# Patient Record
Sex: FEMALE | Race: BLACK OR AFRICAN AMERICAN | NOT HISPANIC OR LATINO | Employment: UNEMPLOYED | URBAN - METROPOLITAN AREA
[De-identification: names, ages, dates, MRNs, and addresses within clinical notes are randomized per-mention and may not be internally consistent; named-entity substitution may affect disease eponyms.]

---

## 2022-06-25 ENCOUNTER — HOSPITAL ENCOUNTER (EMERGENCY)
Facility: HOSPITAL | Age: 32
Discharge: HOME | End: 2022-06-25
Attending: EMERGENCY MEDICINE

## 2022-06-25 VITALS
TEMPERATURE: 98.3 F | OXYGEN SATURATION: 100 % | BODY MASS INDEX: 21.34 KG/M2 | HEART RATE: 83 BPM | RESPIRATION RATE: 18 BRPM | DIASTOLIC BLOOD PRESSURE: 82 MMHG | WEIGHT: 125 LBS | HEIGHT: 64 IN | SYSTOLIC BLOOD PRESSURE: 118 MMHG

## 2022-06-25 DIAGNOSIS — Z65.8 DOMESTIC PROBLEMS: Primary | ICD-10-CM

## 2022-06-25 PROCEDURE — 99282 EMERGENCY DEPT VISIT SF MDM: CPT | Performed by: EMERGENCY MEDICINE

## 2022-06-25 PROCEDURE — 99284 EMERGENCY DEPT VISIT MOD MDM: CPT

## 2022-06-25 NOTE — ED PROVIDER NOTES
History  Chief Complaint   Patient presents with    Depression     Patient arrives via EMS with reports of increased depression trigger being not getting along with parents  Patient denies SI/HI     Patient is a 49-year-old female seen in the emergency department with concern for domestic problems over the past several days  Patient states that she has been getting into arguments with her parents at home  Patient notes some feeling of depression due to these arguments  Patient denies homicidal and suicidal ideation  Patient declines to speak with crisis team, and request to be discharged home  Patient has no other acute medical complaints in the emergency department  None       History reviewed  No pertinent past medical history  History reviewed  No pertinent surgical history  History reviewed  No pertinent family history  I have reviewed and agree with the history as documented  E-Cigarette/Vaping    E-Cigarette Use Never User      E-Cigarette/Vaping Substances     Social History     Tobacco Use    Smoking status: Never Smoker    Smokeless tobacco: Never Used   Vaping Use    Vaping Use: Never used   Substance Use Topics    Alcohol use: Yes     Comment: occassionally    Drug use: Never       Review of Systems   Constitutional: Negative for chills and fever  HENT: Negative for ear pain and sore throat  Eyes: Negative for pain and visual disturbance  Respiratory: Negative for cough and shortness of breath  Cardiovascular: Negative for chest pain and palpitations  Gastrointestinal: Negative for abdominal pain and vomiting  Genitourinary: Negative for dysuria and hematuria  Musculoskeletal: Negative for arthralgias and back pain  Skin: Negative for color change and rash  Neurological: Negative for seizures and syncope  Psychiatric/Behavioral: Negative for suicidal ideas          Domestic problems, feelings of depression   All other systems reviewed and are negative  Physical Exam  Physical Exam  Vitals and nursing note reviewed  Constitutional:       General: She is not in acute distress  Appearance: She is well-developed  HENT:      Head: Normocephalic and atraumatic  Right Ear: External ear normal       Left Ear: External ear normal       Nose: Nose normal       Mouth/Throat:      Pharynx: Oropharynx is clear  Eyes:      General: No scleral icterus  Conjunctiva/sclera: Conjunctivae normal    Cardiovascular:      Rate and Rhythm: Normal rate and regular rhythm  Heart sounds: No murmur heard  Pulmonary:      Effort: Pulmonary effort is normal  No respiratory distress  Breath sounds: Normal breath sounds  Abdominal:      General: There is no distension  Palpations: Abdomen is soft  Tenderness: There is no abdominal tenderness  Musculoskeletal:         General: No deformity or signs of injury  Cervical back: Normal range of motion and neck supple  Skin:     General: Skin is warm and dry  Neurological:      General: No focal deficit present  Mental Status: She is alert  Cranial Nerves: No cranial nerve deficit  Sensory: No sensory deficit  Psychiatric:         Mood and Affect: Mood normal          Thought Content:  Thought content normal       Comments: no homicidal or suicidal ideation         Vital Signs  ED Triage Vitals [06/25/22 0132]   Temperature Pulse Respirations Blood Pressure SpO2   98 3 °F (36 8 °C) 83 18 118/82 100 %      Temp Source Heart Rate Source Patient Position - Orthostatic VS BP Location FiO2 (%)   Oral Monitor Sitting Left arm --      Pain Score       --           Vitals:    06/25/22 0132   BP: 118/82   Pulse: 83   Patient Position - Orthostatic VS: Sitting         Visual Acuity      ED Medications  Medications - No data to display    Diagnostic Studies  Results Reviewed     Procedure Component Value Units Date/Time    POCT pregnancy, urine [[id number]]     Lab Status: No result     Rapid drug screen, urine [[id number]]     Lab Status: No result Specimen: Urine                  No orders to display              Procedures  Procedures         ED Course                                             MDM  Number of Diagnoses or Management Options  Domestic problems  Diagnosis management comments: Patient is a 57-year-old female seen in the emergency department with concern for domestic problems  Patient denies homicidal and suicidal ideation, and appears to be at no imminent risk to herself or others  Patient declined to wait to speak with crisis team in the emergency department  Patient requested to be discharged home  Plan to have patient follow up with PCP/outpatient providers  Patient stable for discharge home  Discharge instructions were reviewed with patient  Amount and/or Complexity of Data Reviewed  Tests in the medicine section of CPT®: ordered        Disposition  Final diagnoses:   Domestic problems     Time reflects when diagnosis was documented in both MDM as applicable and the Disposition within this note     Time User Action Codes Description Comment    6/25/2022  1:40 AM Idalia Lozano Add [Z65 8] Domestic problems       ED Disposition     ED Disposition   Discharge    Condition   Stable    Date/Time   Sat Jun 25, 2022  1:51 AM    Comment   Dominique Dire discharge to home/self care  Follow-up Information     Follow up With Specialties Details Why Contact Info Additional Information    Your primary doctor  Call in 1 day       [place] Call  As needed [address] [id number]  [id number]  , [place], [place], [address]          Patient's Medications    No medications on file       No discharge procedures on file      PDMP Review     None          ED Provider  Electronically Signed by           Mehul White MD  06/25/22 [address] [place] [address]

## 2022-06-25 NOTE — ED NOTES
D/c instructions reviewed, pt verbalized understanding and has no further questions at this time  Pt ambulatory off unit with steady gait       Stephen Jose RN  06/25/22 6308

## 2022-07-25 ENCOUNTER — HOSPITAL ENCOUNTER (EMERGENCY)
Facility: HOSPITAL | Age: 32
End: 2022-07-28
Attending: EMERGENCY MEDICINE

## 2022-07-25 DIAGNOSIS — D21.9 FIBROIDS: ICD-10-CM

## 2022-07-25 DIAGNOSIS — D50.0 IRON DEFICIENCY ANEMIA DUE TO CHRONIC BLOOD LOSS: ICD-10-CM

## 2022-07-25 DIAGNOSIS — F29 PSYCHOSIS (HCC): Primary | ICD-10-CM

## 2022-07-25 DIAGNOSIS — Z00.8 MEDICAL CLEARANCE FOR PSYCHIATRIC ADMISSION: ICD-10-CM

## 2022-07-25 PROBLEM — D64.9 ANEMIA: Status: ACTIVE | Noted: 2022-07-25

## 2022-07-25 PROBLEM — R45.851 SUICIDAL IDEATIONS: Status: ACTIVE | Noted: 2022-07-25

## 2022-07-25 LAB
ABO GROUP BLD: NORMAL
ABO GROUP BLD: NORMAL
AMPHETAMINES SERPL QL SCN: NEGATIVE
BARBITURATES UR QL: NEGATIVE
BASOPHILS # BLD AUTO: 0.06 THOUSANDS/ΜL (ref 0–0.1)
BASOPHILS NFR BLD AUTO: 1 % (ref 0–1)
BENZODIAZ UR QL: NEGATIVE
BLD GP AB SCN SERPL QL: NEGATIVE
COCAINE UR QL: NEGATIVE
EOSINOPHIL # BLD AUTO: 0 THOUSAND/ΜL (ref 0–0.61)
EOSINOPHIL NFR BLD AUTO: 0 % (ref 0–6)
ERYTHROCYTE [DISTWIDTH] IN BLOOD BY AUTOMATED COUNT: 23.6 % (ref 11.6–15.1)
ETHANOL SERPL-MCNC: <10 MG/DL
EXT PREG TEST URINE: NEGATIVE
EXT. CONTROL ED NAV: NORMAL
FLUAV RNA RESP QL NAA+PROBE: NEGATIVE
FLUBV RNA RESP QL NAA+PROBE: NEGATIVE
HCT VFR BLD AUTO: 26 % (ref 34.8–46.1)
HGB BLD-MCNC: 6.8 G/DL (ref 11.5–15.4)
IMM GRANULOCYTES # BLD AUTO: 0.02 THOUSAND/UL (ref 0–0.2)
IMM GRANULOCYTES NFR BLD AUTO: 0 % (ref 0–2)
LYMPHOCYTES # BLD AUTO: 1.06 THOUSANDS/ΜL (ref 0.6–4.47)
LYMPHOCYTES NFR BLD AUTO: 19 % (ref 14–44)
MCH RBC QN AUTO: 16 PG (ref 26.8–34.3)
MCHC RBC AUTO-ENTMCNC: 26.2 G/DL (ref 31.4–37.4)
MCV RBC AUTO: 61 FL (ref 82–98)
METHADONE UR QL: NEGATIVE
MONOCYTES # BLD AUTO: 0.57 THOUSAND/ΜL (ref 0.17–1.22)
MONOCYTES NFR BLD AUTO: 10 % (ref 4–12)
NEUTROPHILS # BLD AUTO: 3.88 THOUSANDS/ΜL (ref 1.85–7.62)
NEUTS SEG NFR BLD AUTO: 70 % (ref 43–75)
NRBC BLD AUTO-RTO: 0 /100 WBCS
OPIATES UR QL SCN: NEGATIVE
OXYCODONE+OXYMORPHONE UR QL SCN: NEGATIVE
PCP UR QL: NEGATIVE
PLATELET # BLD AUTO: 524 THOUSANDS/UL (ref 149–390)
PMV BLD AUTO: 10.2 FL (ref 8.9–12.7)
RBC # BLD AUTO: 4.26 MILLION/UL (ref 3.81–5.12)
RH BLD: POSITIVE
RH BLD: POSITIVE
RSV RNA RESP QL NAA+PROBE: NEGATIVE
SARS-COV-2 RNA RESP QL NAA+PROBE: NEGATIVE
SPECIMEN EXPIRATION DATE: NORMAL
THC UR QL: NEGATIVE
WBC # BLD AUTO: 5.59 THOUSAND/UL (ref 4.31–10.16)

## 2022-07-25 PROCEDURE — 82077 ASSAY SPEC XCP UR&BREATH IA: CPT | Performed by: EMERGENCY MEDICINE

## 2022-07-25 PROCEDURE — 36430 TRANSFUSION BLD/BLD COMPNT: CPT

## 2022-07-25 PROCEDURE — 86901 BLOOD TYPING SEROLOGIC RH(D): CPT | Performed by: EMERGENCY MEDICINE

## 2022-07-25 PROCEDURE — 83540 ASSAY OF IRON: CPT

## 2022-07-25 PROCEDURE — 86850 RBC ANTIBODY SCREEN: CPT | Performed by: EMERGENCY MEDICINE

## 2022-07-25 PROCEDURE — 36415 COLL VENOUS BLD VENIPUNCTURE: CPT | Performed by: EMERGENCY MEDICINE

## 2022-07-25 PROCEDURE — 0241U HB NFCT DS VIR RESP RNA 4 TRGT: CPT | Performed by: EMERGENCY MEDICINE

## 2022-07-25 PROCEDURE — 99242 OFF/OP CONSLTJ NEW/EST SF 20: CPT

## 2022-07-25 PROCEDURE — 83550 IRON BINDING TEST: CPT

## 2022-07-25 PROCEDURE — 99285 EMERGENCY DEPT VISIT HI MDM: CPT

## 2022-07-25 PROCEDURE — 82728 ASSAY OF FERRITIN: CPT

## 2022-07-25 PROCEDURE — 99285 EMERGENCY DEPT VISIT HI MDM: CPT | Performed by: EMERGENCY MEDICINE

## 2022-07-25 PROCEDURE — 86920 COMPATIBILITY TEST SPIN: CPT

## 2022-07-25 PROCEDURE — 80307 DRUG TEST PRSMV CHEM ANLYZR: CPT | Performed by: EMERGENCY MEDICINE

## 2022-07-25 PROCEDURE — 85025 COMPLETE CBC W/AUTO DIFF WBC: CPT | Performed by: EMERGENCY MEDICINE

## 2022-07-25 PROCEDURE — 86900 BLOOD TYPING SEROLOGIC ABO: CPT | Performed by: EMERGENCY MEDICINE

## 2022-07-25 PROCEDURE — 81025 URINE PREGNANCY TEST: CPT | Performed by: EMERGENCY MEDICINE

## 2022-07-25 RX ORDER — LORAZEPAM 1 MG/1
1 TABLET ORAL ONCE
Status: COMPLETED | OUTPATIENT
Start: 2022-07-25 | End: 2022-07-25

## 2022-07-25 RX ORDER — ACETAMINOPHEN 325 MG/1
650 TABLET ORAL EVERY 6 HOURS PRN
Status: DISCONTINUED | OUTPATIENT
Start: 2022-07-25 | End: 2022-07-28 | Stop reason: HOSPADM

## 2022-07-25 RX ORDER — POLYETHYLENE GLYCOL 3350 17 G/17G
17 POWDER, FOR SOLUTION ORAL DAILY PRN
Status: DISCONTINUED | OUTPATIENT
Start: 2022-07-25 | End: 2022-07-28 | Stop reason: HOSPADM

## 2022-07-25 RX ORDER — DOCUSATE SODIUM 100 MG/1
100 CAPSULE, LIQUID FILLED ORAL 2 TIMES DAILY
Status: DISCONTINUED | OUTPATIENT
Start: 2022-07-25 | End: 2022-07-28 | Stop reason: HOSPADM

## 2022-07-25 RX ORDER — FERROUS SULFATE 325(65) MG
325 TABLET ORAL
Status: DISCONTINUED | OUTPATIENT
Start: 2022-07-25 | End: 2022-07-28 | Stop reason: HOSPADM

## 2022-07-25 RX ORDER — SENNOSIDES 8.6 MG
2 TABLET ORAL
Status: DISCONTINUED | OUTPATIENT
Start: 2022-07-25 | End: 2022-07-28 | Stop reason: HOSPADM

## 2022-07-25 RX ORDER — LANOLIN ALCOHOL/MO/W.PET/CERES
3 CREAM (GRAM) TOPICAL
Status: DISCONTINUED | OUTPATIENT
Start: 2022-07-25 | End: 2022-07-28 | Stop reason: HOSPADM

## 2022-07-25 RX ADMIN — FERROUS SULFATE TAB 325 MG (65 MG ELEMENTAL FE) 325 MG: 325 (65 FE) TAB at 15:04

## 2022-07-25 RX ADMIN — Medication 3 MG: at 23:15

## 2022-07-25 RX ADMIN — SENNOSIDES 17.2 MG: 8.6 TABLET, FILM COATED ORAL at 23:15

## 2022-07-25 RX ADMIN — DOCUSATE SODIUM 100 MG: 100 CAPSULE, LIQUID FILLED ORAL at 23:15

## 2022-07-25 RX ADMIN — LORAZEPAM 1 MG: 1 TABLET ORAL at 13:24

## 2022-07-25 NOTE — ED NOTES
Pt came with only a cell phone, wearing a dress with no undergarments and flip flops        Verba Pace  07/25/22 2951

## 2022-07-25 NOTE — ED NOTES
MA Lib[facility] Bed Search    [facility]: Per Sheeba, at capacity for MA libby beds  [facility]:  No beds per Kathya Ly:  No beds per Ed  [facility]:  Per Marichuy Cruz, they don't take MA apps  [facility]:  No answer  [facility]:  No out of county [employer]:  Fax for review for [employer] bed per Leland Yuen

## 2022-07-25 NOTE — ED NOTES
Met with patient and completed the crisis assessments after consult was ordered  Patient is a 27year-old female brought in by EMS due to psychotic break and suicidal ideations  Upon review of records there was no documented mental health history other than an ED visit on 6/25/22 for a episode of depression and domestic issues which patient declined any treatment and was discharged  Upon assessment patient was groomed, alert, oriented to person, place but not month or year  Patient spoke clearly but not logically  Her eye contact and concentration was poor  Her affect was constricted and patient intermittently grimaced and stared straight ahead through out the assessment  Patient was paranoid, fearful, and delusional  Patient stated she called 911 and is seeking help  She believes her parents are evil and trying to murder her  She is unsure why  Patient stated she hears evil demons (non command) and sees evil things such as a "busted pimple"  She reported feeling hopeless due to fear of being murdered and unable to secure help  Patient endorsed episodic suicidal ideations over the past 1 month  She denied any plan though stated that she wished the PO Ativan given to her in the ED would have poisoned her  Patient reported she attempted to overdose on over the counter sleeping pills "1 or 2 times " about 1 month ago  Patient stated she did not seek any treatment  Patient denied any: prior mental health history; HI; drug or ETOH use; access to firearms; legal issues  Patient stated that she lives with her parents, but does not feel safe with them  She denied any history of abuse  Patient stated she came back from [place] [address] a couple of months ago but was unsure why she went  Patient told [facility] that she was hospitalized there for uterine fibroids  Patient denied any current somatic complaints  Patient's thinking was disordered and not reality based   Patient was intermittently uncooperative with staff when trying obtain necessary blood draw for medical clearance  Patient insight and judgement are impaired  She does not appear to be able to care for herself due to her psychosis  Lab results are pending  There is criteria for 302  Patient signed a 201 after being explained voluntary/201 vs 302 and her rights with each commitment  Patient voiced "yes" that she understood her voluntary rights  EMIL Irizarry signed [address]  Note: Two doctor [id number] will be completed if patient rescinds 201 due endorsing suicide and current psychosis affecting her ability to care for self

## 2022-07-25 NOTE — ED NOTES
Per [facility], patient is not on file  Patient does not appear to have any insurance  201 sent to Intake, though no beds are available at present  Patient will need MA jayant bed search

## 2022-07-25 NOTE — ED PROVIDER NOTES
History  Chief Complaint   Patient presents with    Hallucinations     Arrived via [facility] with reports of suicidal ideations  pt admits to feeling depressed and suicidal  Denies current plan for suicide, but has daily thoughts of suicide over the last month  Admits to prior Suicide attempt approx 1 month ago when she took a "handful" of sleeping pills  States she did not seek help at that time  No mental health history  admits to hearing voices states "they just talk" pt denies that voices tell her to harm herself or others     The patient tells me that she feels depressed for at least 1 month  She also tells me that she is having unusual thoughts, for instance she believes that her parents are out to get her and her usual   She is hearing voices that she presumed with the voices of demons  She tells me is result of this she no longer wants to live anymore  She did have a suicide attempt 1 month ago where she tried overdose on sleeping pills  She tells me she does not have any suicide plan right now but continues to feel like she does not want to live and would rather be dead  She reports hospitalization a few months ago when she was an ingrown for uterine fibroids at which time she required a blood transfusion  She tells me she is not currently on her period and does not feel any signs of anemia currently including shortness of breath, fatigue  No prior episodes of hallucination  She denies taking any psychiatric medications  Symptoms are severe and constant  None       Past Medical History:   Diagnosis Date    Fibroids 7/25/2022    Iron deficiency anemia due to chronic blood loss 7/25/2022       History reviewed  No pertinent surgical history  History reviewed  No pertinent family history  I have reviewed and agree with the history as documented      E-Cigarette/Vaping    E-Cigarette Use Never User      E-Cigarette/Vaping Substances     Social History     Tobacco Use    Smoking status: Never Smoker    Smokeless tobacco: Never Used   Vaping Use    Vaping Use: Never used   Substance Use Topics    Alcohol use: Yes     Comment: occassionally    Drug use: Never       Review of Systems   All other systems reviewed and are negative  Physical Exam  Physical Exam  Vitals and nursing note reviewed  Constitutional:       General: She is not in acute distress  Appearance: She is well-developed  HENT:      Head: Normocephalic and atraumatic  Eyes:      Comments: Pale conjunctiva   Cardiovascular:      Rate and Rhythm: Normal rate and regular rhythm  Heart sounds: No murmur heard  Pulmonary:      Effort: Pulmonary effort is normal  No respiratory distress  Breath sounds: Normal breath sounds  Abdominal:      Palpations: Abdomen is soft  Tenderness: There is no abdominal tenderness  Genitourinary:     General: Normal vulva  Musculoskeletal:      Cervical back: Neck supple  Skin:     General: Skin is warm and dry  Neurological:      General: No focal deficit present  Mental Status: She is alert and oriented to person, place, and time  Psychiatric:      Comments: Patient is appears to be responding to internal stimuli when I enter room  Poor eye contact  Poor judgment  Poor insight           Vital Signs  ED Triage Vitals   Temperature Pulse Respirations Blood Pressure SpO2   07/25/22 1258 07/25/22 1258 07/25/22 1258 07/25/22 1258 07/25/22 1258   98 4 °F (36 9 °C) 72 16 106/74 100 %      Temp Source Heart Rate Source Patient Position - Orthostatic VS BP Location FiO2 (%)   07/25/22 1258 07/25/22 1258 07/25/22 1258 07/25/22 1258 --   Oral Monitor Lying Left arm       Pain Score       07/25/22 1228       No Pain           Vitals:    07/25/22 1258   BP: 106/74   Pulse: 72   Patient Position - Orthostatic VS: Lying         Visual Acuity      ED Medications  Medications   ferrous sulfate tablet 325 mg (325 mg Oral Given 7/25/22 1504)   LORazepam (ATIVAN) tablet 1 mg (1 mg Oral Given 7/25/22 1324)       Diagnostic Studies  Results Reviewed     Procedure Component Value Units Date/Time    Rapid drug screen, urine [[id number]] Collected: 07/25/22 2044    Lab Status: In process Specimen: Urine, Clean Catch Updated: 07/25/22 2049    CBC and differential [[id number]]  (Abnormal) Collected: 07/25/22 1422    Lab Status: Final result Specimen: Blood from Arm, Right Updated: 07/25/22 1450     WBC 5 59 Thousand/uL      RBC 4 26 Million/uL      Hemoglobin 6 8 g/dL      Hematocrit 26 0 %      MCV 61 fL      MCH 16 0 pg      MCHC 26 2 g/dL      RDW 23 6 %      MPV 10 2 fL      Platelets 460 Thousands/uL      nRBC 0 /100 WBCs      Neutrophils Relative 70 %      Immat GRANS % 0 %      Lymphocytes Relative 19 %      Monocytes Relative 10 %      Eosinophils Relative 0 %      Basophils Relative 1 %      Neutrophils Absolute 3 88 Thousands/µL      Immature Grans Absolute 0 02 Thousand/uL      Lymphocytes Absolute 1 06 Thousands/µL      Monocytes Absolute 0 57 Thousand/µL      Eosinophils Absolute 0 00 Thousand/µL      Basophils Absolute 0 06 Thousands/µL     Ethanol [[id number]]  (Normal) Collected: 07/25/22 1422    Lab Status: Final result Specimen: Blood from Arm, Right Updated: 07/25/22 1443     Ethanol Lvl <10 mg/dL     FLU/RSV/COVID - if FLU/RSV clinically relevant [[id number]]  (Normal) Collected: 07/25/22 1309    Lab Status: Final result Specimen: Nares from Nose Updated: 07/25/22 1412     SARS-CoV-2 Negative     INFLUENZA A PCR Negative     INFLUENZA B PCR Negative     RSV PCR Negative    Narrative:      FOR PEDIATRIC PATIENTS - copy/paste COVID Guidelines URL to browser: [email or web address]    SARS-CoV-2 assay is a Nucleic Acid Amplification assay intended for the  qualitative detection of nucleic acid from SARS-CoV-2 in nasopharyngeal  swabs  Results are for the presumptive identification of SARS-CoV-2 RNA      Positive results are indicative of infection with SARS-CoV-2, the virus  causing COVID-19, but do not rule out bacterial infection or co-infection  with other viruses  Laboratories within the [place] and its  territories are required to report all positive results to the appropriate  public health authorities  Negative results do not preclude SARS-CoV-2  infection and should not be used as the sole basis for treatment or other  patient management decisions  Negative results must be combined with  clinical observations, patient history, and epidemiological information  This test has not been FDA cleared or approved  This test has been authorized by FDA under an Emergency Use Authorization  (EUA)  This test is only authorized for the duration of time the  declaration that circumstances exist justifying the authorization of the  emergency use of an in vitro diagnostic tests for detection of SARS-CoV-2  virus and/or diagnosis of COVID-19 infection under section 564(b)(1) of  the Act, 21 U  S C  806DLU-9(U)(0), unless the authorization is terminated  or revoked sooner  The test has been validated but independent review by FDA  and CLIA is pending  Test performed using [employer] GeneXpert: This RT-PCR assay targets N2,  a region unique to SARS-CoV-2  A conserved region in the E-gene was chosen  for pan-Sarbecovirus detection which includes SARS-CoV-2  POCT pregnancy, urine [[id number]]     Lab Status: No result Specimen: Urine                  No orders to display              Procedures  Procedures         ED Course  ED Course as of 07/25/22 2057 Mon Jul 25, 2022   1454 Anemia noted but is chronic and asymptomatic  She is not even tachycardic  Will treat with oral iron  Patient was diagnosed with uterine fibroids as the cause of her anemia  She is currently not menstruating  1455 Hemoglobin(!!): 6 8   1455 Patient is medically cleared for psychiatric evaluation and/or admission     2057 Case signed out to Dr Nitin Alberto pending psychiatric placement and urine results  MDM  Number of Diagnoses or Management Options  Diagnosis management comments: Evaluate patient  She has no symptomatic anemia due to recent hospitalization will order CBC in addition to routine behavioral health testing  Amount and/or Complexity of Data Reviewed  Clinical lab tests: ordered and reviewed        Disposition  Final diagnoses:   Psychosis ([address] )     Time reflects when diagnosis was documented in both MDM as applicable and the Disposition within this note     Time User Action Codes Description Comment    7/25/2022  2:56 PM Adrian Clark Add [F29] Psychosis [facility])       ED Disposition     ED Disposition   Transfer to Behavioral Health    [facility]   --    Date/Time   Mon Jul 25, 2022  2:58 PM    Comment   Manuela Olivier should be transferred out to [facility] facility and has been medically cleared  MD Documentation    [address] Most Recent Value   Patient Condition The patient has been stabilized such that within reasonable medical probability, no material deterioration of the patient condition or the condition of the unborn child(winston) is likely to result from the transfer   Reason for Transfer Level of Care needed not available at this facility  [inpatient psyc]   Risks of Transfer Potential for delay in receiving treatment, Potential deterioration of medical condition   Sending MD Dr [facility]   Provider Certification Consent was not obtained as patient is committed to psychiatric facility and transfer is mandated      Follow-up Information    None         Patient's Medications    No medications on file       No discharge procedures on file      PDMP Review     None          ED Provider  Electronically Signed by           Stephenie Ames MD  07/25/22 4185

## 2022-07-25 NOTE — LETTER
[facility] EMERGENCY DEPARTMENT  [address] [facility] [address]  Dept: [phone]      LJVZBB TRANSFER CONSENT    NAME Anali Pratt                                         1990                              MRN [id number]    I have been informed of my rights regarding examination, treatment, and transfer   by Dr Julien Steve MD    Benefits: Specialized equipment and/or services available at the receiving facility (Include comment)________________________ [address])    Risks: Potential for delay in receiving treatment      Transfer Request   I acknowledge that my medical condition has been evaluated and explained to me by the emergency department physician or other qualified medical person and/or my attending physician who has recommended and offered to me further medical examination and treatment  I understand the Hospital's obligation with respect to the treatment and stabilization of my emergency medical condition  I nevertheless request to be transferred  I release the Hospital, the doctor, and any other persons caring for me from all responsibility or liability for any injury or ill effects that may result from my transfer and agree to accept all responsibility for the consequences of my choice to transfer, rather than receive stabilizing treatment at the Hospital  I understand that because the transfer is my request, my insurance may not provide reimbursement for the services  The Hospital will assist and direct me and my family in how to make arrangements for transfer, but the hospital is not liable for any fees charged by the transport service  In spite of this understanding, I refuse to consent to further medical examination and treatment which has been offered to me, and request transfer to  [address] Name, [facility] : Boris RAMEY   I authorize the performance of emergency medical procedures and treatments upon me in both transit and upon arrival at the receiving facility  Additionally, I authorize the release of any and all medical records to the receiving facility and request they be transported with me, if possible  I authorize the performance of emergency medical procedures and treatments upon me in both transit and upon arrival at the receiving facility  Additionally, I authorize the release of any and all medical records to the receiving facility and request they be transported with me, if possible  I understand that the safest mode of transportation during a medical emergency is an ambulance and that the Hospital advocates the use of this mode of transport  Risks of traveling to the receiving facility by car, including absence of medical control, life sustaining equipment, such as oxygen, and medical personnel has been explained to me and I fully understand them  (SALVADOR CORRECT BOX BELOW)  [  ]  I consent to the stated transfer and to be transported by ambulance/helicopter  [  ]  I consent to the stated transfer, but refuse transportation by ambulance and accept full responsibility for my transportation by car  I understand the risks of non-ambulance transfers and I exonerate the Hospital and its staff from any deterioration in my condition that results from this refusal     X___________________________________________    DATE  22  TIME________  Signature of patient or legally responsible individual signing on patient behalf           RELATIONSHIP TO PATIENT_________________________          Provider Certification    NAME Analisa Thompson                                        [facility] 1990                              MRN [id number]    A medical screening exam was performed on the above named patient  Based on the examination:    Condition Necessitating Transfer The primary encounter diagnosis was Psychosis ([facility] [address] )   Diagnoses of Iron deficiency anemia due to chronic blood loss, Medical clearance for psychiatric admission, and Fibroids were also pertinent to this visit  Patient Condition: The patient has been stabilized such that within reasonable medical probability, no material deterioration of the patient condition or the condition of the unborn child(winston) is likely to result from the transfer    Reason for Transfer: Level of Care needed not available at this facility (Inpatient psychiatric)    Transfer Requirements: [facility]   · Space available and qualified personnel available for treatment as acknowledged by Cosmo Khoury [phone]  · Agreed to accept transfer and to provide appropriate medical treatment as acknowledged by       Dr Alfreda Severance  · Appropriate medical records of the examination and treatment of the patient are provided at the time of transfer   [address] [facility], [address] _______  · Transfer will be performed by qualified personnel from [address] [place] [address]  and appropriate transfer equipment as required, including the use of necessary and appropriate life support measures  Provider Certification: I have examined the patient and explained the following risks and benefits of being transferred/refusing transfer to the patient/family:  General risk, such as traffic hazards, adverse weather conditions, rough terrain or turbulence, possible failure of equipment (including vehicle or aircraft), or consequences of actions of persons outside the control of the transport personnel      Based on these reasonable risks and benefits to the patient and/or the unborn child(winston), and based upon the information available at the time of the patients examination, I certify that the medical benefits reasonably to be expected from the provision of appropriate medical treatments at another medical facility outweigh the increasing risks, if any, to the individuals medical condition, and in the case of labor to the unborn child, from effecting the transfer      X____________________________________________ DATE 07/27/22 TIME_______      ORIGINAL - SEND TO MEDICAL RECORDS   COPY - SEND WITH PATIENT DURING TRANSFER

## 2022-07-25 NOTE — ED NOTES
Mother and farther here to see pt at this time pt asked if she wanted to see them and she said no they did drop off something's for her at this time (food and wipes)      Jazmin Crawley RN  07/25/22 0976

## 2022-07-25 NOTE — EMTALA/ACUTE CARE TRANSFER
[facility] EMERGENCY DEPARTMENT  [address] [facility] [address]  Dept: [phone]      VZTTOO TRANSFER CONSENT    NAME Robb LOU 1990                              MRN [id number]    I have been informed of my rights regarding examination, treatment, and transfer   by Dr Merly Coronel MD    Benefits:      Risks:        Consent for Transfer:  I acknowledge that my medical condition has been evaluated and explained to me by the emergency department physician or other qualified medical person and/or my attending physician, who has recommended that I be transferred to the service of    at    The above potential benefits of such transfer, the potential risks associated with such transfer, and the probable risks of not being transferred have been explained to me, and I fully understand them  The doctor has explained that, in my case, the benefits of transfer outweigh the risks  I agree to be transferred  I authorize the performance of emergency medical procedures and treatments upon me in both transit and upon arrival at the receiving facility  Additionally, I authorize the release of any and all medical records to the receiving facility and request they be transported with me, if possible  I understand that the safest mode of transportation during a medical emergency is an ambulance and that the Hospital advocates the use of this mode of transport  Risks of traveling to the receiving facility by car, including absence of medical control, life sustaining equipment, such as oxygen, and medical personnel has been explained to me and I fully understand them  (SALVADOR CORRECT BOX BELOW)  [  ]  I consent to the stated transfer and to be transported by ambulance/helicopter  [  ]  I consent to the stated transfer, but refuse transportation by ambulance and accept full responsibility for my transportation by car    I understand the risks of non-ambulance transfers and I exonerate the Hospital and its staff from any deterioration in my condition that results from this refusal     X___________________________________________    DATE  22  TIME________  Signature of patient or legally responsible individual signing on patient behalf           RELATIONSHIP TO PATIENT_________________________          Provider Certification    NAME Ricky Navarro                                         1990                              MRN [id number]    A medical screening exam was performed on the above named patient  Based on the examination:    Condition Necessitating Transfer The encounter diagnosis was Psychosis ([facility] [address] )  Patient Condition:      Reason for Transfer:      Transfer Requirements: Facility     · Space available and qualified personnel available for treatment as acknowledged by    · Agreed to accept transfer and to provide appropriate medical treatment as acknowledged by          · Appropriate medical records of the examination and treatment of the patient are provided at the time of transfer   [address] [facility], [address] _______  · Transfer will be performed by qualified personnel from    and appropriate transfer equipment as required, including the use of necessary and appropriate life support measures      Provider Certification: I have examined the patient and explained the following risks and benefits of being transferred/refusing transfer to the patient/family:         Based on these reasonable risks and benefits to the patient and/or the unborn child(winston), and based upon the information available at the time of the patients examination, I certify that the medical benefits reasonably to be expected from the provision of appropriate medical treatments at another medical facility outweigh the increasing risks, if any, to the individuals medical condition, and in the case of labor to the unborn child, from effecting the transfer      X____________________________________________ DATE 07/25/22        TIME_______      ORIGINAL - SEND TO MEDICAL RECORDS   COPY - SEND WITH PATIENT DURING TRANSFER

## 2022-07-25 NOTE — ED NOTES
Pt is refusing to allow blood draw  States that she does not want blood drawn to prove that she is not a drunk  MD notified and in to speak with pt        Jose Eduardo Min  07/25/22 9024

## 2022-07-25 NOTE — EMTALA/ACUTE CARE TRANSFER
[facility] EMERGENCY DEPARTMENT  [address] [facility] [address]  Dept: [phone]      EMTALA TRANSFER CONSENT    NAME Jake Jhaveri                                         1990                              MRN [id number]    I have been informed of my rights regarding examination, treatment, and transfer   by Dr Eduardo Whitney MD    Benefits:      Risks: Potential for delay in receiving treatment, Potential deterioration of medical condition      Consent for Transfer:  I acknowledge that my medical condition has been evaluated and explained to me by the emergency department physician or other qualified medical person and/or my attending physician, who has recommended that I be transferred to the service of    at    The above potential benefits of such transfer, the potential risks associated with such transfer, and the probable risks of not being transferred have been explained to me, and I fully understand them  The doctor has explained that, in my case, the benefits of transfer outweigh the risks  I agree to be transferred  I authorize the performance of emergency medical procedures and treatments upon me in both transit and upon arrival at the receiving facility  Additionally, I authorize the release of any and all medical records to the receiving facility and request they be transported with me, if possible  I understand that the safest mode of transportation during a medical emergency is an ambulance and that the Hospital advocates the use of this mode of transport  Risks of traveling to the receiving facility by car, including absence of medical control, life sustaining equipment, such as oxygen, and medical personnel has been explained to me and I fully understand them  (SALVADOR CORRECT BOX BELOW)  [  ]  I consent to the stated transfer and to be transported by ambulance/helicopter    [  ]  I consent to the stated transfer, but refuse transportation by ambulance and accept full responsibility for my transportation by car  I understand the risks of non-ambulance transfers and I exonerate the Hospital and its staff from any deterioration in my condition that results from this refusal     X___________________________________________    DATE  22  TIME________  Signature of patient or legally responsible individual signing on patient behalf           RELATIONSHIP TO PATIENT_________________________          Provider Certification    NAME Clementina Schlatter                                         1990                              MRN [id number]    A medical screening exam was performed on the above named patient  Based on the examination:    Condition Necessitating Transfer The encounter diagnosis was Psychosis ([address] )  Patient Condition: The patient has been stabilized such that within reasonable medical probability, no material deterioration of the patient condition or the condition of the unborn child(winston) is likely to result from the transfer    Reason for Transfer: Level of Care needed not available at this facility (inpatient psyc)    Transfer Requirements: Facility     · Space available and qualified personnel available for treatment as acknowledged by    · Agreed to accept transfer and to provide appropriate medical treatment as acknowledged by          · Appropriate medical records of the examination and treatment of the patient are provided at the time of transfer   [address], [address] _______  · Transfer will be performed by qualified personnel from    and appropriate transfer equipment as required, including the use of necessary and appropriate life support measures      Provider Certification: I have examined the patient and explained the following risks and benefits of being transferred/refusing transfer to the patient/family:  Consent was not obtained as patient is committed to psychiatric facility and transfer is mandated      Based on these reasonable risks and benefits to the patient and/or the unborn child(winston), and based upon the information available at the time of the patients examination, I certify that the medical benefits reasonably to be expected from the provision of appropriate medical treatments at another medical facility outweigh the increasing risks, if any, to the individuals medical condition, and in the case of labor to the unborn child, from effecting the transfer      X____________________________________________ DATE 07/25/22        TIME_______      ORIGINAL - SEND TO MEDICAL RECORDS   COPY - SEND WITH PATIENT DURING TRANSFER

## 2022-07-25 NOTE — Clinical Note
Irene Brown should be transferred out to [facility] ps facility and has been medically cleared

## 2022-07-26 LAB
ABO GROUP BLD BPU: NORMAL
BPU ID: NORMAL
CROSSMATCH: NORMAL
FERRITIN SERPL-MCNC: 3 NG/ML (ref 8–388)
HCT VFR BLD AUTO: 26 % (ref 34.8–46.1)
HCT VFR BLD AUTO: 26.5 % (ref 34.8–46.1)
HGB BLD-MCNC: 7.2 G/DL (ref 11.5–15.4)
HGB BLD-MCNC: 7.3 G/DL (ref 11.5–15.4)
IRON SATN MFR SERPL: 3 % (ref 15–50)
IRON SERPL-MCNC: 16 UG/DL (ref 50–170)
TIBC SERPL-MCNC: 467 UG/DL (ref 250–450)
UNIT DISPENSE STATUS: NORMAL
UNIT PRODUCT CODE: NORMAL
UNIT PRODUCT VOLUME: 350 ML
UNIT RH: NORMAL

## 2022-07-26 PROCEDURE — P9016 RBC LEUKOCYTES REDUCED: HCPCS

## 2022-07-26 PROCEDURE — 99242 OFF/OP CONSLTJ NEW/EST SF 20: CPT | Performed by: PSYCHIATRY & NEUROLOGY

## 2022-07-26 PROCEDURE — 99213 OFFICE O/P EST LOW 20 MIN: CPT | Performed by: STUDENT IN AN ORGANIZED HEALTH CARE EDUCATION/TRAINING PROGRAM

## 2022-07-26 PROCEDURE — 85014 HEMATOCRIT: CPT | Performed by: EMERGENCY MEDICINE

## 2022-07-26 PROCEDURE — 85018 HEMOGLOBIN: CPT | Performed by: EMERGENCY MEDICINE

## 2022-07-26 PROCEDURE — 96375 TX/PRO/DX INJ NEW DRUG ADDON: CPT

## 2022-07-26 PROCEDURE — 36415 COLL VENOUS BLD VENIPUNCTURE: CPT

## 2022-07-26 PROCEDURE — 85018 HEMOGLOBIN: CPT

## 2022-07-26 PROCEDURE — 85014 HEMATOCRIT: CPT

## 2022-07-26 RX ORDER — ONDANSETRON 2 MG/ML
4 INJECTION INTRAMUSCULAR; INTRAVENOUS ONCE
Status: COMPLETED | OUTPATIENT
Start: 2022-07-26 | End: 2022-07-26

## 2022-07-26 RX ADMIN — DOCUSATE SODIUM 100 MG: 100 CAPSULE, LIQUID FILLED ORAL at 08:06

## 2022-07-26 RX ADMIN — FERROUS SULFATE TAB 325 MG (65 MG ELEMENTAL FE) 325 MG: 325 (65 FE) TAB at 08:06

## 2022-07-26 RX ADMIN — Medication 3 MG: at 21:50

## 2022-07-26 RX ADMIN — ONDANSETRON 4 MG: 2 INJECTION INTRAMUSCULAR; INTRAVENOUS at 21:47

## 2022-07-26 RX ADMIN — SENNOSIDES 17.2 MG: 8.6 TABLET, FILM COATED ORAL at 21:50

## 2022-07-26 RX ADMIN — DOCUSATE SODIUM 100 MG: 100 CAPSULE, LIQUID FILLED ORAL at 21:50

## 2022-07-26 NOTE — ASSESSMENT & PLAN NOTE
· Patient reports being diagnosed approximately 1 year ago via MRI in [place]  · Required blood transfusion during hospitalization at that time  · Has not followed up with diagnosis  · Will need to see Gyn outpatient

## 2022-07-26 NOTE — ED NOTES
Pt eating breakfast at this time; 1:1 sitter continues; pt with no further needs; will continue to monitor     Leanna Huggins RN  07/26/22 5254

## 2022-07-26 NOTE — ED NOTES
sourav called and aware of [facility] referral   Teams set up  on ipad ed Brian Samaniego 35, RN  07/26/22 3292

## 2022-07-26 NOTE — ASSESSMENT & PLAN NOTE
· Found to have hgb 6 8, microcytic   · Patient denies recent/active bleed  · Reports taking iron supplementation as a child but denies any knowledge of anemia diagnosis  · Has known fibroids  · Last menstrual period was "normal" approximately 2 weeks ago  · 1 u PRBCs ordered and given in ED  · Hemoglobin improved to 7 2, repeat hemoglobin 7 3  · Iron panel significant for iron deficiency anemia with iron level as low as 16  · Continue with p o   Ferrous sulfate  · Bowel regimen to avoid constipation  · Offered IV Venofer as this would most likely increase her iron stores quicker but patient refused

## 2022-07-26 NOTE — ASSESSMENT & PLAN NOTE
· Presented to ED with SI, hallucinations, depression  · 201 signed in ED  · Pending inpatient psych bed

## 2022-07-26 NOTE — ED NOTES
Intake verified having the 201 but no network beds currently  Medicaid application bed search efforts:     [place]: per Katherine Urbano, no beds today  [place]:  per Marygrace Boast, no beds available  [place]:  per Angella Mancia, no beds  [facility]:  per Lm Tran, no beds  [place]: per Davon Austin, no beds  [facility]: per Ana Santizo, no beds  [place]: per Yojana Martini, no beds    Does not accept Medicaid applications:  [facility]    Psychiatry consult ordered  Alerted nursing of the same; she will contact [address] [place] [address], when able

## 2022-07-26 NOTE — PROGRESS NOTES
Narcisa U  64   Progress Note - Leopoldoannamarie Lucio 1990, 27 y o  female MRN: [id number]  Unit/Bed#: [facility] 02 Encounter: [id number]  Primary Care Provider: No primary care provider on file  Date and time admitted to hospital: 7/25/2022 12:22 PM    Anemia  Assessment & Plan  · Found to have hgb 6 8, microcytic   · Patient denies recent/active bleed  · Reports taking iron supplementation as a child but denies any knowledge of anemia diagnosis  · Has known fibroids  · Last menstrual period was "normal" approximately 2 weeks ago  · 1 u PRBCs ordered and given in ED  · Hemoglobin improved to 7 2, repeat hemoglobin 7 3  · Iron panel significant for iron deficiency anemia with iron level as low as 16  · Continue with p o  Ferrous sulfate  · Bowel regimen to avoid constipation  · Offered IV Venofer as this would most likely increase her iron stores quicker but patient refused    Fibroids  Assessment & Plan  · Patient reports being diagnosed approximately 1 year ago via MRI in [place]  · Required blood transfusion during hospitalization at that time  · Has not followed up with diagnosis  · Will need to see Gyn outpatient    * Suicidal ideations  Assessment & Plan  · Presented to ED with SI, hallucinations, depression  · 201 signed in ED  · Pending inpatient psych bed          VTE Pharmacologic Prophylaxis:   Low Risk (Score 0-2) - Encourage Ambulation  Patient Centered Rounds: I performed bedside rounds with nursing staff today  Discussions with Specialists or Other Care Team Provider: n/a    Education and Discussions with Family / Patient: Patient declined call to   Time Spent for Care: 30 minutes  More than 50% of total time spent on counseling and coordination of care as described above      Current Length of Stay: 0 day(s)  Current Patient Status: Emergency   Certification Statement: The patient will continue to require additional inpatient hospital stay due to pending transfer to psych facility  Discharge Plan: Once bed is available    Code Status: No Order    Subjective:   Patient seen examined at bedside  Currently denies having any acute complaints  Denies any chest pain shortness of breath palpitations abdominal pain nausea vomiting diarrhea  She does admit to having heavy periods since the pandemic started which could be contributing to her anemia  Objective:     Vitals:   Temp (24hrs), Av 8 °F (37 1 °C), Min:98 6 °F (37 °C), Max:98 9 °F (37 2 °C)    Temp:  [98 6 °F (37 °C)-98 9 °F (37 2 °C)] 98 8 °F (37 1 °C)  HR:  [70-83] 83  Resp:  [16-17] 17  BP: ()/(62-84) 116/84  SpO2:  [98 %-100 %] 98 %  Body mass index is 21 46 kg/m²  Input and Output Summary (last 24 hours): Intake/Output Summary (Last 24 hours) at 2022 1808  Last data filed at 2022 0340  Gross per 24 hour   Intake 350 ml   Output --   Net 350 ml       Physical Exam:   Physical Exam  Vitals reviewed  HENT:      Head: Normocephalic and atraumatic  Right Ear: External ear normal       Left Ear: External ear normal       Nose: Nose normal       Mouth/Throat:      Mouth: Mucous membranes are moist       Pharynx: Oropharynx is clear  Eyes:      Extraocular Movements: Extraocular movements intact  Cardiovascular:      Rate and Rhythm: Normal rate and regular rhythm  Heart sounds: Murmur heard  Pulmonary:      Effort: Pulmonary effort is normal       Breath sounds: Normal breath sounds  Abdominal:      General: Abdomen is flat  Palpations: Abdomen is soft  Tenderness: There is no abdominal tenderness  Musculoskeletal:      Right lower leg: No edema  Left lower leg: No edema  Skin:     General: Skin is warm and dry  Neurological:      General: No focal deficit present  Mental Status: She is alert  Mental status is at baseline     Psychiatric:         Mood and Affect: Mood normal          Behavior: Behavior normal           Additional Data: Labs:  Results from last 7 days   Lab Units 07/26/22  1457 07/26/22  0641 07/25/22  1422   WBC Thousand/uL  --   --  5 59   HEMOGLOBIN g/dL 7 3*   < > 6 8*   HEMATOCRIT % 26 5*   < > 26 0*   PLATELETS Thousands/uL  --   --  524*   NEUTROS PCT %  --   --  70   LYMPHS PCT %  --   --  19   MONOS PCT %  --   --  10   EOS PCT %  --   --  0    < > = values in this interval not displayed  Lines/Drains:  Invasive Devices  Report    Peripheral Intravenous Line  Duration           Peripheral IV 07/25/22 Right Antecubital <1 day                      Imaging: No pertinent imaging reviewed  Recent Cultures (last 7 days):         Last 24 Hours Medication List:   Current Facility-Administered Medications   Medication Dose Route Frequency Provider Last Rate    acetaminophen  650 mg Oral Q6H PRN MELANY Zambrano      docusate sodium  100 mg Oral BID MELANY Zambrano      ferrous sulfate  325 mg Oral Daily With Breakfast Anders Velásquez MD      melatonin  3 mg Oral HS Shari Zimmer [place]      polyethylene glycol  17 g Oral Daily PRN MELANY Lal      senna  2 tablet Oral HS MELANY Lal          Today, Patient Was Seen By: Dionne Campoverde DO    **Please Note: This note may have been constructed using a voice recognition system  **

## 2022-07-26 NOTE — ED NOTES
Pt resting comfortably; 1:1 sitter continues     Erwin Resendez RN  07/26/22 Chris 30, RN  07/26/22 4009

## 2022-07-26 NOTE — ED CARE HANDOFF
Emergency Department Sign Out Note        Sign out and transfer of care from Dr Jearldine Oppenheim  See Separate Emergency Department note  The patient, Rachael Hunt, was evaluated by the previous provider for hallucinations  Subjective:    Has hx of anemia secondary to fibroids  Transfused 3u in the past back in [facility]  2 wks since last vaginal bleeding  Per patient her doctors had recommended removal of fibroids which patient decliend  Workup Completed:  Bloodwork  Cleared medically for psych hosptilization    ED Course / Workup Pending (followup): Objective:    Physical Exam  Constitutional:       General: She is not in acute distress  Appearance: Normal appearance  She is not ill-appearing  HENT:      Head: Normocephalic and atraumatic  Right Ear: External ear normal       Left Ear: External ear normal       Nose: Nose normal       Mouth/Throat:      Mouth: Mucous membranes are moist    Eyes:      General:         Right eye: No discharge  Left eye: No discharge  Conjunctiva/sclera: Conjunctivae normal    Cardiovascular:      Rate and Rhythm: Normal rate and regular rhythm  Pulses: Normal pulses  Heart sounds: No murmur heard  Pulmonary:      Effort: Pulmonary effort is normal       Breath sounds: Normal breath sounds  Abdominal:      General: Abdomen is flat  There is no distension  Tenderness: There is no abdominal tenderness  Musculoskeletal:         General: Normal range of motion  Cervical back: Normal range of motion  Skin:     General: Skin is warm  Capillary Refill: Capillary refill takes less than 2 seconds  Findings: No rash  Neurological:      General: No focal deficit present  Mental Status: She is alert  Mental status is at baseline  Psychiatric:         Mood and Affect: Mood normal          Behavior: Behavior normal          AP  Will consult Medicine for microcytic anemia  Will transfuse 1 unit hemoglobin    Patient remains medically cleared for psychiatric hospitalization  ED Course as of 07/25/22 2221 Mon Jul 25, 2022 2052 Here's voices  Believes they are demons  In the [facility] had fibroids  Asked to get surgery but declined  Not being transfused  Asymptomatic    2206 Spoke with medicine  Not a candidate for admission  Recommended transfusing 1 unit  They will consult  Remains medically cleared for psychiatric admission  Spoke with patient regarding risks and benefits of blood transfusion  Patient consents to transfusion  Procedures  MDM  Number of Diagnoses or Management Options          Disposition  Final diagnoses:   Psychosis ([address] )     Time reflects when diagnosis was documented in both MDM as applicable and the Disposition within this note     Time User Action Codes Description Comment    7/25/2022  2:56 PM Chance Musty Add [F29] Psychosis [facility])       ED Disposition     ED Disposition   Transfer to Behavioral Health    [facility]   --    Date/Time   Mon Jul 25, 2022  2:58 PM    Comment   Mandy Jones should be transferred out to [facility] ps facility and has been medically cleared  MD Documentation    [address] Most Recent Value   Patient Condition The patient has been stabilized such that within reasonable medical probability, no material deterioration of the patient condition or the condition of the unborn child(winston) is likely to result from the transfer   Reason for Transfer Level of Care needed not available at this facility  [inpatient psyc]   Risks of Transfer Potential for delay in receiving treatment, Potential deterioration of medical condition   Sending MD Dr Edgar Grant   Provider Certification Consent was not obtained as patient is committed to psychiatric facility and transfer is mandated      Follow-up Information    None       Patient's Medications    No medications on file     No discharge procedures on file         ED Provider  Electronically Signed by     Trace Richardson,   07/26/22 0010

## 2022-07-26 NOTE — ED NOTES
Patient standing in doorway at this time, states she does not want to sit on stretcher, questioning why she cannot leave and why she cannot use her cell phone  Crisis worker will be in to speak with patient when available       Chichi Murrell RN  07/25/22 5875

## 2022-07-26 NOTE — ED NOTES
Pt's father called requesting information; per conversation with pt, she does not authorize any information to be released to either her mother or father; informed father of same and he verbalized understanding      Leanna Huggins RN  07/26/22 8574

## 2022-07-26 NOTE — ASSESSMENT & PLAN NOTE
· Found to have hgb 6 8, microcytic   · Patient denies recent/active bleed  · Reports taking iron supplementation as a child but denies any knowledge of anemia diagnosis  · Has known fibroids  · Last menstrual period was "normal" approximately 2 weeks ago  · 1 u PRBCs ordered and given in ED  · Check iron panel  · Did received 325 mg PO iron earlier today  · Consider full anemia panel if iron panel is negative  · Will continue daily PO iron supplement until panel results  · Start bowel regimen

## 2022-07-26 NOTE — ED NOTES
Spoke with pharmacist at [facility], medication not stocked in this hospital, will need to be transferred from [facility]        Luis Felipe Soriano RN  07/25/22 2114
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
## 2022-07-26 NOTE — CONSULTS
[address] [facility] 1990, 27 y o  female MRN: [id number]  Unit/Bed#: [facility] Encounter: [id number]  Primary Care Provider: No primary care provider on file  Date and time admitted to hospital: 7/25/2022 12:22 PM    Consult to internal medicine  Consult performed by: MELANY Jacobsen  Consult ordered by: Yazmin Goldsmith DO          Anemia  Assessment & Plan  · Found to have hgb 6 8, microcytic   · Patient denies recent/active bleed  · Reports taking iron supplementation as a child but denies any knowledge of anemia diagnosis  · Has known fibroids  · Last menstrual period was "normal" approximately 2 weeks ago  · 1 u PRBCs ordered and given in ED  · Check iron panel  · Did received 325 mg PO iron earlier today  · Consider full anemia panel if iron panel is negative  · Will continue daily PO iron supplement until panel results  · Start bowel regimen    Fibroids  Assessment & Plan  · Patient reports being diagnosed approximately 1 year ago via MRI in [place]  · Required blood transfusion during hospitalization at that time  · Has not followed up with diagnosis  · Will need to see Gyn outpatient    * Suicidal ideations  Assessment & Plan  · Presented to ED with SI, hallucinations, depression  · 201 signed in ED  · Pending inpatient psych bed      VTE Prophylaxis:   Low Risk (Score 0-2) - Encourage Ambulation  Recommendations for Discharge:  · Pending psych recommendations    Counseling / Coordination of Care Time: 30 minutes Greater than 50% of total time spent on patient counseling and coordination of care  Collaboration of Care: Were Recommendations Directly Discussed with Primary Treatment Team? No    History of Present Illness:  Trish Rodriguez is a 27 y o  female who is originally admitted to the psychiatric service due to [place] [address]  We are consulted for anemia that was noted during work-up in the ED    Patient presented to the ED with suicidal ideations and hallucinations  A 201 was signed and she is currently waiting for an inpatient psychiatric bed  During initial work-up in the ED, she was noted to have microcytic anemia  She was started on PO iron and ordered a unit of blood  [facility] was consulted for anemia work-up and treatment  Review of Systems:  Review of Systems   Constitutional: Negative for chills, diaphoresis, fatigue and fever  HENT: Negative for congestion, postnasal drip, sinus pain, sore throat and trouble swallowing  Eyes: Negative for pain, itching and visual disturbance  Respiratory: Negative for cough, chest tightness, shortness of breath and wheezing  Cardiovascular: Negative for chest pain, palpitations and leg swelling  Gastrointestinal: Negative for abdominal pain, diarrhea, nausea and vomiting  Genitourinary: Negative for dysuria, flank pain, hematuria, menstrual problem, urgency and vaginal bleeding  Musculoskeletal: Negative for arthralgias, gait problem and myalgias  Skin: Negative for rash and wound  Neurological: Negative for dizziness, syncope, weakness, light-headedness, numbness and headaches  Hematological: Does not bruise/bleed easily  Past Medical and Surgical History:   Past Medical History:   Diagnosis Date    Fibroids 7/25/2022    Iron deficiency anemia due to chronic blood loss 7/25/2022       History reviewed  No pertinent surgical history  Meds/Allergies:  all medications and allergies reviewed    Allergies: Allergies   Allergen Reactions    Penicillins Other (See Comments)     Reaction Date: 21Jul2008;    Reaction Date: 21Jul2008;       Augmentin [Amoxicillin-Pot Clavulanate] Rash       Social History:  Marital Status: Single  Substance Use History:   Social History     Substance and Sexual Activity   Alcohol Use Yes    Comment: occassionally     Social History     Tobacco Use   Smoking Status Never Smoker   Smokeless Tobacco Never Used     Social History     Substance and Sexual Activity   Drug Use Never       Family History:  History reviewed  No pertinent family history  Physical Exam:   Vitals:   Blood Pressure: 102/62 (07/25/22 2148)  Pulse: 70 (07/25/22 2148)  Temperature: 98 4 °F (36 9 °C) (07/25/22 1258)  Temp Source: Oral (07/25/22 1258)  Respirations: 16 (07/25/22 2148)  Height: 5' 4" (162 6 cm) (07/25/22 1228)  Weight - Scale: 56 7 kg (125 lb) (07/25/22 1228)  SpO2: 100 % (07/25/22 2148)    Physical Exam  Vitals reviewed  Constitutional:       General: She is not in acute distress  Appearance: She is not ill-appearing or diaphoretic  HENT:      Head: Normocephalic and atraumatic  Mouth/Throat:      Mouth: Mucous membranes are moist       Pharynx: Oropharynx is clear  Eyes:      Extraocular Movements: Extraocular movements intact  Conjunctiva/sclera: Conjunctivae normal       Pupils: Pupils are equal, round, and reactive to light  Cardiovascular:      Rate and Rhythm: Normal rate and regular rhythm  Pulses: Normal pulses  Heart sounds: Normal heart sounds  No murmur heard  Pulmonary:      Effort: Pulmonary effort is normal  No respiratory distress  Breath sounds: Normal breath sounds  No wheezing, rhonchi or rales  Abdominal:      General: Bowel sounds are normal  There is no distension  Palpations: Abdomen is soft  Tenderness: There is no abdominal tenderness  There is no guarding or rebound  Musculoskeletal:         General: No tenderness  Normal range of motion  Cervical back: Normal range of motion and neck supple  No tenderness  Right lower leg: No edema  Left lower leg: No edema  Skin:     General: Skin is warm and dry  Capillary Refill: Capillary refill takes less than 2 seconds  Coloration: Skin is not pale  Neurological:      General: No focal deficit present  Mental Status: She is alert and oriented to person, place, and time  Motor: No weakness     Psychiatric:         Attention and Perception: Attention normal          Mood and Affect: Mood normal          Speech: Speech normal          Behavior: Behavior normal  Behavior is cooperative  Additional Data:   Lab Results:    Results from last 7 days   Lab Units 07/25/22  1422   WBC [place]/uL 5 59   HEMOGLOBIN g/dL 6 8*   HEMATOCRIT % 26 0*   PLATELETS [place]/uL 524*   NEUTROS PCT % 70   LYMPHS PCT % 19   MONOS PCT % 10   EOS PCT % 0                 No results found for: HGBA1C            Imaging: No pertinent imaging reviewed  No orders to display       EKG, Pathology, and Other Studies Reviewed on Admission:   · EKG: No EKG obtained  ** Please Note: This note may have been constructed using a voice recognition system   **

## 2022-07-26 NOTE — ASSESSMENT & PLAN NOTE
· Patient reports being diagnosed approximately 1 year ago via MRI in [place]  · Required blood transfusion during hospitalization at that time  · Has not followed up with diagnosis  · Will need to see Gyn outpatient

## 2022-07-27 PROBLEM — F29 PSYCHOSIS (HCC): Status: ACTIVE | Noted: 2022-07-27

## 2022-07-27 LAB
ERYTHROCYTE [DISTWIDTH] IN BLOOD BY AUTOMATED COUNT: 26.7 % (ref 11.6–15.1)
FLUAV RNA RESP QL NAA+PROBE: NEGATIVE
FLUBV RNA RESP QL NAA+PROBE: NEGATIVE
HCT VFR BLD AUTO: 28.8 % (ref 34.8–46.1)
HGB BLD-MCNC: 7.9 G/DL (ref 11.5–15.4)
MCH RBC QN AUTO: 18 PG (ref 26.8–34.3)
MCHC RBC AUTO-ENTMCNC: 27.4 G/DL (ref 31.4–37.4)
MCV RBC AUTO: 66 FL (ref 82–98)
PLATELET # BLD AUTO: 367 THOUSANDS/UL (ref 149–390)
PMV BLD AUTO: 10.1 FL (ref 8.9–12.7)
RBC # BLD AUTO: 4.39 MILLION/UL (ref 3.81–5.12)
RSV RNA RESP QL NAA+PROBE: NEGATIVE
SARS-COV-2 RNA RESP QL NAA+PROBE: NEGATIVE
WBC # BLD AUTO: 4.38 THOUSAND/UL (ref 4.31–10.16)

## 2022-07-27 PROCEDURE — 36415 COLL VENOUS BLD VENIPUNCTURE: CPT | Performed by: STUDENT IN AN ORGANIZED HEALTH CARE EDUCATION/TRAINING PROGRAM

## 2022-07-27 PROCEDURE — 96366 THER/PROPH/DIAG IV INF ADDON: CPT

## 2022-07-27 PROCEDURE — 0241U HB NFCT DS VIR RESP RNA 4 TRGT: CPT | Performed by: EMERGENCY MEDICINE

## 2022-07-27 PROCEDURE — 96365 THER/PROPH/DIAG IV INF INIT: CPT

## 2022-07-27 PROCEDURE — 85027 COMPLETE CBC AUTOMATED: CPT | Performed by: STUDENT IN AN ORGANIZED HEALTH CARE EDUCATION/TRAINING PROGRAM

## 2022-07-27 PROCEDURE — 99225 PR SBSQ OBSERVATION CARE/DAY 25 MINUTES: CPT | Performed by: STUDENT IN AN ORGANIZED HEALTH CARE EDUCATION/TRAINING PROGRAM

## 2022-07-27 RX ORDER — HALOPERIDOL 5 MG/1
2.5 TABLET ORAL
Status: CANCELLED | OUTPATIENT
Start: 2022-07-27

## 2022-07-27 RX ORDER — BISACODYL 10 MG
10 SUPPOSITORY, RECTAL RECTAL DAILY PRN
Status: CANCELLED | OUTPATIENT
Start: 2022-07-27

## 2022-07-27 RX ORDER — HYDROXYZINE HYDROCHLORIDE 25 MG/1
50 TABLET, FILM COATED ORAL
Status: CANCELLED | OUTPATIENT
Start: 2022-07-27

## 2022-07-27 RX ORDER — ACETAMINOPHEN 325 MG/1
650 TABLET ORAL EVERY 4 HOURS PRN
Status: CANCELLED | OUTPATIENT
Start: 2022-07-27

## 2022-07-27 RX ORDER — LORAZEPAM 2 MG/ML
1 INJECTION INTRAMUSCULAR
Status: CANCELLED | OUTPATIENT
Start: 2022-07-27

## 2022-07-27 RX ORDER — AMOXICILLIN 250 MG
1 CAPSULE ORAL DAILY PRN
Status: CANCELLED | OUTPATIENT
Start: 2022-07-27

## 2022-07-27 RX ORDER — DOCUSATE SODIUM 100 MG/1
100 CAPSULE, LIQUID FILLED ORAL 2 TIMES DAILY
Status: CANCELLED | OUTPATIENT
Start: 2022-07-27

## 2022-07-27 RX ORDER — MINERAL OIL AND PETROLATUM 150; 830 MG/G; MG/G
1 OINTMENT OPHTHALMIC
Status: CANCELLED | OUTPATIENT
Start: 2022-07-27

## 2022-07-27 RX ORDER — LORAZEPAM 2 MG/ML
2 INJECTION INTRAMUSCULAR
Status: CANCELLED | OUTPATIENT
Start: 2022-07-27

## 2022-07-27 RX ORDER — HALOPERIDOL 5 MG/ML
2.5 INJECTION INTRAMUSCULAR
Status: CANCELLED | OUTPATIENT
Start: 2022-07-27

## 2022-07-27 RX ORDER — HALOPERIDOL 5 MG/1
5 TABLET ORAL
Status: CANCELLED | OUTPATIENT
Start: 2022-07-27

## 2022-07-27 RX ORDER — ACETAMINOPHEN 325 MG/1
650 TABLET ORAL EVERY 6 HOURS PRN
Status: CANCELLED | OUTPATIENT
Start: 2022-07-27

## 2022-07-27 RX ORDER — HALOPERIDOL 1 MG/1
1 TABLET ORAL EVERY 6 HOURS PRN
Status: CANCELLED | OUTPATIENT
Start: 2022-07-27

## 2022-07-27 RX ORDER — OLANZAPINE 5 MG/1
2.5 TABLET, ORALLY DISINTEGRATING ORAL
Status: CANCELLED | OUTPATIENT
Start: 2022-07-27

## 2022-07-27 RX ORDER — HALOPERIDOL 5 MG/ML
5 INJECTION INTRAMUSCULAR
Status: CANCELLED | OUTPATIENT
Start: 2022-07-27

## 2022-07-27 RX ORDER — LORAZEPAM 1 MG/1
1 TABLET ORAL
Status: CANCELLED | OUTPATIENT
Start: 2022-07-27

## 2022-07-27 RX ORDER — BENZTROPINE MESYLATE 1 MG/ML
1 INJECTION INTRAMUSCULAR; INTRAVENOUS
Status: CANCELLED | OUTPATIENT
Start: 2022-07-27

## 2022-07-27 RX ORDER — BENZTROPINE MESYLATE 1 MG/1
1 TABLET ORAL
Status: CANCELLED | OUTPATIENT
Start: 2022-07-27

## 2022-07-27 RX ORDER — FERROUS SULFATE 325(65) MG
325 TABLET ORAL
Status: CANCELLED | OUTPATIENT
Start: 2022-07-28

## 2022-07-27 RX ORDER — HYDROXYZINE HYDROCHLORIDE 25 MG/1
25 TABLET, FILM COATED ORAL
Status: CANCELLED | OUTPATIENT
Start: 2022-07-27

## 2022-07-27 RX ORDER — ACETAMINOPHEN 325 MG/1
975 TABLET ORAL EVERY 6 HOURS PRN
Status: CANCELLED | OUTPATIENT
Start: 2022-07-27

## 2022-07-27 RX ORDER — POLYETHYLENE GLYCOL 3350 17 G/17G
17 POWDER, FOR SOLUTION ORAL DAILY PRN
Status: CANCELLED | OUTPATIENT
Start: 2022-07-27

## 2022-07-27 RX ORDER — TRAZODONE HYDROCHLORIDE 50 MG/1
50 TABLET ORAL
Status: CANCELLED | OUTPATIENT
Start: 2022-07-27

## 2022-07-27 RX ORDER — SENNOSIDES 8.6 MG
2 TABLET ORAL
Status: CANCELLED | OUTPATIENT
Start: 2022-07-27

## 2022-07-27 RX ORDER — MAGNESIUM HYDROXIDE/ALUMINUM HYDROXICE/SIMETHICONE 120; 1200; 1200 MG/30ML; MG/30ML; MG/30ML
30 SUSPENSION ORAL EVERY 4 HOURS PRN
Status: CANCELLED | OUTPATIENT
Start: 2022-07-27

## 2022-07-27 RX ORDER — LORAZEPAM 2 MG/ML
1 INJECTION INTRAMUSCULAR EVERY 4 HOURS PRN
Status: CANCELLED | OUTPATIENT
Start: 2022-07-27

## 2022-07-27 RX ORDER — LANOLIN ALCOHOL/MO/W.PET/CERES
3 CREAM (GRAM) TOPICAL
Status: CANCELLED | OUTPATIENT
Start: 2022-07-27

## 2022-07-27 RX ORDER — BENZTROPINE MESYLATE 1 MG/ML
0.5 INJECTION INTRAMUSCULAR; INTRAVENOUS
Status: CANCELLED | OUTPATIENT
Start: 2022-07-27

## 2022-07-27 RX ADMIN — DOCUSATE SODIUM 100 MG: 100 CAPSULE, LIQUID FILLED ORAL at 10:55

## 2022-07-27 RX ADMIN — SENNOSIDES 17.2 MG: 8.6 TABLET, FILM COATED ORAL at 22:36

## 2022-07-27 RX ADMIN — POLYETHYLENE GLYCOL 3350 17 G: 17 POWDER, FOR SOLUTION ORAL at 10:55

## 2022-07-27 RX ADMIN — FERROUS SULFATE TAB 325 MG (65 MG ELEMENTAL FE) 325 MG: 325 (65 FE) TAB at 10:55

## 2022-07-27 RX ADMIN — DOCUSATE SODIUM 100 MG: 100 CAPSULE, LIQUID FILLED ORAL at 20:40

## 2022-07-27 RX ADMIN — IRON SUCROSE 300 MG: 20 INJECTION, SOLUTION INTRAVENOUS at 12:16

## 2022-07-27 RX ADMIN — Medication 3 MG: at 22:36

## 2022-07-27 NOTE — ASSESSMENT & PLAN NOTE
· Patient reports being diagnosed approximately 1 year ago via MRI in [place]  · Required blood transfusion during hospitalization at that time  · Has not followed up with diagnosis  · Will need to see Gyn outpatient

## 2022-07-27 NOTE — ED NOTES
[facility] contacted to set up transport after midnight  Tamia Garcia stated will probably be in the morning due to not having trucks overnight

## 2022-07-27 NOTE — ED CARE HANDOFF
Emergency Department Sign Out Note        See Separate Emergency Department note  The patient, Nik Greene, was evaluated for hallucinations  Labs Reviewed   CBC AND DIFFERENTIAL - Abnormal       Result Value Ref Range Status    WBC 5 59  4 31 - 10 16 Thousand/uL Final    RBC 4 26  3 81 - 5 12 Million/uL Final    Hemoglobin 6 8 (*) 11 5 - 15 4 g/dL Final    Comment: This result has been called to Sam Renae RN by Jennifer Garcia on 07/25/2022 14:50:09, and has been read back  Hematocrit 26 0 (*) 34 8 - 46 1 % Final    MCV 61 (*) 82 - 98 fL Final    MCH 16 0 (*) 26 8 - 34 3 pg Final    MCHC 26 2 (*) 31 4 - 37 4 g/dL Final    RDW 23 6 (*) 11 6 - 15 1 % Final    MPV 10 2  8 9 - 12 7 fL Final    Platelets 778 (*) 330 - 390 Thousands/uL Final    nRBC 0  /100 WBCs Final    Neutrophils Relative 70  43 - 75 % Final    Immat GRANS % 0  0 - 2 % Final    Lymphocytes Relative 19  14 - 44 % Final    Monocytes Relative 10  4 - 12 % Final    Eosinophils Relative 0  0 - 6 % Final    Basophils Relative 1  0 - 1 % Final    Neutrophils Absolute 3 88  1 85 - 7 62 Thousands/µL Final    Immature Grans Absolute 0 02  0 00 - 0 20 Thousand/uL Final    Lymphocytes Absolute 1 06  0 60 - 4 47 Thousands/µL Final    Monocytes Absolute 0 57  0 17 - 1 22 Thousand/µL Final    Eosinophils Absolute 0 00  0 00 - 0 61 Thousand/µL Final    Basophils Absolute 0 06  0 00 - 0 10 Thousands/µL Final   IRON SATURATION - Abnormal    Iron Saturation 3 (*) 15 - 50 % Final    TIBC 467 (*) 250 - 450 ug/dL Final    Iron 16 (*) 50 - 170 ug/dL Final    Comment: Patients treated with metal-binding drugs (ie  Deferoxamine) may have depressed iron values     FERRITIN - Abnormal    Ferritin 3 (*) 8 - 388 ng/mL Final   HEMOGLOBIN AND HEMATOCRIT, BLOOD - Abnormal    Hemoglobin 7 2 (*) 11 5 - 15 4 g/dL Final    Hematocrit 26 0 (*) 34 8 - 46 1 % Final   HEMOGLOBIN AND HEMATOCRIT, BLOOD - Abnormal    Hemoglobin 7 3 (*) 11 5 - 15 4 g/dL Final    Hematocrit 26 5 (*) 34 8 - 46 1 % Final   COVID19, INFLUENZA A/B, RSV PCR, [facility] - Normal    SARS-CoV-2 Negative  Negative Final    INFLUENZA A PCR Negative  Negative Final    INFLUENZA B PCR Negative  Negative Final    RSV PCR Negative  Negative Final    Narrative:     FOR PEDIATRIC PATIENTS - copy/paste COVID Guidelines URL to browser: [email or web address]    SARS-CoV-2 assay is a Nucleic Acid Amplification assay intended for the  qualitative detection of nucleic acid from SARS-CoV-2 in nasopharyngeal  swabs  Results are for the presumptive identification of SARS-CoV-2 RNA  Positive results are indicative of infection with SARS-CoV-2, the virus  causing COVID-19, but do not rule out bacterial infection or co-infection  with other viruses  Laboratories within the [place] and its  territories are required to report all positive results to the appropriate  public health authorities  Negative results do not preclude SARS-CoV-2  infection and should not be used as the sole basis for treatment or other  patient management decisions  Negative results must be combined with  clinical observations, patient history, and epidemiological information  This test has not been FDA cleared or approved  This test has been authorized by FDA under an Emergency Use Authorization  (EUA)  This test is only authorized for the duration of time the  declaration that circumstances exist justifying the authorization of the  emergency use of an in vitro diagnostic tests for detection of SARS-CoV-2  virus and/or diagnosis of COVID-19 infection under section 564(b)(1) of  the Act, 21 U  S C  498SBO-5(L)(4), unless the authorization is terminated  or revoked sooner  The test has been validated but independent review by FDA  and CLIA is pending  Test performed using [employer] Gene[employer]pert: This RT-PCR assay targets N2,  a region unique to SARS-CoV-2   A conserved region in the E-gene was chosen  for pan-Sarbecovirus detection which includes SARS-CoV-2  RAPID DRUG SCREEN, URINE - Normal    Amph/Meth UR Negative  Negative Final    Barbiturate Ur Negative  Negative Final    Benzodiazepine Urine Negative  Negative Final    Cocaine Urine Negative  Negative Final    Methadone Urine Negative  Negative Final    Opiate Urine Negative  Negative Final    PCP Ur Negative  Negative Final    THC Urine Negative  Negative Final    Oxycodone Urine Negative  Negative Final    Narrative:     FOR MEDICAL PURPOSES ONLY  IF CONFIRMATION NEEDED PLEASE CONTACT THE LAB WITHIN 5 DAYS  Drug Screen Cutoff Levels:  AMPHETAMINE/METHAMPHETAMINES  1000 ng/mL  BARBITURATES     200 ng/mL  BENZODIAZEPINES     200 ng/mL  COCAINE      300 ng/mL  METHADONE      300 ng/mL  OPIATES      300 ng/mL  PHENCYCLIDINE     25 ng/mL  THC       50 ng/mL  OXYCODONE      100 ng/mL   MEDICAL ALCOHOL - Normal    Ethanol Lvl <10  <10 mg/dL Final   POCT PREGNANCY, URINE - Normal    EXT PREG TEST UR (Ref: Negative) Negative   Final    Control Valid   Final   TYPE AND SCREEN    ABO Grouping AB   Final    Rh Factor Positive   Final    Antibody Screen Negative   Final    Specimen Expiration Date [id number]   Final   PREPARE LEUKOREDUCED RBC    Unit Product Code [id number]       Unit Number [id number]       Unit ABO AB       Unit RH POS       Crossmatch Compatible   Final    Unit Dispense Status Presumed Trans       Unit Product Volume 350  ml    ABORH RECHECK    ABO Grouping AB   Final    Rh Factor Positive   Final   IRON PANEL (INCLUDES FERRITIN,IRON SAT%, IRON, AND TIBC)    Narrative: The following orders were created for panel order Iron Panel (Includes Ferritin, Iron Sat%, Iron, and TIBC)    Procedure                               Abnormality         Status                     ---------                               -----------         ------                     Iron Saturation %[[id number]]            Abnormal            Final result Ferritin[[id number]]                     Abnormal            Final result                 Please view results for these tests on the individual orders  Patient is medically cleared, on bed search for psychiatric admission  No acute events during shift  Patient is to be signed out to my colleague at change of shift, on bed search for psychiatric admission  Procedures  MDM        Disposition  Final diagnoses:   [facility]   Iron deficiency anemia due to chronic blood loss     Time reflects when diagnosis was documented in both MDM as applicable and the Disposition within this note     Time User Action Codes Description Comment    7/25/2022  2:56 PM Suki Lopez Add [F29] Psychosis ([facility] )     7/25/2022  9:54 PM Sridhar Sanchez Add [D50 0] Iron deficiency anemia due to chronic blood loss       ED Disposition     ED Disposition   Transfer to [address] [place] [address]   --    Date/Time   Mon Jul 25, 2022  2:58 PM    Comment   Anali Pratt should be transferred out to [facility] psyc facility and has been medically cleared  MD Documentation    Jesse Lopez Most Recent Value   Patient Condition The patient has been stabilized such that within reasonable medical probability, no material deterioration of the patient condition or the condition of the unborn child(winston) is likely to result from the transfer   Reason for Transfer Level of Care needed not available at this facility  [inpatient psyc]   Risks of Transfer Potential for delay in receiving treatment, Potential deterioration of medical condition   Sending MD Dr Jojo Samuel   Provider Certification Consent was not obtained as patient is committed to psychiatric facility and transfer is mandated      Follow-up Information    None       Patient's Medications    No medications on file     No discharge procedures on file         ED Provider  Electronically Signed by     Julien Steve MD  07/26/22 5040 Jovanny Arzate MD  07/27/22 7667

## 2022-07-27 NOTE — ED NOTES
Patient with episode of vomiting a small amount x 1; denies feeling significantly nauseated on an ongoing basis  Declines offer of antiemetic medication at this time  Will continue to monitor patient; let her know that if she feels better in 15-20 mins she can take her nighttime medications as ordered       David Lerma RN  07/26/22 2467

## 2022-07-27 NOTE — ED NOTES
Patient is accepted at [facility] pending negative covid  Patient is accepted by Teena Walsh per [address] is arranged with [facility]  Transportation is scheduled for 0800 7/28/22  Patient may go to the floor after midnight    Nurse report is to be called to [phone] prior to patient transfer

## 2022-07-27 NOTE — ED NOTES
Pt wanting to speak with provider  She is upset that she has been here so long already and "why am I still here and why do they keep wanting to do bloodwork"  [facility] provider notified and will be here to speak with patient    Crisis also will speak with patient as pt is requesting to see the paperwork she signed     Petra Schirmer, RN  07/27/22 5550

## 2022-07-27 NOTE — ED NOTES
Crisis at bedside    Pt offered use of phone but declines at this time     Mateo Corley RN  07/27/22 4143

## 2022-07-27 NOTE — ED NOTES
Crisis spoke to Dr Lise Valderrama who is currently treating pt's medical issues  He was able to convince Pt to complete bloodwork  He stated that if pt's hemoglobin is above 7, he will discontinue CBC's and just provide Pt with oral iron  He stated that Pt can be treated with in ER for now and once blood work result are back, crisis will contact him for update and next steps  Crisis also spoke to Pt to update her on bed search  She stated that she didn't know she signed her rights away when signing the 201 because she has emails and work she needs to complete with deadlines and she is not able to use her phone to complete it  Crisis asked if pt could contact her place of employment via the ED phone and let them know that she is ill and could they allow her an extension until she is better  Pt was agreeable to that  Crisis informed tech supervising Pt to provide her with ED phone once bloodwork has been taken

## 2022-07-27 NOTE — PROGRESS NOTES
[facility]  Progress Note - Lynnea Canavan 1990, 27 y o  female MRN: [id number]  Unit/Bed#: [facility] Encounter: [id number]  Primary Care Provider: No primary care provider on file  Date and time admitted to hospital: 7/25/2022 12:22 PM    Anemia  Assessment & Plan  · Found to have hgb 6 8, microcytic   · Patient denies recent/active bleed  · Reports taking iron supplementation as a child but denies any knowledge of anemia diagnosis  · Has known fibroids  · Last menstrual period was "normal" approximately 2 weeks ago  · 1 u PRBCs ordered and given in ED  · 7/26 Hemoglobin improved to 7 2, repeat hemoglobin 7 3  · Iron panel significant for iron deficiency anemia with iron level as low as 16  · Continue with p o  Ferrous sulfate  · Bowel regimen to avoid constipation  · F/U repeat hemoglobin today, if less than 7 will transfuse, if remains above 7 will decrease cbc draws to weekly blood draws  · Discussed giving patient one time dose of venofer to increase her iron stores, she's now agreeable, venofer ordered  Fibroids  Assessment & Plan  · Patient reports being diagnosed approximately 1 year ago via MRI in [place]  · Required blood transfusion during hospitalization at that time  · Has not followed up with diagnosis  · Will need to see Gyn outpatient    * Suicidal ideations  Assessment & Plan  · Presented to ED with SI, hallucinations, depression  · 201 signed in ED  · Pending inpatient psych bed        VTE Pharmacologic Prophylaxis:   Low Risk (Score 0-2) - Encourage Ambulation  Patient Centered Rounds: I performed bedside rounds with nursing staff today  Discussions with Specialists or Other Care Team Provider: RN    Education and Discussions with Family / Patient: Patient declined call to   Time Spent for Care: 30 minutes  More than 50% of total time spent on counseling and coordination of care as described above      Current Length of Stay: 0 day(s)  Current Patient Status: Emergency   Certification Statement: The patient will continue to require additional inpatient hospital stay due to pending placement  Discharge Plan: once bed is available    Code Status: No Order    Subjective:   Patient seen examined at bedside  Patient currently offers no acute complaints  Denies any bleeding  Patient asking why she is getting frequent blood draws  Explained that today's blood dry it is to ensure that her hemoglobin remains above 7 and that if it is above 7 will decrease blood yobani to  Weekly cbc  Objective:     Vitals:   Temp (24hrs), Av 2 °F (36 8 °C), Min:98 2 °F (36 8 °C), Max:98 2 °F (36 8 °C)    Temp:  [98 2 °F (36 8 °C)] 98 2 °F (36 8 °C)  HR:  [66] 66  Resp:  [16] 16  BP: (119)/(83) 119/83  SpO2:  [100 %] 100 %  Body mass index is 21 46 kg/m²  Input and Output Summary (last 24 hours):   No intake or output data in the 24 hours ending 22 1004    Physical Exam:   Physical Exam  Vitals reviewed  HENT:      Head: Normocephalic and atraumatic  Right Ear: External ear normal       Left Ear: External ear normal       Nose: Nose normal       Mouth/Throat:      Mouth: Mucous membranes are moist       Pharynx: Oropharynx is clear  Eyes:      Extraocular Movements: Extraocular movements intact  Cardiovascular:      Rate and Rhythm: Normal rate and regular rhythm  Heart sounds: Murmur heard  Pulmonary:      Effort: Pulmonary effort is normal       Breath sounds: Normal breath sounds  Abdominal:      General: Abdomen is flat  Palpations: Abdomen is soft  Tenderness: There is no abdominal tenderness  Musculoskeletal:      Cervical back: Normal range of motion  Right lower leg: No edema  Left lower leg: No edema  Skin:     General: Skin is warm and dry  Neurological:      General: No focal deficit present  Mental Status: She is alert  Mental status is at baseline     Psychiatric:         Mood and Affect: Mood normal          Behavior: Behavior normal           Additional Data:     Labs:  Results from last 7 days   Lab Units 07/26/22  1457 07/26/22  0641 07/25/22  1422   WBC Thousand/uL  --   --  5 59   HEMOGLOBIN g/dL 7 3*   < > 6 8*   HEMATOCRIT % 26 5*   < > 26 0*   PLATELETS Thousands/uL  --   --  524*   NEUTROS PCT %  --   --  70   LYMPHS PCT %  --   --  19   MONOS PCT %  --   --  10   EOS PCT %  --   --  0    < > = values in this interval not displayed  Lines/Drains:  Invasive Devices  Report    Peripheral Intravenous Line  Duration           Peripheral IV 07/25/22 Right Antecubital 1 day                      Imaging: No pertinent imaging reviewed  Recent Cultures (last 7 days):         Last 24 Hours Medication List:   Current Facility-Administered Medications   Medication Dose Route Frequency Provider Last Rate    acetaminophen  650 mg Oral Q6H PRN MELANY Zambrano      docusate sodium  100 mg Oral BID MELANY Zambrano      ferrous sulfate  325 mg Oral Daily With Breakfast Cesar Ruiz MD      iron sucrose  300 mg Intravenous Once Riaz Mock DO      melatonin  3 mg Oral HS MELANY Zambrano      polyethylene glycol  17 g Oral Daily PRN MELANY Douglas      senna  2 tablet Oral HS MELANY Douglas          Today, Patient Was Seen By: Parag Rivera DO    **Please Note: This note may have been constructed using a voice recognition system  **

## 2022-07-27 NOTE — ED NOTES
[facility] Bed Search    No [facility] beds available:  Joshua:  Per Mose Seip:  Per Jayne Nguyens: per Ed  Friends:  Per [facility]:  Previously denied due to medical concerns

## 2022-07-27 NOTE — ASSESSMENT & PLAN NOTE
· Found to have hgb 6 8, microcytic   · Patient denies recent/active bleed  · Reports taking iron supplementation as a child but denies any knowledge of anemia diagnosis  · Has known fibroids  · Last menstrual period was "normal" approximately 2 weeks ago  · 1 u PRBCs ordered and given in ED  · 7/26 Hemoglobin improved to 7 2, repeat hemoglobin 7 3  · Iron panel significant for iron deficiency anemia with iron level as low as 16  · Continue with p o  Ferrous sulfate  · Bowel regimen to avoid constipation  · F/U repeat hemoglobin today, if less than 7 will transfuse, if remains above 7 will decrease cbc draws to weekly blood draws  · Discussed giving patient one time dose of venofer to increase her iron stores, she's now agreeable, venofer ordered

## 2022-07-27 NOTE — ED CARE HANDOFF
Emergency Department Sign Out Note        Sign out and transfer of care from Dr Bello Pal  See Separate Emergency Department note  The patient, Vladislav Abdul, was evaluated by the previous providers for psychiatric evaluatiuon  Workup Completed:  Results Reviewed     Procedure Component Value Units Date/Time    CBC and Platelet [[id number]]     Lab Status: No result Specimen: Blood     Hemoglobin and hematocrit, blood [[id number]]  (Abnormal) Collected: 07/26/22 1457    Lab Status: Final result Specimen: Blood from Arm, Right Updated: 07/26/22 1503     Hemoglobin 7 3 g/dL      Hematocrit 26 5 %     Iron Saturation % [[id number]]  (Abnormal) Collected: 07/25/22 2236    Lab Status: Final result Specimen: Blood from Arm, Right Updated: 07/26/22 1202     Iron Saturation 3 %      TIBC 467 ug/dL      Iron 16 ug/dL     Ferritin [[id number]]  (Abnormal) Collected: 07/25/22 2236    Lab Status: Final result Specimen: Blood from Arm, Right Updated: 07/26/22 1202     Ferritin 3 ng/mL     Hemoglobin and hematocrit, blood [[id number]]  (Abnormal) Collected: 07/26/22 0641    Lab Status: Final result Specimen: Blood from Arm, Right Updated: 07/26/22 0645     Hemoglobin 7 2 g/dL      Hematocrit 26 0 %     POCT pregnancy, urine [[id number]]  (Normal) Resulted: 07/25/22 2112    Lab Status: Final result Specimen: Urine Updated: 07/25/22 2112     EXT PREG TEST UR (Ref: Negative) Negative     Control Valid    Rapid drug screen, urine [[id number]]  (Normal) Collected: 07/25/22 2044    Lab Status: Final result Specimen: Urine, Clean Catch Updated: 07/25/22 2111     Amph/Meth UR Negative     Barbiturate Ur Negative     Benzodiazepine Urine Negative     Cocaine Urine Negative     Methadone Urine Negative     Opiate Urine Negative     PCP Ur Negative     THC Urine Negative     Oxycodone Urine Negative    Narrative:      FOR MEDICAL PURPOSES ONLY  IF CONFIRMATION NEEDED PLEASE CONTACT THE LAB WITHIN 5 DAYS      Drug Screen Cutoff Levels:  AMPHETAMINE/METHAMPHETAMINES  1000 ng/mL  BARBITURATES     200 ng/mL  BENZODIAZEPINES     200 ng/mL  COCAINE      300 ng/mL  METHADONE      300 ng/mL  OPIATES      300 ng/mL  PHENCYCLIDINE     25 ng/mL  THC       50 ng/mL  OXYCODONE      100 ng/mL    CBC and differential [[id number]]  (Abnormal) Collected: 07/25/22 1422    Lab Status: Final result Specimen: Blood from Arm, Right Updated: 07/25/22 1450     WBC 5 59 Thousand/uL      RBC 4 26 Million/uL      Hemoglobin 6 8 g/dL      Hematocrit 26 0 %      MCV 61 fL      MCH 16 0 pg      MCHC 26 2 g/dL      RDW 23 6 %      MPV 10 2 fL      Platelets 579 Thousands/uL      nRBC 0 /100 WBCs      Neutrophils Relative 70 %      Immat GRANS % 0 %      Lymphocytes Relative 19 %      Monocytes Relative 10 %      Eosinophils Relative 0 %      Basophils Relative 1 %      Neutrophils Absolute 3 88 Thousands/µL      Immature Grans Absolute 0 02 Thousand/uL      Lymphocytes Absolute 1 06 Thousands/µL      Monocytes Absolute 0 57 Thousand/µL      Eosinophils Absolute 0 00 Thousand/µL      Basophils Absolute 0 06 Thousands/µL     Ethanol [[id number]]  (Normal) Collected: 07/25/22 1422    Lab Status: Final result Specimen: Blood from Arm, Right Updated: 07/25/22 1443     Ethanol Lvl <10 mg/dL     FLU/RSV/COVID - if FLU/RSV clinically relevant [[id number]]  (Normal) Collected: 07/25/22 1309    Lab Status: Final result Specimen: Nares from Nose Updated: 07/25/22 1412     SARS-CoV-2 Negative     INFLUENZA A PCR Negative     INFLUENZA B PCR Negative     RSV PCR Negative    Narrative:      FOR PEDIATRIC PATIENTS - copy/paste COVID Guidelines URL to browser: [email or web address]    SARS-CoV-2 assay is a Nucleic Acid Amplification assay intended for the  qualitative detection of nucleic acid from SARS-CoV-2 in nasopharyngeal  swabs  Results are for the presumptive identification of SARS-CoV-2 RNA      Positive results are indicative of infection with SARS-CoV-2, the virus  causing COVID-19, but do not rule out bacterial infection or co-infection  with other viruses  Laboratories within the [place] and its  territories are required to report all positive results to the appropriate  public health authorities  Negative results do not preclude SARS-CoV-2  infection and should not be used as the sole basis for treatment or other  patient management decisions  Negative results must be combined with  clinical observations, patient history, and epidemiological information  This test has not been FDA cleared or approved  This test has been authorized by FDA under an Emergency Use Authorization  (EUA)  This test is only authorized for the duration of time the  declaration that circumstances exist justifying the authorization of the  emergency use of an in vitro diagnostic tests for detection of SARS-CoV-2  virus and/or diagnosis of COVID-19 infection under section 564(b)(1) of  the Act, 21 U  S C  007LST-2(W)(5), unless the authorization is terminated  or revoked sooner  The test has been validated but independent review by FDA  and CLIA is pending  Test performed using [employer] GeneXpert: This RT-PCR assay targets N2,  a region unique to SARS-CoV-2  A conserved region in the E-gene was chosen  for pan-Sarbecovirus detection which includes SARS-CoV-2  ED Course / Workup Pending (followup):  Pending bed placement  Continue current management  So signs of bleeding  No complaints of CP, SOB, or dizziness    General: NAD   HEENT: normocephalic, atraumatic  Pulm: normal work of breathing  : deferred   MSK: no deformity   Skin: warm and dry   Neuro: awake and alert, moves all extremities with good strength, face symmetric, speech normal   Psych: calm                                    ED Course as of 07/27/22 0828   Tue Jul 26, 2022   0651 SO: anemia, hx of fibroids, s/p transfusion x1, SI withou plan, medicine consulted    Medically cleared  Pending placement  Signed 201     Procedures  MDM        Disposition  Final diagnoses:   Psychosis ([facility] )   Iron deficiency anemia due to chronic blood loss     Time reflects when diagnosis was documented in both MDM as applicable and the Disposition within this note     Time User Action Codes Description Comment    7/25/2022  2:56 PM Scarlett Co Add [F29] Psychosis ([facility] )     7/25/2022  9:54 PM Yudelka Luis Fernando Add [D50 0] Iron deficiency anemia due to chronic blood loss       ED Disposition     ED Disposition   Transfer to [address] [place] [address]   --    Date/Time   Mon Jul 25, 2022  2:58 PM    Comment   Gerardo Steinberg should be transferred out to [facility] ps facility and has been medically cleared  MD Documentation    [address] Most Recent Value   Patient Condition The patient has been stabilized such that within reasonable medical probability, no material deterioration of the patient condition or the condition of the unborn child(winston) is likely to result from the transfer   Reason for Transfer Level of Care needed not available at this facility  [inpatient psyc]   Risks of Transfer Potential for delay in receiving treatment, Potential deterioration of medical condition   Sending MD Dr Taurus Coughlin   Provider Certification Consent was not obtained as patient is committed to psychiatric facility and transfer is mandated      Follow-up Information    None       Patient's Medications    No medications on file     No discharge procedures on file         ED Provider  Electronically Signed by     Jeimy Castro DO  07/27/22 8688

## 2022-07-27 NOTE — ED NOTES
Patient decided she would like something for nausea; physician made aware       Bridgett Geronimo RN  07/26/22 7194

## 2022-07-27 NOTE — ED CARE HANDOFF
Emergency Department Sign Out Note        Signout and transfer of care from my colleague, Dr [facility]  See Separate Emergency Department note  The patient, Irene Brown, was evaluated for psychosis  Labs Reviewed   CBC AND DIFFERENTIAL - Abnormal       Result Value Ref Range Status    WBC 5 59  4 31 - 10 16 Thousand/uL Final    RBC 4 26  3 81 - 5 12 Million/uL Final    Hemoglobin 6 8 (*) 11 5 - 15 4 g/dL Final    Comment: This result has been called to Jahaira Dockery RN by Sueellen Collet on 07/25/2022 14:50:09, and has been read back  Hematocrit 26 0 (*) 34 8 - 46 1 % Final    MCV 61 (*) 82 - 98 fL Final    MCH 16 0 (*) 26 8 - 34 3 pg Final    MCHC 26 2 (*) 31 4 - 37 4 g/dL Final    RDW 23 6 (*) 11 6 - 15 1 % Final    MPV 10 2  8 9 - 12 7 fL Final    Platelets 812 (*) 198 - 390 Thousands/uL Final    nRBC 0  /100 WBCs Final    Neutrophils Relative 70  43 - 75 % Final    Immat GRANS % 0  0 - 2 % Final    Lymphocytes Relative 19  14 - 44 % Final    Monocytes Relative 10  4 - 12 % Final    Eosinophils Relative 0  0 - 6 % Final    Basophils Relative 1  0 - 1 % Final    Neutrophils Absolute 3 88  1 85 - 7 62 Thousands/µL Final    Immature Grans Absolute 0 02  0 00 - 0 20 Thousand/uL Final    Lymphocytes Absolute 1 06  0 60 - 4 47 Thousands/µL Final    Monocytes Absolute 0 57  0 17 - 1 22 Thousand/µL Final    Eosinophils Absolute 0 00  0 00 - 0 61 Thousand/µL Final    Basophils Absolute 0 06  0 00 - 0 10 Thousands/µL Final   IRON SATURATION - Abnormal    Iron Saturation 3 (*) 15 - 50 % Final    TIBC 467 (*) 250 - 450 ug/dL Final    Iron 16 (*) 50 - 170 ug/dL Final    Comment: Patients treated with metal-binding drugs (ie  Deferoxamine) may have depressed iron values     FERRITIN - Abnormal    Ferritin 3 (*) 8 - 388 ng/mL Final   HEMOGLOBIN AND HEMATOCRIT, BLOOD - Abnormal    Hemoglobin 7 2 (*) 11 5 - 15 4 g/dL Final    Hematocrit 26 0 (*) 34 8 - 46 1 % Final   HEMOGLOBIN AND HEMATOCRIT, BLOOD - Abnormal Hemoglobin 7 3 (*) 11 5 - 15 4 g/dL Final    Hematocrit 26 5 (*) 34 8 - 46 1 % Final   CBC AND PLATELET - Abnormal    WBC 4 38  4 31 - 10 16 Thousand/uL Final    RBC 4 39  3 81 - 5 12 Million/uL Final    Hemoglobin 7 9 (*) 11 5 - 15 4 g/dL Final    Hematocrit 28 8 (*) 34 8 - 46 1 % Final    MCV 66 (*) 82 - 98 fL Final    MCH 18 0 (*) 26 8 - 34 3 pg Final    MCHC 27 4 (*) 31 4 - 37 4 g/dL Final    RDW 26 7 (*) 11 6 - 15 1 % Final    Platelets 181  616 - 390 Thousands/uL Final    MPV 10 1  8 9 - 12 7 fL Final   COVID19, INFLUENZA A/B, RSV PCR, [facility] - Normal    SARS-CoV-2 Negative  Negative Final    INFLUENZA A PCR Negative  Negative Final    INFLUENZA B PCR Negative  Negative Final    RSV PCR Negative  Negative Final    Narrative:     FOR PEDIATRIC PATIENTS - copy/paste COVID Guidelines URL to browser: [email or web address]  [email or web address]    SARS-CoV-2 assay is a Nucleic Acid Amplification assay intended for the  qualitative detection of nucleic acid from SARS-CoV-2 in nasopharyngeal  swabs  Results are for the presumptive identification of SARS-CoV-2 RNA  Positive results are indicative of infection with SARS-CoV-2, the virus  causing COVID-19, but do not rule out bacterial infection or co-infection  with other viruses  Laboratories within the [place] and its  territories are required to report all positive results to the appropriate  public health authorities  Negative results do not preclude SARS-CoV-2  infection and should not be used as the sole basis for treatment or other  patient management decisions  Negative results must be combined with  clinical observations, patient history, and epidemiological information  This test has not been FDA cleared or approved  This test has been authorized by FDA under an Emergency Use Authorization  (EUA)   This test is only authorized for the duration of time the  declaration that circumstances exist justifying the authorization of the  emergency use of an in vitro diagnostic tests for detection of SARS-CoV-2  virus and/or diagnosis of COVID-19 infection under section 564(b)(1) of  the Act, 21 U  S C  977VRA-8(S)(1), unless the authorization is terminated  or revoked sooner  The test has been validated but independent review by FDA  and CLIA is pending  Test performed using [employer] GeneXpert: This RT-PCR assay targets N2,  a region unique to SARS-CoV-2  A conserved region in the E-gene was chosen  for pan-Sarbecovirus detection which includes SARS-CoV-2  COVID19, INFLUENZA A/B, RSV PCR, [facility] - Normal    SARS-CoV-2 Negative  Negative Final    INFLUENZA A PCR Negative  Negative Final    INFLUENZA B PCR Negative  Negative Final    RSV PCR Negative  Negative Final    Narrative:     FOR PEDIATRIC PATIENTS - copy/paste COVID Guidelines URL to browser: [email or web address]  [email or web address]    SARS-CoV-2 assay is a Nucleic Acid Amplification assay intended for the  qualitative detection of nucleic acid from SARS-CoV-2 in nasopharyngeal  swabs  Results are for the presumptive identification of SARS-CoV-2 RNA  Positive results are indicative of infection with SARS-CoV-2, the virus  causing COVID-19, but do not rule out bacterial infection or co-infection  with other viruses  Laboratories within the [place] and its  territories are required to report all positive results to the appropriate  public health authorities  Negative results do not preclude SARS-CoV-2  infection and should not be used as the sole basis for treatment or other  patient management decisions  Negative results must be combined with  clinical observations, patient history, and epidemiological information  This test has not been FDA cleared or approved  This test has been authorized by FDA under an Emergency Use Authorization  (EUA)   This test is only authorized for the duration of time the  declaration that circumstances exist justifying the authorization of the  emergency use of an in vitro diagnostic tests for detection of SARS-CoV-2  virus and/or diagnosis of COVID-19 infection under section 564(b)(1) of  the Act, 21 U  S C  536VKE-9(B)(5), unless the authorization is terminated  or revoked sooner  The test has been validated but independent review by FDA  and CLIA is pending  Test performed using [employer] GeneXpert: This RT-PCR assay targets N2,  a region unique to SARS-CoV-2  A conserved region in the E-gene was chosen  for pan-Sarbecovirus detection which includes SARS-CoV-2  RAPID DRUG SCREEN, URINE - Normal    Amph/Meth UR Negative  Negative Final    Barbiturate Ur Negative  Negative Final    Benzodiazepine Urine Negative  Negative Final    Cocaine Urine Negative  Negative Final    Methadone Urine Negative  Negative Final    Opiate Urine Negative  Negative Final    PCP Ur Negative  Negative Final    THC Urine Negative  Negative Final    Oxycodone Urine Negative  Negative Final    Narrative:     FOR MEDICAL PURPOSES ONLY  IF CONFIRMATION NEEDED PLEASE CONTACT THE LAB WITHIN 5 DAYS      Drug Screen Cutoff Levels:  AMPHETAMINE/METHAMPHETAMINES  1000 ng/mL  BARBITURATES     200 ng/mL  BENZODIAZEPINES     200 ng/mL  COCAINE      300 ng/mL  METHADONE      300 ng/mL  OPIATES      300 ng/mL  PHENCYCLIDINE     25 ng/mL  THC       50 ng/mL  OXYCODONE      100 ng/mL   MEDICAL ALCOHOL - Normal    Ethanol Lvl <10  <10 mg/dL Final   POCT PREGNANCY, URINE - Normal    EXT PREG TEST UR (Ref: Negative) Negative   Final    Control Valid   Final   TYPE AND SCREEN    ABO Grouping AB   Final    Rh Factor Positive   Final    Antibody Screen Negative   Final    Specimen Expiration Date [id number]   Final   PREPARE LEUKOREDUCED RBC    Unit Product Code [id number]       Unit Number [id number]       Unit ABO AB       Unit [facility] POS       Crossmatch Compatible   Final    Unit Dispense Status Presumed Trans       Unit Product Volume 350 mary PERRY RECHECK    ABO Grouping AB   Final    Rh Factor Positive   Final   IRON PANEL (INCLUDES FERRITIN,IRON SAT%, IRON, AND TIBC)    Narrative: The following orders were created for panel order Iron Panel (Includes Ferritin, Iron Sat%, Iron, and TIBC)  Procedure                               Abnormality         Status                     ---------                               -----------         ------                     Iron Saturation %[[id number]]            Abnormal            Final result               Ferritin[[id number]]                     Abnormal            Final result                 Please view results for these tests on the individual orders  Patient is medically cleared, to be transferred to [facility] for further evaluation and treatment  No acute events during shift  Patient is to be signed out to my colleague at change of shift, with plan for transfer  Procedures  MDM        Disposition  Final diagnoses:   Psychosis ([place] [address] )   Iron deficiency anemia due to chronic blood loss     Time reflects when diagnosis was documented in both MDM as applicable and the Disposition within this note     Time User Action Codes Description Comment    7/25/2022  2:56 PM Rupali Frazier Add [F29] Psychosis ([facility] )     7/25/2022  9:54 PM Noel Gerard Add [D50 0] Iron deficiency anemia due to chronic blood loss     7/27/2022  2:02 PM Desirae Daley Add [Z00 8] Medical clearance for psychiatric admission     7/27/2022  2:02 PM Desirae Daley Add [D21 9] Fibroids       ED Disposition     ED Disposition   Transfer to [address] [place] [address]   --    Date/Time   Wed Jul 27, 2022  7:32 PM    Comment   Trish Rodriguez should be transferred to [facility], and has been medically cleared             MD Documentation    Flowsheet Row Most Recent Value   Patient Condition The patient has been stabilized such that within reasonable medical probability, no material deterioration of the patient condition or the condition of the unborn child([place]) is likely to result from the transfer   Reason for Transfer Level of Care needed not available at this facility   Benefits of Transfer Specialized equipment and/or services available at the receiving facility (Include comment)________________________   Risks of Transfer Potential for delay in receiving treatment   Accepting Physician Rita Lundborg PA-C   [address] Name, Daniel RAMEY    (Name & Tel number) Debby Sotelo [phone]   Transported by (Company and Unit #) [facility]   Sending MD Dr Tad Gurrola   Provider Certification The patient is stable for psychiatric transfer because they are medically stable, and is protected from harming him/herself or others during transport      RN Documentation    [address]iel Name, Daniel RAMEY    (Name & Tel number) Debby Sotelo [phone]   Transported by (Company and Unit #) [facility]      Follow-up Information    None       Patient's Medications    No medications on file     No discharge procedures on file         ED Provider  Electronically Signed by     Aleksandr Hendrix MD  07/27/22 4370       Aleksandr Hendrix MD  07/28/22 0690

## 2022-07-27 NOTE — ED NOTES
Woke patient to obtain a m  bloodwork as ordered, patient refused bloodwork at this time and does not wish to have additional blood samples taken  Perfecto Monzon PA-C with [facility] notified; she stated to please communicate to next shift that patient will still need the bloodwork when she is willing  Additionally, bloodwork can be obtained less often if patient willing to receive IV Venofer infusions which she did not want to do yesterday       Jacobo Pacheco RN  07/27/22 0489

## 2022-07-28 ENCOUNTER — HOSPITAL ENCOUNTER (INPATIENT)
Facility: HOSPITAL | Age: 32
LOS: 8 days | Discharge: HOME | DRG: 751 | End: 2022-08-05
Attending: STUDENT IN AN ORGANIZED HEALTH CARE EDUCATION/TRAINING PROGRAM | Admitting: PSYCHIATRY & NEUROLOGY
Payer: COMMERCIAL

## 2022-07-28 VITALS
HEIGHT: 64 IN | BODY MASS INDEX: 21.34 KG/M2 | SYSTOLIC BLOOD PRESSURE: 100 MMHG | RESPIRATION RATE: 16 BRPM | DIASTOLIC BLOOD PRESSURE: 64 MMHG | HEART RATE: 61 BPM | OXYGEN SATURATION: 100 % | TEMPERATURE: 98.3 F | WEIGHT: 125 LBS

## 2022-07-28 DIAGNOSIS — Z00.8 MEDICAL CLEARANCE FOR PSYCHIATRIC ADMISSION: ICD-10-CM

## 2022-07-28 DIAGNOSIS — D50.0 IRON DEFICIENCY ANEMIA DUE TO CHRONIC BLOOD LOSS: ICD-10-CM

## 2022-07-28 DIAGNOSIS — F29 PSYCHOSIS (HCC): Primary | ICD-10-CM

## 2022-07-28 DIAGNOSIS — N28.9 DECREASED RENAL FUNCTION: ICD-10-CM

## 2022-07-28 DIAGNOSIS — D21.9 FIBROIDS: ICD-10-CM

## 2022-07-28 PROCEDURE — 93005 ELECTROCARDIOGRAM TRACING: CPT

## 2022-07-28 PROCEDURE — 99221 1ST HOSP IP/OBS SF/LOW 40: CPT | Performed by: PSYCHIATRY & NEUROLOGY

## 2022-07-28 RX ORDER — MAGNESIUM HYDROXIDE/ALUMINUM HYDROXICE/SIMETHICONE 120; 1200; 1200 MG/30ML; MG/30ML; MG/30ML
30 SUSPENSION ORAL EVERY 4 HOURS PRN
Status: DISCONTINUED | OUTPATIENT
Start: 2022-07-28 | End: 2022-08-05 | Stop reason: HOSPADM

## 2022-07-28 RX ORDER — FERROUS SULFATE 325(65) MG
325 TABLET ORAL
Status: DISCONTINUED | OUTPATIENT
Start: 2022-07-29 | End: 2022-08-05 | Stop reason: HOSPADM

## 2022-07-28 RX ORDER — TRAZODONE HYDROCHLORIDE 50 MG/1
50 TABLET ORAL
Status: DISCONTINUED | OUTPATIENT
Start: 2022-07-28 | End: 2022-08-05 | Stop reason: HOSPADM

## 2022-07-28 RX ORDER — BISACODYL 10 MG
10 SUPPOSITORY, RECTAL RECTAL DAILY PRN
Status: DISCONTINUED | OUTPATIENT
Start: 2022-07-28 | End: 2022-08-05 | Stop reason: HOSPADM

## 2022-07-28 RX ORDER — LORAZEPAM 2 MG/ML
2 INJECTION INTRAMUSCULAR
Status: DISCONTINUED | OUTPATIENT
Start: 2022-07-28 | End: 2022-08-05 | Stop reason: HOSPADM

## 2022-07-28 RX ORDER — BENZTROPINE MESYLATE 1 MG/ML
1 INJECTION INTRAMUSCULAR; INTRAVENOUS
Status: DISCONTINUED | OUTPATIENT
Start: 2022-07-28 | End: 2022-08-05 | Stop reason: HOSPADM

## 2022-07-28 RX ORDER — DOCUSATE SODIUM 100 MG/1
100 CAPSULE, LIQUID FILLED ORAL 2 TIMES DAILY
Status: DISCONTINUED | OUTPATIENT
Start: 2022-07-28 | End: 2022-08-05 | Stop reason: HOSPADM

## 2022-07-28 RX ORDER — BISACODYL 10 MG
10 SUPPOSITORY, RECTAL RECTAL DAILY PRN
Status: DISCONTINUED | OUTPATIENT
Start: 2022-07-28 | End: 2022-07-28

## 2022-07-28 RX ORDER — ACETAMINOPHEN 325 MG/1
650 TABLET ORAL EVERY 4 HOURS PRN
Status: DISCONTINUED | OUTPATIENT
Start: 2022-07-28 | End: 2022-08-05 | Stop reason: HOSPADM

## 2022-07-28 RX ORDER — HALOPERIDOL 1 MG/1
1 TABLET ORAL EVERY 6 HOURS PRN
Status: DISCONTINUED | OUTPATIENT
Start: 2022-07-28 | End: 2022-08-05 | Stop reason: HOSPADM

## 2022-07-28 RX ORDER — ACETAMINOPHEN 325 MG/1
650 TABLET ORAL EVERY 6 HOURS PRN
Status: DISCONTINUED | OUTPATIENT
Start: 2022-07-28 | End: 2022-08-05 | Stop reason: HOSPADM

## 2022-07-28 RX ORDER — POLYETHYLENE GLYCOL 3350 17 G/17G
17 POWDER, FOR SOLUTION ORAL DAILY PRN
Status: DISCONTINUED | OUTPATIENT
Start: 2022-07-28 | End: 2022-07-28

## 2022-07-28 RX ORDER — HALOPERIDOL 5 MG/ML
5 INJECTION INTRAMUSCULAR
Status: DISCONTINUED | OUTPATIENT
Start: 2022-07-28 | End: 2022-08-05 | Stop reason: HOSPADM

## 2022-07-28 RX ORDER — BENZTROPINE MESYLATE 1 MG/ML
0.5 INJECTION INTRAMUSCULAR; INTRAVENOUS
Status: DISCONTINUED | OUTPATIENT
Start: 2022-07-28 | End: 2022-08-05 | Stop reason: HOSPADM

## 2022-07-28 RX ORDER — POLYETHYLENE GLYCOL 3350 17 G/17G
17 POWDER, FOR SOLUTION ORAL DAILY PRN
Status: DISCONTINUED | OUTPATIENT
Start: 2022-07-28 | End: 2022-08-05 | Stop reason: HOSPADM

## 2022-07-28 RX ORDER — HALOPERIDOL 5 MG/ML
2.5 INJECTION INTRAMUSCULAR
Status: DISCONTINUED | OUTPATIENT
Start: 2022-07-28 | End: 2022-08-05 | Stop reason: HOSPADM

## 2022-07-28 RX ORDER — LORAZEPAM 2 MG/ML
1 INJECTION INTRAMUSCULAR EVERY 4 HOURS PRN
Status: DISCONTINUED | OUTPATIENT
Start: 2022-07-28 | End: 2022-08-05 | Stop reason: HOSPADM

## 2022-07-28 RX ORDER — ACETAMINOPHEN 325 MG/1
975 TABLET ORAL EVERY 6 HOURS PRN
Status: DISCONTINUED | OUTPATIENT
Start: 2022-07-28 | End: 2022-08-05 | Stop reason: HOSPADM

## 2022-07-28 RX ORDER — HALOPERIDOL 5 MG/1
5 TABLET ORAL
Status: DISCONTINUED | OUTPATIENT
Start: 2022-07-28 | End: 2022-08-05 | Stop reason: HOSPADM

## 2022-07-28 RX ORDER — LORAZEPAM 2 MG/ML
1 INJECTION INTRAMUSCULAR
Status: DISCONTINUED | OUTPATIENT
Start: 2022-07-28 | End: 2022-08-05 | Stop reason: HOSPADM

## 2022-07-28 RX ORDER — AMOXICILLIN 250 MG
1 CAPSULE ORAL DAILY PRN
Status: DISCONTINUED | OUTPATIENT
Start: 2022-07-28 | End: 2022-08-05 | Stop reason: HOSPADM

## 2022-07-28 RX ORDER — HYDROXYZINE 50 MG/1
50 TABLET, FILM COATED ORAL
Status: DISCONTINUED | OUTPATIENT
Start: 2022-07-28 | End: 2022-08-05 | Stop reason: HOSPADM

## 2022-07-28 RX ORDER — BENZTROPINE MESYLATE 1 MG/1
1 TABLET ORAL
Status: DISCONTINUED | OUTPATIENT
Start: 2022-07-28 | End: 2022-08-05 | Stop reason: HOSPADM

## 2022-07-28 RX ORDER — HALOPERIDOL 5 MG/1
2.5 TABLET ORAL
Status: DISCONTINUED | OUTPATIENT
Start: 2022-07-28 | End: 2022-08-05 | Stop reason: HOSPADM

## 2022-07-28 RX ORDER — LANOLIN ALCOHOL/MO/W.PET/CERES
3 CREAM (GRAM) TOPICAL
Status: DISCONTINUED | OUTPATIENT
Start: 2022-07-28 | End: 2022-08-05 | Stop reason: HOSPADM

## 2022-07-28 RX ORDER — OLANZAPINE 5 MG/1
2.5 TABLET, ORALLY DISINTEGRATING ORAL
Status: COMPLETED | OUTPATIENT
Start: 2022-07-28 | End: 2022-07-29

## 2022-07-28 RX ORDER — MINERAL OIL AND PETROLATUM 150; 830 MG/G; MG/G
1 OINTMENT OPHTHALMIC
Status: DISCONTINUED | OUTPATIENT
Start: 2022-07-28 | End: 2022-08-05 | Stop reason: HOSPADM

## 2022-07-28 RX ORDER — LORAZEPAM 1 MG/1
1 TABLET ORAL
Status: DISCONTINUED | OUTPATIENT
Start: 2022-07-28 | End: 2022-08-05 | Stop reason: HOSPADM

## 2022-07-28 RX ORDER — HYDROXYZINE HYDROCHLORIDE 25 MG/1
25 TABLET, FILM COATED ORAL
Status: DISCONTINUED | OUTPATIENT
Start: 2022-07-28 | End: 2022-08-05 | Stop reason: HOSPADM

## 2022-07-28 RX ORDER — SENNOSIDES 8.6 MG
2 TABLET ORAL
Status: DISCONTINUED | OUTPATIENT
Start: 2022-07-28 | End: 2022-08-02

## 2022-07-28 RX ADMIN — OLANZAPINE 2.5 MG: 5 TABLET, ORALLY DISINTEGRATING ORAL at 21:39

## 2022-07-28 RX ADMIN — SENNOSIDES 17.2 MG: 8.6 TABLET, FILM COATED ORAL at 21:40

## 2022-07-28 RX ADMIN — DOCUSATE SODIUM 100 MG: 100 CAPSULE, LIQUID FILLED ORAL at 07:46

## 2022-07-28 RX ADMIN — FERROUS SULFATE TAB 325 MG (65 MG ELEMENTAL FE) 325 MG: 325 (65 FE) TAB at 07:46

## 2022-07-28 RX ADMIN — Medication 3 MG: at 21:39

## 2022-07-28 NOTE — PLAN OF CARE
Problem: DISCHARGE PLANNING  Goal: Discharge to home or other facility with appropriate resources  Description: INTERVENTIONS:  - Identify barriers to discharge w/patient and caregiver  - Arrange for needed discharge resources and transportation as appropriate  - Identify discharge learning needs (meds, wound care, etc )  - Arrange for interpretive services to assist at discharge as needed  - Refer to Case Management Department for coordinating discharge planning if the patient needs post-hospital services based on physician/advanced practitioner order or complex needs related to functional status, cognitive ability, or social support system  Outcome: Progressing  Note: Pt met with CM to review & sign EMILIANO & complete intake

## 2022-07-28 NOTE — CMS CERTIFICATION NOTE
Recertification: Based upon physical, mental and social evaluations, I certify that inpatient psychiatric services continue to be medically necessary for this patient for a duration of 7 midnights for the treatment of  Psychosis [facility])   Available alternative community resources still do not meet the patient's mental health care needs  I further attest that an established written individualized plan of care has been updated and is outlined in the patient's medical records
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
## 2022-07-28 NOTE — TREATMENT PLAN
TREATMENT PLAN REVIEW - [facility] Mandy Jones 27 y o  1990 female MRN: [id number]    [address] [place] [address] Room / [facility]: [email or web address] 255-01 Encounter: [id number]          Admit Date/Time:  7/28/2022  8:58 AM    Treatment Team: Attending Provider: Berta Miller MD; Charge Nurse: Kamille Asher RN; : Abhijit Farmer; Patient Care Technician: Amelia Srivastava; Registered Nurse: Zo Valdez RN; Security: [employer]    Diagnosis: Principal Problem:    Psychosis [facility])      Patient Strengths/Assets: patient is on a voluntary commitment    Patient Barriers/Limitations: lack of social/family support, lack of stable employment    Short Term Goals: decrease in depressive symptoms, decrease in psychotic symptoms, decrease in suicidal thoughts    Long Term Goals: improvement in depression, resolution of psychotic sxs    Progress Towards Goals: continue opioid detoxification protocol with gradual taper    Recommended Treatment: medication management, patient medication education, group therapy, milieu therapy, continued Behavioral Health psychiatric evaluation/assessment process    Treatment Frequency: daily medication monitoring, group and milieu therapy daily, monitoring through interdisciplinary rounds, monitoring through weekly patient care conferences    Expected Discharge Date:  5 days    Discharge Plan: referrals as indicated    Treatment Plan Created/Updated By: Adam Dai MD
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
## 2022-07-28 NOTE — ED CARE HANDOFF
Emergency Department Sign Out Note        Sign out and transfer of care from Dr Joan Miller  See Separate Emergency Department note  The patient, Luciano Sykes, was evaluated by the previous provider for SI, hallucinations  Workup Completed:  Labs, crisis blood transfusion    ED Course / Workup Pending (followup): Patient was transition pending placement to [facility] later today  She is currently on a 201   Prior records indicate she has a history of fibroids and did require blood transfusion for anemia  Prior records indicate that she did not meet inpatient criteria  Procedures  MDM        Disposition  Final diagnoses:   [facility] ([facility] )   Iron deficiency anemia due to chronic blood loss     Time reflects when diagnosis was documented in both MDM as applicable and the Disposition within this note     Time User Action Codes Description Comment    7/25/2022  2:56 PM Lynford Dakins Add [F29] Psychosis ([facility] [address] )     7/25/2022  9:54 PM Jonn Culver Add [D50 0] Iron deficiency anemia due to chronic blood loss     7/27/2022  2:02 PM Kelli Greenfield Add [Z00 8] Medical clearance for psychiatric admission     7/27/2022  2:02 PM Kelli Greenfield Add [D21 9] Fibroids       ED Disposition     ED Disposition   Transfer to [address] [place] [address]   --    Date/Time   Wed Jul 27, 2022  7:32 PM    Comment   Luciano Sykes should be transferred to [address] [place] [address] Paulo , and has been medically cleared             MD Documentation    Cathy Joya Most Recent Value   Patient Condition The patient has been stabilized such that within reasonable medical probability, no material deterioration of the patient condition or the condition of the unborn child(winston) is likely to result from the transfer   Reason for Transfer Level of Care needed not available at this facility  [Inpatient psychiatric]   Benefits of Transfer Specialized equipment and/or services available at the receiving facility (Include comment)________________________  [facility]]   Risks of Transfer Potential for delay in receiving treatment   Accepting Physician Dr Homar Cotto Name, Bert RAMEY    (Name & Tel number) Kwasi Maloney [phone]   Transported by [employer] and Unit #) CTS   Sending MD Dr Moss More   Provider Certification General risk, such as traffic hazards, adverse weather conditions, rough terrain or turbulence, possible failure of equipment (including vehicle or aircraft), or consequences of actions of persons outside the control of the transport personnel      RN Documentation    [address]    (Name & Tel number) Kwasi Maloney [phone]   Transport Mode Car   Transported by [employer] and Unit #) CTS   Level of Care Basic life support   Transfer Date 07/28/22   Transfer Time 0800      Follow-up Information    None       Patient's Medications    No medications on file     No discharge procedures on file         ED Provider  Electronically Signed by     Haile Shea DO  07/28/22 8224

## 2022-07-28 NOTE — ED NOTES
Confirmed pick-up time of 0800 via CTS  Spoke with Nonnie Conception Dispatch    Notified Intake of same via TT

## 2022-07-28 NOTE — CASE MANAGEMENT
CM contacted [facility] LAILAHAYLEY @ [phone] & was informed that they last had contact with Pt in May, & she declined any supports/services at that time  CM completed a new intake & Pt will be contacted in 5-10 business days to schedule an assessment  CM contacted 2-1-1 @ [phone] & after holding, chose the option for a returned call
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
## 2022-07-28 NOTE — PLAN OF CARE
Problem: SELF HARM/SUICIDALITY  Goal: Will have no self-injury during hospital stay  Description: INTERVENTIONS:  - Q 15 MINUTES: Routine safety checks  - Q WAKING SHIFT & PRN: Assess risk to determine if routine checks are adequate to maintain patient safety  - Encourage patient to participate actively in care by formulating a plan to combat response to suicidal ideation, identify supports and resources  Outcome: Progressing     Problem: DEPRESSION  Goal: Will be euthymic at discharge  Description: INTERVENTIONS:  - Administer medication as ordered  - Provide emotional support via 1:1 interaction with staff  - Encourage involvement in milieu/groups/activities  - Monitor for social isolation  Outcome: Progressing     Problem: PSYCHOSIS  Goal: Will report no hallucinations or delusions  Description: Interventions:  - Administer medication as  ordered  - Every waking shifts and PRN assess for the presence of hallucinations and or delusions  - Assist with reality testing to support increasing orientation  - Assess if patient's hallucinations or delusions are encouraging self-harm or harm to others and intervene as appropriate  Outcome: Progressing     Problem: Ineffective Coping  Goal: Participates in unit activities  Description: Interventions:  - Provide therapeutic environment   - Provide required programming   - Redirect inappropriate behaviors   Outcome: Progressing

## 2022-07-29 PROBLEM — Z00.8 MEDICAL CLEARANCE FOR PSYCHIATRIC ADMISSION: Status: ACTIVE | Noted: 2022-07-29

## 2022-07-29 LAB
ATRIAL RATE: 78 BPM
P AXIS: 70 DEGREES
PR INTERVAL: 120 MS
QRS AXIS: 64 DEGREES
QRSD INTERVAL: 76 MS
QT INTERVAL: 338 MS
QTC INTERVAL: 385 MS
T WAVE AXIS: 56 DEGREES
VENTRICULAR RATE: 78 BPM

## 2022-07-29 PROCEDURE — 99232 SBSQ HOSP IP/OBS MODERATE 35: CPT | Performed by: PHYSICIAN ASSISTANT

## 2022-07-29 PROCEDURE — 99253 IP/OBS CNSLTJ NEW/EST LOW 45: CPT

## 2022-07-29 PROCEDURE — 93010 ELECTROCARDIOGRAM REPORT: CPT | Performed by: INTERNAL MEDICINE

## 2022-07-29 RX ORDER — OLANZAPINE 5 MG/1
5 TABLET, ORALLY DISINTEGRATING ORAL
Status: DISCONTINUED | OUTPATIENT
Start: 2022-07-30 | End: 2022-07-31

## 2022-07-29 RX ADMIN — Medication 325 MG: at 09:07

## 2022-07-29 RX ADMIN — Medication 3 MG: at 21:24

## 2022-07-29 RX ADMIN — SENNOSIDES 17.2 MG: 8.6 TABLET, FILM COATED ORAL at 21:24

## 2022-07-29 RX ADMIN — DOCUSATE SODIUM 100 MG: 100 CAPSULE, LIQUID FILLED ORAL at 09:07

## 2022-07-29 RX ADMIN — OLANZAPINE 2.5 MG: 5 TABLET, ORALLY DISINTEGRATING ORAL at 21:24

## 2022-07-29 RX ADMIN — DOCUSATE SODIUM 100 MG: 100 CAPSULE, LIQUID FILLED ORAL at 17:18

## 2022-07-29 NOTE — DISCHARGE INSTR - APPOINTMENTS
Jenise Lesch or Carleen, our Amrit and Marino, will be calling you after your discharge, on the phone number that you provided  They will be available as an additional support, if needed  If you wish to speak with one of them, you may contact Kirk Armijo at [phone] or Denisse Kong at [phone]  You are being discharged to the [place] [address], located at [address] [place] [address]  You provided cell phone number [phone] as the best way to contact you

## 2022-07-29 NOTE — DISCHARGE INSTR - OTHER ORDERS
You called 2-1-1 on 7/28/2022 & were given client ID: [id number]  You are scheduled to talk with [facility] on August 8(details provided directly to you by staff at the program)  You are being given a 30 day supply of your medications upon discharge  You declined a referral to the [facility] program, however, if you change your mind, you may contact them at [phone] opt 3 for  Heidi Escoto, who you spoke with  They are located at [address]  Crisis Services Crisis is not simply the moment when things become intolerable  Crises build over time, and often can be recognized and managed in advance  [address] provides not only immediate support for crisis situations, but also assistance with managing recurring or future crises  Support is available 24 hours a day, 7 days a week at [phone] ([phone])  This service is available to anyone in [facility], including children, teens, adults, and families  Stages of Crisis Management  Before a crisis  When you start to recognize the stressors that you or a loved one have felt during previous crises, please call [facility]'s peer support talk line at [phone]  It is available, free of charge, 7 days a week, 1:00pm to 9:00pm   [facility] also has a [employer] that can be reached by calling [phone] or texting [phone]  It is available Monday through Friday, 3:00pm to 9:00pm     During a crisis  When you or a loved one are experiencing a crisis, Mobile Crisis is available to help  Just call [phone])  The line is open 24 hours per day, 7 days per week  After a crisis  Mobile Crisis would like to help you develop ways to help reduce future crisis situations and create a crisis plan as part of your (or your child's, or your family's) recovery and wellness goals    Text CONNECT to 613265 from anywhere in the [place], anytime, about any type of crisis  A live, trained Crisis Counselor receives the text and lets you know that they are here to listen  The volunteer Crisis Counselor will help you move from a hot moment to a cool moment  [place] AND ALCOHOL TREATMENT  Treatment services are determined by the unique needs of each person  Initial contacts may be made through any of the Outpatient Treatment Facilities or Case Management Offices  Outpatient treatment is for persons with patterns of abuse or addiction who are seeking help  Individual,  group and family counseling is available through regularly  scheduled hourly appointments  [address] [place]  1579 [facility], 2[address] [facility] [phone]  [facility], 107 [facility], 100 [facility] [phone]  [facility], 88[facility]   [address] [place] [facility], 371 [facility] [phone]  [facility] MENTAL HE[facility] [place]  [address]  [facility], 100 [facility] [phone]  [facility]  114 [facility], [address] [facility] [address] [phone]    The [address] Alcohol  Information and Advocacy Service  [phone]  [address]  residents experiencing drug and  alcohol problems, or seeking help for a  family member, friend or colleague  [place] is a free, peer-led support group for adults living with mental illness  You will gain insight from hearing the challenges and successes of others, and the groups are led by JEMAL-trained facilitators who've been there  JEMAL's Support Groups are unique because they follow a structured model to ensure you and others in the group have an opportunity to be heard and to get what you need  By sharing your experiences in a safe and confidential setting, you gain hope and develop relationships   The group encourages empathy, productive discussion and a sense of community  Please note that all groups are currently held virtually, registration required: [email or web address]    When we are able to meet in-person, groups will meet at:    [facility]- 1st Monday of each month (except holidays) at 6:15 pm - 7:45 pm at the [facility], [address], [address]  [facility] - 1st Saturday of each month at 10am - 11:30am at the [facility] office, [address]  [address] [facility] 89 , Mike mclaughlin [facility] Saturday of each month at 10 am  - 11:30am at the [facility] office, [address]  [address]  20 [facility], Curtis Guzman 148  Support & Referral Helpline  Support and Referral Helpline is a 24-hour, 7 days-a-week, listening and referral service provided to all of [place]  Please call [phone] or [employer] [phone] to speak with one of our specialists  The helpline is possible through partnerships with the [employer] for [employer]  Crisis Intervention services are available 24 hours a day by calling [phone]  The crisis unit is located at [place] [address]  Services include telephone counseling, mobile crisis, walk-in crisis, and assistance in accessing inpatient care and short-term crisis residential services  The PEER LINE is a toll-free phone number for people in [facility] who are seeking a listening ear for additional support in their recovery from mental illness  The PEER LINE is peer-run and peer-friendly  Callers to the [address] will speak directly to other individuals who have experienced the mental health recovery process  Hours of operation: 8:30 am - 4:30 pm, Monday through Friday [phone])   Recovery Partnership  [address] [place] [address]   [address] [facility], [address]    Text CONNECT to 438457 from anywhere in the [place], anytime, about any type of crisis    A live, trained Crisis Counselor receives the text and lets you know that they are here to listen  The volunteer Crisis Counselor will help you move from a hot moment to a cool moment  Warm Line: [phone], [phone], [phone]  If it is not quite a crisis, but you want to talk to someone, 24 hours/day, 7 days/week:  Someone to listen; someone who cares  The [facility] on Mental Illness ([facility]) offers various education & support groups for you & your family  For more information visit their website at http://[employer]/     Dial 2-1-1 to get connected/get help  Free, confidential information & referral available 24/7: Aging Services, Child & Youth Services, Counseling, Education/Training, Food/Shelter/Clothing, Health Services, Parenting, Substance Abuse, Support Groups, Volunteer Opportunities, & much more  Phone: 2-1-1 or [phone], Web: Violeta Arzola, Email: August@[employer]    The [address] are open to all mental health consumers in [facility] who are interested in meeting people and making new friends  They provide a friendly social atmosphere with scheduled daily activities including games, arts & crafts, discussion and education groups, vocational activities, and much more  Light refreshments are served daily  The locations are:    [facility] - operated by [address] [place] [address]   Phone: [phone]   Fax: [phone]   E-mail: [email or web address]  Hours of Operation:  Monday 3:00 PM - 8:00 PM  Tuesday 12:00 PM - 8:00 PM   Wednesday 3:00 PM - 8:00 PM  Thursday 12:00 PM - 8:00 PM   Friday 3:00 PM - 8:00 PM   Saturday [facility] - operated by [place] [address]  Phone: [phone]  Fax: [phone]  Hours of Operation  Monday 12:00 PM - 8:00 PM  Tuesday 12:00 PM - 8:00 PM  Wednesday 12:00 PM - 8:00 PM   Thursday 12:00 PM - 8:00 PM  Friday 12:00 PM - 9:00 PM   Saturday 11:00 AM - 4:00 PM  Kelly Bacca transportation is available on scheduled days for transportation  Psych Rehab Program:  Modeled after the [address] [place] [address] program in [place], the CME offers consumers interested in fulfilling work a guaranteed place to come, to belong, and to enjoy meaningful relationships as they seek the confidence and skills necessary to lead vocationally productive and socially satisfying lives  Here is their contact information:  [address], [address] [place] [address]  Phone: [phone]  [employer]  This site is open Monday thru Thursday from 8:30 am till 4:00 pm and Fridays from 8:30 am till 3:00 pm  Consumers are encouraged to stop by for a visit  After-hour social activities are also scheduled  Support & Referral Helpline  Support and Referral Helpline is a 24-hour, 7 days-a-week, listening and referral service provided to all [address]  Please call [phone] or tty [phone] to speak with one of our specialists  The helpline is possible through partnerships with the [facility] [address] for [employer]

## 2022-07-29 NOTE — PLAN OF CARE
Problem: SELF HARM/SUICIDALITY  Goal: Will have no self-injury during hospital stay  Description: INTERVENTIONS:  - Q 15 MINUTES: Routine safety checks  - Q WAKING SHIFT & PRN: Assess risk to determine if routine checks are adequate to maintain patient safety  - Encourage patient to participate actively in care by formulating a plan to combat response to suicidal ideation, identify supports and resources  Outcome: Progressing     Problem: DEPRESSION  Goal: Will be euthymic at discharge  Description: INTERVENTIONS:  - Administer medication as ordered  - Provide emotional support via 1:1 interaction with staff  - Encourage involvement in milieu/groups/activities  - Monitor for social isolation  Outcome: Progressing     Problem: PSYCHOSIS  Goal: Will report no hallucinations or delusions  Description: Interventions:  - Administer medication as  ordered  - Every waking shifts and PRN assess for the presence of hallucinations and or delusions  - Assist with reality testing to support increasing orientation  - Assess if patient's hallucinations or delusions are encouraging self-harm or harm to others and intervene as appropriate  Outcome: Progressing     Problem: Ineffective Coping  Goal: Participates in unit activities  Description: Interventions:  - Provide therapeutic environment   - Provide required programming   - Redirect inappropriate behaviors   Outcome: Progressing     Problem: DISCHARGE PLANNING  Goal: Discharge to home or other facility with appropriate resources  Description: INTERVENTIONS:  - Identify barriers to discharge w/patient and caregiver  - Arrange for needed discharge resources and transportation as appropriate  - Identify discharge learning needs (meds, wound care, etc )  - Arrange for interpretive services to assist at discharge as needed  - Refer to Case Management Department for coordinating discharge planning if the patient needs post-hospital services based on physician/advanced practitioner order or complex needs related to functional status, cognitive ability, or social support system  Outcome: Progressing

## 2022-07-29 NOTE — ASSESSMENT & PLAN NOTE
 Admission labs pending   Vitals stable    UDS negative   COVID-19 negative   EKG reveals NSR,    Medically stable for continued inpatient psychiatric treatment based on available results

## 2022-07-29 NOTE — CONSULTS
[address] [facility] 1990, 27 y o  female MRN: [id number]  Unit/Bed#: Makenna Savage 255-01 Encounter: [id number]  Primary Care Provider: No primary care provider on file  Date and time admitted to hospital: 7/28/2022  8:58 AM    Inpatient consult for Medical Clearance for [facility] patient  Consult performed by: Cody Barrett PA-C  Consult ordered by: Max Irby PA-C          Medical clearance for psychiatric admission  Assessment & Plan   Admission labs pending   Vitals stable    UDS negative   COVID-19 negative   EKG reveals NSR,    Medically stable for continued inpatient psychiatric treatment based on available results      Anemia  Assessment & Plan  · Hgb in ED 6 8 requiring blood transfusion, increased to 7 9 on 7/27  Reports history of additional transfusion 1 year ago in [facility]  · Admission CBC pending   · Patient reports history of hospitalization several months ago due to need for blood transfusion in setting of ingrown fibroids  · Currently not on menstrual cycle (last 2-3 weeks ago), denies menorrhagia   · Denies active bleeding  · Symptoms: denies fatigue/weakness, dizziness, SOB, abdominal symptoms, or additional complaint  · Iron panel --> consistent with iron deficiency, continue PO iron supplements  · Will monitor daily H&H  · Transfusions needed to maintain H&H >7, if below 7 please transfer to ED    Fibroids  Assessment & Plan  · Patient reports history of fibroids diagnosed 1 year ago by provider in the [facility]  She has not had additional follow up since  · Will need outpatient gynecologic follow up    * Psychosis [facility])  Assessment & Plan  Treatment per psychiatry    Counseling / Coordination of Care Time: 30 minutes  Greater than 50% of total time spent on patient counseling and coordination of care  Collaboration of Care:  Were Recommendations Directly Discussed with Primary Treatment Team? - No     History of Present Illness:    [place] Osmin Cooper is a 27 y o  female who is originally admitted to the psychiatry service due to worsening anxiety/depression, AH, and SI  We are consulted for medical clearance for admission to [place] Unit and treatment of underlying psychiatric illness  Patient denies any chronic medical conditions or use of daily medications  Patient denies smoking, drinking or drug use  Available admission lab work and vitals are acceptable  Patient feels a baseline physical health, she denies symptoms of anemia including fatigue, SOB, lightheadedness, abdominal symptoms, or active bleeding  Patient appears medically stable for inpatient psychiatric treatment at this time  Please contact SLIM with any medical questions or concerns if issues should arise  Review of Systems:    Review of Systems   Constitutional: Negative for appetite change, chills, fatigue and fever  HENT: Negative for congestion, rhinorrhea and sore throat  Eyes: Negative for visual disturbance  Respiratory: Negative for cough, chest tightness, shortness of breath and wheezing  Cardiovascular: Negative for chest pain and palpitations  Gastrointestinal: Negative for abdominal pain, constipation, diarrhea, nausea and vomiting  Genitourinary: Negative for difficulty urinating, dysuria, frequency and urgency  Musculoskeletal: Negative for arthralgias, back pain and myalgias  Skin: Negative for rash and wound  Neurological: Negative for dizziness, light-headedness and headaches  All other systems reviewed and are negative  Past Medical and Surgical History:     Past Medical History:   Diagnosis Date    Anxiety     Depression     Fibroids 07/25/2022    Iron deficiency anemia due to chronic blood loss 07/25/2022    Sleep difficulties        No past surgical history on file  Meds/Allergies:    PTA meds:   None       Allergies:    Allergies   Allergen Reactions    Penicillins Other (See Comments)     Reaction Date: [id number]; Reaction Date: 21Jul2008;       Augmentin [Amoxicillin-Pot Clavulanate] Rash       Social History:     Marital Status: Single    Substance Use History:   Social History     Substance and Sexual Activity   Alcohol Use Yes    Comment: occassionally     Social History     Tobacco Use   Smoking Status Never Smoker   Smokeless Tobacco Never Used     Social History     Substance and Sexual Activity   Drug Use Never       Family History:    History reviewed  No pertinent family history  Physical Exam:     Vitals:   Blood Pressure: 109/65 (07/29/22 0729)  Pulse: 76 (07/29/22 0729)  Temperature: 97 7 °F (36 5 °C) (07/29/22 0729)  Temp Source: Tympanic (07/29/22 0729)  Respirations: 18 (07/29/22 0729)  Height: 5' 4" (162 6 cm) (07/28/22 3119)  Weight - Scale: 55 2 kg (121 lb 12 8 oz) (Waist 27") (07/28/22 0923)  SpO2: 99 % (07/29/22 0729)    Physical Exam  Vitals and nursing note reviewed  Constitutional:       General: She is not in acute distress  Appearance: She is not ill-appearing  HENT:      Head: Normocephalic and atraumatic  Nose: Nose normal    Eyes:      General:         Right eye: No discharge  Left eye: No discharge  Cardiovascular:      Rate and Rhythm: Normal rate and regular rhythm  Heart sounds: Normal heart sounds  No murmur heard  Pulmonary:      Effort: Pulmonary effort is normal  No respiratory distress  Breath sounds: Normal breath sounds  No wheezing, rhonchi or rales  Abdominal:      General: Bowel sounds are normal       Palpations: Abdomen is soft  Tenderness: There is no abdominal tenderness  There is no guarding  Musculoskeletal:      Right lower leg: No edema  Left lower leg: No edema  Skin:     General: Skin is warm and dry  Neurological:      Mental Status: She is alert and oriented to person, place, and time     Psychiatric:         Mood and Affect: Mood normal          Behavior: Behavior normal          Additional Data:     Lab Results: I have personally reviewed pertinent reports  Results from last 7 days   Lab Units 07/27/22  1012 07/26/22  0641 07/25/22  1422   WBC Thousand/uL 4 38  --  5 59   HEMOGLOBIN g/dL 7 9*   < > 6 8*   HEMATOCRIT % 28 8*   < > 26 0*   PLATELETS Thousands/uL 367  --  524*   NEUTROS PCT %  --   --  70   LYMPHS PCT %  --   --  19   MONOS PCT %  --   --  10   EOS PCT %  --   --  0    < > = values in this interval not displayed  No results found for: HGBA1C        EKG, Pathology, and Other Studies Reviewed on Admission:   · EKG NSR 78     ** Please Note: This note has been constructed using a voice recognition system   **

## 2022-07-29 NOTE — ASSESSMENT & PLAN NOTE
· Patient reports history of fibroids diagnosed 1 year ago by provider in the [facility]  She has not had additional follow up since    · Will need outpatient gynecologic follow up
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
## 2022-07-29 NOTE — TREATMENT TEAM
07/29/22 1000   Activity/Group Checklist   Group Community meeting   Attendance Attended   Attendance Duration (min) 16-30   Interactions Disorganized interaction   Affect/Mood Calm; Other (Comment)  (agitated)   Goals Achieved Able to engage in interactions     Pt filled out the goal card for the day, but when it was her turn to share her goals, she stated she does not believe in it, and that she lied on the card, asking "do you want me to lie to you," when this therapist said no, she said "then I shouldn't share this " She remained silent throughout the rest of the group, until she had a noticeable change in body language, to which a peer asked if she was okay  She spent the rest of the session complaining about her stay, making statements like "this is stupid, its all fluff, this isn't helpful, you're not helping, you're just here for a paycheck " She repeatedly stated she wanted to talk about the "deep stuff," and "what's the point of being here if we can't" she was explained by this therapist and other staff that this is an acute unit, made for safety stabilization so she can move onto her next step in treatment where she can discuss those past traumas  She would frequently make comments about how all the peers are saying this or that, but she was the only pt making those comments, and this is the first group since the unit opened  She is interested in knowing her D/C date

## 2022-07-29 NOTE — ASSESSMENT & PLAN NOTE
· Hgb in ED 6 8 requiring blood transfusion, increased to 7 9 on 7/27   Reports history of additional transfusion 1 year ago in [facility]  · Admission CBC pending   · Patient reports history of hospitalization several months ago due to need for blood transfusion in setting of ingrown fibroids  · Currently not on menstrual cycle (last 2-3 weeks ago), denies menorrhagia   · Denies active bleeding  · Symptoms: denies fatigue/weakness, dizziness, SOB, abdominal symptoms, or additional complaint  · Iron panel --> consistent with iron deficiency, continue PO iron supplements  · Will monitor daily H&H  · Transfusions needed to maintain H&H >7, if below 7 please transfer to ED

## 2022-07-29 NOTE — NURSING NOTE
Pt is visible on unit, social with peers  Denies questions or concerns at this time  Retrieved numbers from phone this evening  Currently watching movie

## 2022-07-29 NOTE — PROGRESS NOTES
Treatment Plan Meeting:  Diagnosis of Psychosis reviewed  Discussed short term goals for decrease in depressive symptoms, decrease in psychotic symptoms, & decrease in suicidal thoughts  Pt does not have insurance & was referred to [facility] outpatient  Pt did call 911 yesterday & was told she would be referred to [facility], [address]  Pt also is to have an intake with [facility] on August 8  At this time, no discharge date is set  All parties in agreement & treatment plan was signed       07/29/22 0815   Team Meeting   Meeting Type Tx Team Meeting   Initial Conference Date 07/29/22   Next Conference Date 08/26/22   Team Members Present   Team Members Present Physician;Nurse;   Physician Team Member Dr Didi Cline Team Member [address] Management Team Member [place]   Patient/Family Present   Patient Present No   Patient's Family Present No
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
## 2022-07-29 NOTE — NURSING NOTE
Pt resting in bed after group  Pt upset with group treatment and wants more in-depth care  Staff reinforced treatment/unit schedule  Writer offered pt to go to art therapy this afternoon and pt laughed, rolled eyes  OOB walking halls briefly  Pt is pleasant in conversation, reports some improvement in A/VH since yesterday  Scheduled Zyprexa reviewed with pt, as well as scheduled increase  Discussed coping skills, currently utilizing sound machine in room  Denies SI, HI  Briefly mentions home/family situation, able to move past quickly  No questions or concerns at this time
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
## 2022-07-30 PROCEDURE — 99232 SBSQ HOSP IP/OBS MODERATE 35: CPT | Performed by: PSYCHIATRY & NEUROLOGY

## 2022-07-30 RX ORDER — SIMETHICONE 80 MG
80 TABLET,CHEWABLE ORAL EVERY 6 HOURS PRN
Status: DISCONTINUED | OUTPATIENT
Start: 2022-07-30 | End: 2022-08-05 | Stop reason: HOSPADM

## 2022-07-30 RX ADMIN — DOCUSATE SODIUM 100 MG: 100 CAPSULE, LIQUID FILLED ORAL at 09:04

## 2022-07-30 RX ADMIN — SENNOSIDES 17.2 MG: 8.6 TABLET, FILM COATED ORAL at 21:44

## 2022-07-30 RX ADMIN — DOCUSATE SODIUM 100 MG: 100 CAPSULE, LIQUID FILLED ORAL at 17:28

## 2022-07-30 RX ADMIN — Medication 3 MG: at 21:44

## 2022-07-30 RX ADMIN — BENZTROPINE MESYLATE 1 MG: 1 TABLET ORAL at 13:02

## 2022-07-30 RX ADMIN — HALOPERIDOL 2.5 MG: 5 TABLET ORAL at 13:03

## 2022-07-30 RX ADMIN — OLANZAPINE 5 MG: 5 TABLET, ORALLY DISINTEGRATING ORAL at 21:44

## 2022-07-30 RX ADMIN — Medication 325 MG: at 09:04

## 2022-07-30 NOTE — NURSING NOTE
BHT attempted to talk with pt to inquire about collecting CBC to be able to check hemoglobin levels  Pt declined, asking to talk with medical doctor about why it was necessary  Writer spoke with pt at length to encourage the CBC to monitor hemoglobin  Pt continues to decline labs and states they will continue to take oral iron supplements and choose iron-rich foods but does not want to have labs drawn due to complaints of IV sites from ED  SLIM notified

## 2022-07-30 NOTE — NURSING NOTE
Pt reports worsening of AH, states they are conspiratorial and evil  Denies VH  Discussed a need for PRN medications and pt received Haldol 2 5mg and Cogentin 1mg po at 1303

## 2022-07-30 NOTE — NURSING NOTE
Pt lying in bed following breakfast  Encouraged OOB to receive morning medications  Pt is bright, pleasant in conversation  Reports continued improvement with AH, denies VH at this time  Hopeful that increase in Zyprexa will be helpful  Discussed multiple coping strategies for while IP and once discharged to community  Pt receptive

## 2022-07-30 NOTE — TREATMENT TEAM
07/30/22 0900   Team Meeting   Meeting Type Daily Rounds   Team Members Present   Team Members Present Physician;Nurse   Physician Team Member Dr Jessica Hamm Team Member Miya Arreguin   Patient/Family Present   Patient Present No   Patient's Family Present No       AM rounds- Denies SI/HI, reports feeling medications are helpful  Social on unit with peers  Appears to be less anxious  Slept well overnight

## 2022-07-30 NOTE — NURSING NOTE
Pt refused labs  Pt states she has pain in right arm from previous IV sight and is unwilling to allow blood draw in fear of her arm hurting worse or the unaffected arm beginning to hurt

## 2022-07-30 NOTE — PROGRESS NOTES
Progress Note - Behavioral Health   Annette Enrique 27 y o  female MRN: [id number]  Unit/Bed#: [facility] 255-01 Encounter: [id number]    Assessment/Plan   Principal Problem:    Psychosis [facility])  Active Problems:    Fibroids    Anemia    Medical clearance for psychiatric admission  Patient was interviewed in this provider's office and was pleasant and cooperative though did laugh inappropriately and appear internally preoccupied  She was able to answer line of questioning appropriately and logically  Does have Yazdanism preoccupation and did display some hand wringing throughout the conversation  Did not endorse any safety concerning symptoms  Did admit to both auditory and visual hallucinations of visions of people dying and hearing demons, respectively  Does state that both of these symptoms have decreased in severity with Zyprexa which has additionally improved her sleep and appetite  Could consider further increases in the coming days if her symptoms remain refractory but will continue for patient's psychosis at this time  Does report having flatulence and requests medication help for this in addition to medication for soreness of her right forearm for which he cannot identify a source and noticeable rash or abrasion is present  Will provide patient with heat packs for right forearm and reassess moving forward  Will continue melatonin for sleeping difficulties as well  Recommended Treatment:   1) Continue Zyprexa 5 mg PO QHS for psychosis  Consider further increases in coming days  2) Ordered simethicone for flatulence  3) Ordered heat pack for sore R forearm  4) Continue Melatonin 3 mg PO QHS for sleeping difficulties    Continue with group therapy, milieu therapy and occupational therapy  Continue frequent safety checks and vitals per unit protocol    Case discussed with treatment team   Risks, benefits and possible side effects of Medications: Risks, benefits, and possible side effects of medications have been explained to the patient, who verbalizes understanding    ------------------------------------------------------------    Subjective: Per nursing report, [place] Lucio has been cooperative on the unit and compliant with medications  Today, [place] Lucio is consenting for safety on the unit  She reports that her current mood is concerned about my circumstances   further inquiry reveals that she is concerned about her community and [place] to be alive   does report that she is happy to be alive but has had concerning visions and hears voices  States that she often has visions of people dying and hears demons that are talking in sentences since January of this year  Reports that both of these symptoms are significantly decreased since starting Zyprexa yesterday  Reports that anxiety is under better control and denies any depressive symptoms  Does report that her right arm is sore but cannot identify a source for this but was agreeable to getting a heat pack  Also reports flatulence and ask for medication help for this  Reports that sleep and appetite are both improved on medication as well  Denies any side effects to medications  Per nursing staff, patient has been very religiously preoccupied and has been anemic with daily lab draws for monitoring of this  Preoccupied and staring  Anxious but cooperative and pleasant on approach  Progress Toward Goals: slow improvement    Psychiatric Review of Systems:  Behavior over the last 24 hours: slow improvement  Sleep: improving  Appetite: improving  Medication side effects: none verbalized  ROS: Complete review of systems is negative except as noted above      Vital signs in last 24 hours:  Temp:  [98 4 °F (36 9 °C)-99 4 °F (37 4 °C)] 98 4 °F (36 9 °C)  HR:  [71-93] 71  Resp:  [17-18] 18  BP: ()/(51-62) 95/51    Mental Status Exam:  Appearance:  alert, intermittant eye contact, appears younger than stated age, casually dressed, appropriate grooming and hygiene, thin, smiling and good dental hygeine   Behavior:  calm, cooperative, sitting comfortably and laughing inappropriately at times   Motor: psychomotor agitation, normal gait and balance and hand wringing off and on during interview   Speech:  spontaneous, normal rate, normal volume and coherent   Mood:  "Concerned about my circumstances"   Affect:  mood-congruent, constricted, anxious, brighter than previous and unsure   Thought Process:  Organized, logical, goal-directed   Thought Content: mild paranoia, intrusive thoughts, Pentecostalism preoccupation   Perceptual disturbances: auditory hallucinations "demons", visual hallucinations "people dying" and appears to be responding to internal stimuli   Risk Potential: No active or passive suicidal or homicidal ideation was verbalized during interview, Low potential for aggression based on previous behavior   Cognition: oriented to self and situation, memory impaired due to active psychosis, appears to be of average intelligence, impaired abstract reasoning, attention span appeared shorter than expected for age and cognition not formally tested   Insight:  Limited   Judgment: Limited     Current Medications:  Current Facility-Administered Medications   Medication Dose Route Frequency Provider Last Rate    acetaminophen  650 mg Oral Q6H PRN Ragena Jacquie, PA-C      acetaminophen  650 mg Oral Q4H PRN Ragena Jacquie, PA-C      acetaminophen  975 mg Oral Q6H PRN Ragena Jacquie, PA-C      aluminum-magnesium hydroxide-simethicone  30 mL Oral Q4H PRN Ragena Jacquie, PA-C      artificial tear  1 application Both Eyes L4B PRN Ragena Jacquie, PA-C      haloperidol lactate  2 5 mg Intramuscular Q4H PRN Max 4/day Ragena Jacquie, PA-C      And    LORazepam  1 mg Intramuscular Q4H PRN Max 4/day Ragena Jacquie, PA-C      And    benztropine  0 5 mg Intramuscular Q4H PRN Max 4/day Ragena Jacquie, PA-C      haloperidol lactate  5 mg Intramuscular Q4H PRN Max 4/day Modesta Fear, PA-C      And    LORazepam  2 mg Intramuscular Q4H PRN Max 4/day Modesta Fear, PA-C      And    benztropine  1 mg Intramuscular Q4H PRN Max 4/day Modesta Fear, PA-C      benztropine  1 mg Intramuscular Q4H PRN Max 6/day Modesta Fear, PA-C      benztropine  1 mg Oral Q4H PRN Max 6/day Modesta Fear, PA-C      bisacodyl  10 mg Rectal Daily PRN Carole Rod MD      docusate sodium  100 mg Oral BID Modesta Fear, PA-C      ferrous sulfate  325 mg Oral Daily With Breakfast Modesta Fear, PA-C      haloperidol  1 mg Oral Q6H PRN Modesta Fear, PA-C      haloperidol  2 5 mg Oral Q4H PRN Max 4/day Modesta Fear, PA-C      haloperidol  5 mg Oral Q4H PRN Max 4/day Modesta Fear, PA-C      hydrOXYzine HCL  25 mg Oral Q6H PRN Max 4/day Modesta Fear, PA-C      hydrOXYzine HCL  50 mg Oral Q4H PRN Max 4/day Modesta Fear, PA-C      Or    LORazepam  1 mg Intramuscular Q4H PRN Modesta Fear, PA-C      LORazepam  1 mg Oral Q4H PRN Max 6/day Modesta Fear, PA-C      Or    LORazepam  2 mg Intramuscular Q6H PRN Max 3/day Modesta Fear, PA-C      melatonin  3 mg Oral HS Modesta Fear, PA-C      OLANZapine  5 mg Oral HS Modesta Fear, PA-C      polyethylene glycol  17 g Oral Daily PRN Carole Rod MD      senna  2 tablet Oral HS Modesta Fear, PA-C      senna-docusate sodium  1 tablet Oral Daily PRN Modesta Fear, PA-C      simethicone  80 mg Oral Q6H PRN Blaiseonda Jesus,       traZODone  50 mg Oral HS PRN Modesta Fear, PA-C         Behavioral Health Medications: all current active meds have been reviewed  Changes as in plan section above  Laboratory results:  I have personally reviewed all pertinent laboratory/tests results    Recent Results (from the past 48 hour(s))   ECG 12 lead    Collection Time: 07/28/22  6:06 PM   Result Value Ref Range    Ventricular Rate 78 BPM    Atrial Rate 78 BPM    NM Interval 120 ms    QRSD Interval 76 ms    QT Interval 338 ms    QTC Interval 385 ms    P Axis 70 degrees    QRS Axis 64 degrees    T Wave Axis 56 degrees        Chester Lopez DO

## 2022-07-30 NOTE — NURSING NOTE
Pt appears anxious during conversation, bright in conversation, smiling  Reports PRN Haldol to be effective "I think it helped" reports current decrease in AH  VH are intermittent   Encouraged to drink fluids prior to lab draw, explained importance of monitoring hemoglobin due to recent infusion, pt verbalizes understanding but "paranoid that my other arm will hurt too " Discussed iron-rich foods to order at meal times

## 2022-07-31 PROCEDURE — 99232 SBSQ HOSP IP/OBS MODERATE 35: CPT | Performed by: PSYCHIATRY & NEUROLOGY

## 2022-07-31 RX ORDER — OLANZAPINE 5 MG/1
7.5 TABLET, ORALLY DISINTEGRATING ORAL
Status: DISCONTINUED | OUTPATIENT
Start: 2022-07-31 | End: 2022-08-01

## 2022-07-31 RX ADMIN — ACETAMINOPHEN 650 MG: 325 TABLET ORAL at 13:58

## 2022-07-31 RX ADMIN — DOCUSATE SODIUM 100 MG: 100 CAPSULE, LIQUID FILLED ORAL at 09:15

## 2022-07-31 RX ADMIN — OLANZAPINE 7.5 MG: 5 TABLET, ORALLY DISINTEGRATING ORAL at 22:11

## 2022-07-31 RX ADMIN — DOCUSATE SODIUM 100 MG: 100 CAPSULE, LIQUID FILLED ORAL at 17:08

## 2022-07-31 RX ADMIN — SENNOSIDES 17.2 MG: 8.6 TABLET, FILM COATED ORAL at 22:11

## 2022-07-31 RX ADMIN — Medication 3 MG: at 22:11

## 2022-07-31 RX ADMIN — Medication 325 MG: at 09:15

## 2022-07-31 NOTE — NURSING NOTE
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

## 2022-07-31 NOTE — PROGRESS NOTES
Progress Note - Behavioral Health   Nohelia Durant 27 y o  female MRN: [id number]  Unit/Bed#: [facility] 255-01 Encounter: [id number]    Assessment/Plan   Principal Problem:    Psychosis [facility])  Active Problems:    Fibroids    Anemia    Medical clearance for psychiatric admission  Patient is seen in this provider's office and appeared pleasant, calm, and cooperative until refusal of morning lab was discussed  Patient did allow an attempt but became frustrated when the tech was unable to acquire blood and states that it is unreasonable to make her do this several times  Was agreeable to rescheduling morning labs until tomorrow to give her arm a break but did understand why these labs were necessary and that Internal Medicine will want to discuss this with her further as well  Could consider use of vein imaging if available later on  Patient does have improvement of hallucinogenic material and states that she is hearing demon voices less intensely and less frequently than yesterday and reports complete resolution of her previously stated visions    Patient did have as needed Haldol yesterday which does seem to have benefited patient  Will make increased to standing dose of Zyprexa for psychosis  Overall, patient is improving  Denies flatulence today and reports that right arm heat pack was effective which will both be continued  Sleep is under better control on melatonin which will be continued without change  Denies any safety concerning symptoms  Continues to appear to be responding to internal stimuli with some noted internal preoccupation at times though improved since yesterday  Will continue to monitor  Recommended Treatment:   1) Increase Zyprexa to 7 5 mg PO QHS for psychosis  2) Continue simethicone for flatulence  3) Continue heat pack for sore R forearm  4) Continue Melatonin 3 mg PO QHS for sleeping difficulties  5) Education provided about need for labs   Patient was agreeable to retrial of lab draw tomorrow morning  Could consider use of vein imaging if continues to be unsuccessful  IM to discuss this with patient as well  Continue with group therapy, milieu therapy and occupational therapy  Continue frequent safety checks and vitals per unit protocol  Case discussed with treatment team   Risks, benefits and possible side effects of Medications: Risks, benefits, and possible side effects of medications have been explained to the patient, who verbalizes understanding    ------------------------------------------------------------    Subjective: Per nursing report, Óscar Colón has been cooperative on the unit and compliant with medications  Today, Óscar Colón is consenting for safety on the unit  She reports that her current mood is all right   patient reports complete resolution of visual hallucinations of visions yesterday and indicates that auditory hallucinations have become less frequent and less severe as she is no longer hearing the [facility] as frequently as she did  Reports that she is happy with her medication regimen and denies any side effects  Indicates that she was able to have bowel movement on standing dose of stool softener and that on pain is improved with heat pad  Patient did become slightly more agitated when discussing lab draws and but was overall agreeable to having a retrial tomorrow and consideration of imaging if available and appropriate later next week if blood continues to not be able to be drawn  Patient did receive Haldol as needed yesterday and was agreeable to increasing her antipsychotic dosage  Denied SI/HI  Reports that she is eating and sleeping without difficulty  Per nursing staff, patient did refuse labs this morning stating I do not want my arms messed up  [facility]  Internal medicine discuss this with patient later today      Progress Toward Goals: moderate improvement    Psychiatric Review of Systems:  Behavior over the last 24 hours: improved  Sleep: normal  Appetite: adequate  Medication side effects: none verbalized  ROS: sore arm from lab draw attempts    Vital signs in last 24 hours:  Temp:  [97 4 °F (36 3 °C)-98 4 °F (36 9 °C)] 97 4 °F (36 3 °C)  HR:  [70-76] 70  Resp:  [17-18] 17  BP: (107-110)/(66-75) 107/66    Mental Status Exam:  Appearance:  alert, good eye contact, appears younger than stated age, casually dressed, thin, smiling and good dental hygiene   Behavior:  cooperative and more agitated throughout conversation of blood draw   Motor: no abnormal movements and normal gait and balance   Speech:  spontaneous, increased rate, normal volume and coherent   Mood:  "alright"   Affect:  mood-congruent, constricted, anxious and brighter than previous   Thought Process:  Organized, logical, goal-directed   Thought Content: mild paranoia, [facility] preoccupation   Perceptual disturbances: auditory hallucinations "demons" less frequent and intense today, visual hallucinations resolved and appears to be responding to internal stimuli   Risk Potential: No active or passive suicidal or homicidal ideation was verbalized during interview, Low potential for aggression based on previous behavior   Cognition: oriented to self and situation, memory grossly intact, appears to be of average intelligence, impaired abstract reasoning, attention span appeared shorter than expected for age and cognition not formally tested   Insight:  Improving   Judgment: Improving     Current Medications:  Current Facility-Administered Medications   Medication Dose Route Frequency Provider Last Rate    acetaminophen  650 mg Oral Q6H PRN Melodye Silvan, PA-C      acetaminophen  650 mg Oral Q4H PRN Melodye Silvan, PA-C      acetaminophen  975 mg Oral Q6H PRN Melodye Silvan, PA-C      aluminum-magnesium hydroxide-simethicone  30 mL Oral Q4H PRN Melodye Silvan, PA-C      artificial tear  1 application Both Eyes W7R PRN Melodye Silvan, PA-C      haloperidol lactate  2 5 mg Intramuscular Q4H PRN Max 4/day Homar Socks, PA-C      And    LORazepam  1 mg Intramuscular Q4H PRN Max 4/day Homar Socks, PA-C      And    benztropine  0 5 mg Intramuscular Q4H PRN Max 4/day Homar Socks, PA-C      haloperidol lactate  5 mg Intramuscular Q4H PRN Max 4/day Homar Socks, PA-C      And    LORazepam  2 mg Intramuscular Q4H PRN Max 4/day Homar Socks, PA-C      And    benztropine  1 mg Intramuscular Q4H PRN Max 4/day Homar Socks, PA-C      benztropine  1 mg Intramuscular Q4H PRN Max 6/day Homar Socks, PA-C      benztropine  1 mg Oral Q4H PRN Max 6/day Homar Socks, PA-C      bisacodyl  10 mg Rectal Daily PRN Augiemegan Clemons, MD      docusate sodium  100 mg Oral BID Homar Socks, PA-DIANA      ferrous sulfate  325 mg Oral Daily With Breakfast Homar Socks, PA-C      haloperidol  1 mg Oral Q6H PRN Homar Socks, PA-C      haloperidol  2 5 mg Oral Q4H PRN Max 4/day Homar Socks, PA-C      haloperidol  5 mg Oral Q4H PRN Max 4/day Homar Socks, PA-C      hydrOXYzine HCL  25 mg Oral Q6H PRN Max 4/day Homar Socks, PA-C      hydrOXYzine HCL  50 mg Oral Q4H PRN Max 4/day Homar Socks, PA-C      Or    LORazepam  1 mg Intramuscular Q4H PRN Homar Socks, PA-C      LORazepam  1 mg Oral Q4H PRN Max 6/day Homar Socks, PA-C      Or    LORazepam  2 mg Intramuscular Q6H PRN Max 3/day Homar Socks, PA-DIANA      melatonin  3 mg Oral HS Homar Socks, PA-DIANA      OLANZapine  7 5 mg Oral HS Sunshine Arms, DO      polyethylene glycol  17 g Oral Daily PRN Augie Esters, MD      senna  2 tablet Oral HS Homar Socks, ANDRES      senna-docusate sodium  1 tablet Oral Daily PRN Homar Socks, PA-DIANA      simethicone  80 mg Oral Q6H PRN Sunshine Arms, DO      traZODone  50 mg Oral HS PRN Homar Socks, PA-DIANA         Behavioral Health Medications: all current active meds have been reviewed  Changes as in plan section above      Laboratory results:  I have personally reviewed all pertinent laboratory/tests results  No results found for this or any previous visit (from the past 48 hour(s))       Chester Lopez DO

## 2022-07-31 NOTE — NURSING NOTE
During [place] [id number] group this evening, patient stated that her favorite thing to do is travel  Her goal is to visit [facility] some day  Patient stated she needs to find housing before discharge  Writer explained to all in attendance that CM will assist with suggestions to call for housing, and patients will not be discharged to the street

## 2022-07-31 NOTE — TREATMENT TEAM
07/31/22 0800   Team Meeting   Meeting Type Daily Rounds   Team Members Present   Team Members Present Physician;Nurse   Physician Team Member Dr Eleazar Wynne Team Member Naiad Sands   Patient/Family Present   Patient Present No   Patient's Family Present No     AM rounds- denies SI/HI, received haldol and cogentin prn for hallucinations of demons  Refused labs stating her arms were sore from lab draws  Pt social at times with peers  Slept well

## 2022-07-31 NOTE — NURSING NOTE
Pt awake, pleasant  Reports good sleep overnight, waking once because they were cold, able to return to sleep  Provided with another blanket this morning  Pt feels the increase in Zyprexa continues to help, "improving a little more each day " Has intermittent AVH, not bothersome at this time  Encouraged to shower this morning

## 2022-07-31 NOTE — NURSING NOTE
Larry Sharmaine appears to have slept overnight, without interruption or periods of restlessness observed  Patient appears to still be sleeping now  Q7 minute safety checks to continue to promote patient safety

## 2022-08-01 ENCOUNTER — HOSPITAL ENCOUNTER (EMERGENCY)
Facility: HOSPITAL | Age: 32
End: 2022-08-01
Attending: EMERGENCY MEDICINE | Admitting: EMERGENCY MEDICINE

## 2022-08-01 VITALS
WEIGHT: 125 LBS | DIASTOLIC BLOOD PRESSURE: 74 MMHG | BODY MASS INDEX: 21.34 KG/M2 | TEMPERATURE: 98 F | HEIGHT: 64 IN | RESPIRATION RATE: 16 BRPM | OXYGEN SATURATION: 100 % | SYSTOLIC BLOOD PRESSURE: 120 MMHG | HEART RATE: 66 BPM

## 2022-08-01 DIAGNOSIS — R79.9 ELEVATED BUN: Primary | ICD-10-CM

## 2022-08-01 DIAGNOSIS — R79.89 ELEVATED SERUM CREATININE: ICD-10-CM

## 2022-08-01 LAB
ALBUMIN SERPL BCP-MCNC: 3.5 G/DL (ref 3.5–5)
ALP SERPL-CCNC: 46 U/L (ref 46–116)
ALT SERPL W P-5'-P-CCNC: 14 U/L (ref 12–78)
ANION GAP SERPL CALCULATED.3IONS-SCNC: 9 MMOL/L (ref 4–13)
AST SERPL W P-5'-P-CCNC: 29 U/L (ref 5–45)
BACTERIA UR QL AUTO: ABNORMAL /HPF
BASOPHILS # BLD AUTO: 0.09 THOUSANDS/ΜL (ref 0–0.1)
BASOPHILS NFR BLD AUTO: 2 % (ref 0–1)
BILIRUB SERPL-MCNC: 0.3 MG/DL (ref 0.2–1)
BILIRUB UR QL STRIP: NEGATIVE
BUN SERPL-MCNC: 30 MG/DL (ref 5–25)
CALCIUM SERPL-MCNC: 8.9 MG/DL (ref 8.3–10.1)
CHLORIDE SERPL-SCNC: 105 MMOL/L (ref 96–108)
CHOLEST SERPL-MCNC: 156 MG/DL
CLARITY UR: ABNORMAL
CO2 SERPL-SCNC: 26 MMOL/L (ref 21–32)
COLOR UR: ABNORMAL
CREAT SERPL-MCNC: 1.85 MG/DL (ref 0.6–1.3)
EOSINOPHIL # BLD AUTO: 0.05 THOUSAND/ΜL (ref 0–0.61)
EOSINOPHIL NFR BLD AUTO: 1 % (ref 0–6)
ERYTHROCYTE [DISTWIDTH] IN BLOOD BY AUTOMATED COUNT: 32.5 % (ref 11.6–15.1)
GFR SERPL CREATININE-BSD FRML MDRD: 35 ML/MIN/1.73SQ M
GLUCOSE P FAST SERPL-MCNC: 71 MG/DL (ref 65–99)
GLUCOSE SERPL-MCNC: 71 MG/DL (ref 65–140)
GLUCOSE UR STRIP-MCNC: NEGATIVE MG/DL
HCT VFR BLD AUTO: 31.3 % (ref 34.8–46.1)
HDLC SERPL-MCNC: 86 MG/DL
HGB BLD-MCNC: 8.4 G/DL (ref 11.5–15.4)
HGB UR QL STRIP.AUTO: ABNORMAL
IMM GRANULOCYTES # BLD AUTO: 0.01 THOUSAND/UL (ref 0–0.2)
IMM GRANULOCYTES NFR BLD AUTO: 0 % (ref 0–2)
KETONES UR STRIP-MCNC: NEGATIVE MG/DL
LDLC SERPL CALC-MCNC: 65 MG/DL (ref 0–100)
LEUKOCYTE ESTERASE UR QL STRIP: ABNORMAL
LYMPHOCYTES # BLD AUTO: 1.7 THOUSANDS/ΜL (ref 0.6–4.47)
LYMPHOCYTES NFR BLD AUTO: 37 % (ref 14–44)
MCH RBC QN AUTO: 19.3 PG (ref 26.8–34.3)
MCHC RBC AUTO-ENTMCNC: 26.8 G/DL (ref 31.4–37.4)
MCV RBC AUTO: 72 FL (ref 82–98)
MONOCYTES # BLD AUTO: 0.58 THOUSAND/ΜL (ref 0.17–1.22)
MONOCYTES NFR BLD AUTO: 13 % (ref 4–12)
MUCOUS THREADS UR QL AUTO: ABNORMAL
NEUTROPHILS # BLD AUTO: 2.21 THOUSANDS/ΜL (ref 1.85–7.62)
NEUTS SEG NFR BLD AUTO: 47 % (ref 43–75)
NITRITE UR QL STRIP: NEGATIVE
NON-SQ EPI CELLS URNS QL MICRO: ABNORMAL /HPF
NONHDLC SERPL-MCNC: 70 MG/DL
NRBC BLD AUTO-RTO: 0 /100 WBCS
PH UR STRIP.AUTO: 6 [PH]
PLATELET # BLD AUTO: 195 THOUSANDS/UL (ref 149–390)
POTASSIUM SERPL-SCNC: 4.7 MMOL/L (ref 3.5–5.3)
PROT SERPL-MCNC: 6.8 G/DL (ref 6.4–8.4)
PROT UR STRIP-MCNC: ABNORMAL MG/DL
RBC # BLD AUTO: 4.35 MILLION/UL (ref 3.81–5.12)
RBC #/AREA URNS AUTO: ABNORMAL /HPF
RPR SER QL: NORMAL
SODIUM SERPL-SCNC: 140 MMOL/L (ref 135–147)
SP GR UR STRIP.AUTO: 1.01 (ref 1–1.03)
TRIGL SERPL-MCNC: 26 MG/DL
TSH SERPL DL<=0.05 MIU/L-ACNC: 1.76 UIU/ML (ref 0.45–4.5)
UROBILINOGEN UR QL STRIP.AUTO: 0.2 E.U./DL
VIT B12 SERPL-MCNC: 698 PG/ML (ref 100–900)
WBC # BLD AUTO: 4.64 THOUSAND/UL (ref 4.31–10.16)
WBC #/AREA URNS AUTO: ABNORMAL /HPF

## 2022-08-01 PROCEDURE — 82746 ASSAY OF FOLIC ACID SERUM: CPT | Performed by: PSYCHIATRY & NEUROLOGY

## 2022-08-01 PROCEDURE — 84443 ASSAY THYROID STIM HORMONE: CPT | Performed by: PSYCHIATRY & NEUROLOGY

## 2022-08-01 PROCEDURE — 99282 EMERGENCY DEPT VISIT SF MDM: CPT

## 2022-08-01 PROCEDURE — 83036 HEMOGLOBIN GLYCOSYLATED A1C: CPT | Performed by: PSYCHIATRY & NEUROLOGY

## 2022-08-01 PROCEDURE — 96360 HYDRATION IV INFUSION INIT: CPT

## 2022-08-01 PROCEDURE — 99232 SBSQ HOSP IP/OBS MODERATE 35: CPT | Performed by: PHYSICIAN ASSISTANT

## 2022-08-01 PROCEDURE — 81001 URINALYSIS AUTO W/SCOPE: CPT

## 2022-08-01 PROCEDURE — 86592 SYPHILIS TEST NON-TREP QUAL: CPT | Performed by: PSYCHIATRY & NEUROLOGY

## 2022-08-01 PROCEDURE — 96361 HYDRATE IV INFUSION ADD-ON: CPT

## 2022-08-01 PROCEDURE — 99283 EMERGENCY DEPT VISIT LOW MDM: CPT

## 2022-08-01 PROCEDURE — 85025 COMPLETE CBC W/AUTO DIFF WBC: CPT | Performed by: PSYCHIATRY & NEUROLOGY

## 2022-08-01 PROCEDURE — 82607 VITAMIN B-12: CPT | Performed by: PSYCHIATRY & NEUROLOGY

## 2022-08-01 PROCEDURE — 80061 LIPID PANEL: CPT | Performed by: PSYCHIATRY & NEUROLOGY

## 2022-08-01 PROCEDURE — 80053 COMPREHEN METABOLIC PANEL: CPT | Performed by: PSYCHIATRY & NEUROLOGY

## 2022-08-01 RX ORDER — OLANZAPINE 10 MG/1
10 TABLET, ORALLY DISINTEGRATING ORAL
Status: DISCONTINUED | OUTPATIENT
Start: 2022-08-01 | End: 2022-08-05 | Stop reason: HOSPADM

## 2022-08-01 RX ADMIN — Medication 325 MG: at 09:27

## 2022-08-01 RX ADMIN — SODIUM CHLORIDE 1000 ML: 0.9 INJECTION, SOLUTION INTRAVENOUS at 14:10

## 2022-08-01 RX ADMIN — DOCUSATE SODIUM 100 MG: 100 CAPSULE, LIQUID FILLED ORAL at 09:27

## 2022-08-01 RX ADMIN — Medication 3 MG: at 22:04

## 2022-08-01 RX ADMIN — SENNOSIDES 17.2 MG: 8.6 TABLET, FILM COATED ORAL at 22:03

## 2022-08-01 RX ADMIN — OLANZAPINE 10 MG: 10 TABLET, ORALLY DISINTEGRATING ORAL at 22:03

## 2022-08-01 NOTE — DISCHARGE INSTRUCTIONS
Make sure you are staying hydrated and drinking lots of fluids  Return to the ER if you develop flank pain, blood in urine, cannot urinate for > 12 hours, fevers, feel faint, lightheaded, dizzy like you may pass out

## 2022-08-01 NOTE — ED PROVIDER NOTES
History  Chief Complaint   Patient presents with    Abnormal Lab     Elevated creatinine and BUN this morning at psych floor at [facility]  No prior labs for comparison  26 y/o female with PMH anxiety, depression, iron deficiency anemia presents to the ER today from behavioral health unit at [facility] for abnormal labs  Patient was found to have elevated BUN and creatinine on routine lab work done at the facility  Patient denies any complaints at this time: urinary symptoms, abdominal pain, lightheadedness, fevers, dry mouth, chest pain, shortness of breath  Admits that she hasn't been drinking enough water at her facility because they only provide it if you ask  They only give juice with meals and coffee for breakfast  Patient denies any history of any kidney disease or abnormal kidney function tests in the past        History provided by:  Patient   used: No        None       Past Medical History:   Diagnosis Date    Anxiety     Depression     Fibroids 07/25/2022    Iron deficiency anemia due to chronic blood loss 07/25/2022    Sleep difficulties        No past surgical history on file  No family history on file  I have reviewed and agree with the history as documented  E-Cigarette/Vaping    E-Cigarette Use Never User      E-Cigarette/Vaping Substances    Nicotine No     THC No     CBD No     Flavoring No     Other No     Unknown No      Social History     Tobacco Use    Smoking status: Never Smoker    Smokeless tobacco: Never Used   Vaping Use    Vaping Use: Never used   Substance Use Topics    Alcohol use: Yes     Comment: occassionally    Drug use: Never       Review of Systems   Constitutional: Negative for chills and fever  HENT: Negative for congestion  Respiratory: Negative for chest tightness and shortness of breath  Cardiovascular: Negative for chest pain  Gastrointestinal: Negative for abdominal pain, nausea and vomiting  Genitourinary: Negative for difficulty urinating, dysuria, flank pain, hematuria and vaginal bleeding  Musculoskeletal: Negative for back pain  Skin: Negative for color change  Neurological: Negative for light-headedness and headaches  Psychiatric/Behavioral: Negative for behavioral problems and sleep disturbance  All other systems reviewed and are negative  Physical Exam  Physical Exam  Vitals and nursing note reviewed  Constitutional:       General: She is awake  Appearance: Normal appearance  She is well-developed  HENT:      Head: Normocephalic and atraumatic  Right Ear: External ear normal       Left Ear: External ear normal       Nose: Nose normal    Eyes:      General: No scleral icterus  Extraocular Movements: Extraocular movements intact  Cardiovascular:      Rate and Rhythm: Normal rate and regular rhythm  Heart sounds: Normal heart sounds, S1 normal and S2 normal  No murmur heard  No gallop  Pulmonary:      Effort: Pulmonary effort is normal       Breath sounds: Normal breath sounds  No wheezing, rhonchi or rales  Abdominal:      General: Abdomen is flat  Bowel sounds are normal       Palpations: Abdomen is soft  Tenderness: There is no abdominal tenderness  There is no right CVA tenderness, left CVA tenderness, guarding or rebound  Musculoskeletal:         General: Normal range of motion  Cervical back: Normal range of motion  Skin:     General: Skin is warm and dry  Neurological:      General: No focal deficit present  Mental Status: She is alert  Psychiatric:         Attention and Perception: Attention and perception normal          Mood and Affect: Mood normal          Behavior: Behavior normal  Behavior is cooperative           Vital Signs  ED Triage Vitals [08/01/22 1310]   Temperature Pulse Respirations Blood Pressure SpO2   98 °F (36 7 °C) 90 14 120/74 100 %      Temp Source Heart Rate Source Patient Position - Orthostatic VS BP Location FiO2 (%)   Temporal Monitor Sitting Right arm --      Pain Score       No Pain           Vitals:    08/01/22 1310 08/01/22 1315 08/01/22 1612   BP: 120/74 120/74    Pulse: 90 82 66   Patient Position - Orthostatic VS: Sitting  Lying         Visual Acuity      ED Medications  Medications   sodium chloride 0 9 % bolus 1,000 mL (1,000 mL Intravenous New Bag 8/1/22 1410)       Diagnostic Studies  Results Reviewed     Procedure Component Value Units Date/Time    Urine Microscopic [[id number]]  (Abnormal) Collected: 08/01/22 1520    Lab Status: Final result Specimen: Urine, Clean Catch Updated: 08/01/22 1545     RBC, UA Innumerable /hpf      WBC, UA 1-2 /hpf      Epithelial Cells Occasional /hpf      Bacteria, UA None Seen /hpf      MUCUS THREADS None Seen    UA w Reflex to Microscopic w Reflex to Culture [[id number]]  (Abnormal) Collected: 08/01/22 1520    Lab Status: Final result Specimen: Urine, Clean Catch Updated: 08/01/22 1534     Color, UA Red     Clarity, UA Cloudy     Specific [place], UA 1 010     pH, UA 6 0     Leukocytes, UA Small     Nitrite, UA Negative     Protein, UA Trace mg/dl      Glucose, UA Negative mg/dl      Ketones, UA Negative mg/dl      Urobilinogen, UA 0 2 E U /dl      Bilirubin, UA Negative     Occult Blood, UA Large                 No orders to display              Procedures  Procedures         ED Course                                             MDM  Number of Diagnoses or Management Options  Elevated BUN: new and requires workup  Elevated serum creatinine: new and requires workup  Diagnosis management comments: 26 y/o female sent from [facility] behavioral health unit for having labwork done today and they found an elevated BUN of 30 and creatinine of 1 85  There were no prior labs to compare to so unsure if this is patient's baseline  Patient denies any symptoms at this time including urinary symptoms, fevers, flank pain, shortness of breath   She states she has not been drinking any water over the last 4 days in the unit because they are not given any  Patient was sent to ER for rehydration via IV fluids  Vitals stable in ER  Physical Exam benign  Differential dehydration, PORSHA, UTI  Obtained UA and gave patient 1 L of normal saline fluids  UA showed RBCs but patient on her period at this time so otherwise normal  Patient given strict return to ER precautions both verbally and in discharge papers  Amount and/or Complexity of Data Reviewed  Clinical lab tests: ordered and reviewed    Risk of Complications, Morbidity, and/or Mortality  Presenting problems: low  Diagnostic procedures: minimal  Management options: low    Patient Progress  Patient progress: stable      Disposition  Final diagnoses:   Elevated BUN   Elevated serum creatinine     Time reflects when diagnosis was documented in both MDM as applicable and the Disposition within this note     Time User Action Codes Description Comment    8/1/2022  5:28 PM Scottie Best [R79 [id number]] Elevated BUN     8/1/2022  5:28 PM Julianne Lombardo [R79 [id number]] Elevated serum creatinine       ED Disposition     ED Disposition   Discharge    Condition   Stable    Date/Time   Mon Aug 1, 2022  5:27 PM    Comment   Nohelia Durant discharge to home/self care  Follow-up Information     Follow up With Specialties Details Why Contact Info Additional Sammi Latif [address] Emergency Department Emergency Medicine Go to   [address] [place] [address],D [id number]  [address] [facility] Emergency Department, [address] [facility] [address]          Patient's Medications    No medications on file       No discharge procedures on file      PDMP Review     None          ED Provider  Electronically Signed by           Simona Bennett PA-C  08/01/22 8924

## 2022-08-01 NOTE — PROGRESS NOTES
Progress Note - Behavioral Health     Jake Dies 27 y o  female MRN: [id number]   Unit/Bed#: [facility] 255-01 Encounter: [id number]    Behavior over the last 24 hours: some improvement  Bisi Wharton is a 77-year-old female with a history of psychosis who presents for psychiatric follow-up  Staff reports no behavioral issues overnight  She is pleasant, calm and cooperative upon approach  Affect is brighter and more appropriate and she appears calmer and less preoccupied  She reports her mood is improved and she feels much better  She states the Zyprexa has allowed her to think clearer, sleep better and overall feel less anxious and overwhelmed  She reports her auditory hallucinations are greatly improved, though not entirely resolved  She is no longer paranoid or suspicious  She has no additional concerns  Medication and meal compliant  No SI or HI      Sleep: improved, slept better  Appetite: normal  Medication side effects: No   ROS: all other systems are negative    Mental Status Evaluation:    Appearance:  age appropriate, adequate grooming, wearing hospital clothes   Behavior:  pleasant, cooperative, calm, less guarded, better eye contact   Speech:  normal rate and volume, fluent, clear   Mood:  improved, less anxious, no longer dysphoric   Affect:  reactive, slightly brighter, more appropriate   Thought Process:  organized, goal directed, linear, increased rate of thoughts   Associations: circumstantial associations   Thought Content:  no overt delusions, less Mormonism preoccupation, no longer paranoid, no spontaneous mention of delusional material during conversation   Perceptual Disturbances: no visual hallucinations, auditory hallucinations still present, but less frequent, cannot understand voices, able to ignore them, no longer preoccupied   Risk Potential: Suicidal ideation - None at present  Homicidal ideation - None at present  Potential for aggression - No   Sensorium:  oriented to person, place and time/date   Memory:  recent and remote memory grossly intact   Consciousness:  alert and awake   Attention/Concentration: attention span and concentration appear shorter than expected for age   Insight:  improving   Judgment: improving   Gait/Station: normal gait/station   Motor Activity: no abnormal movements     Vital signs in last 24 hours:    Temp:  [97 8 °F (36 6 °C)-98 3 °F (36 8 °C)] 97 8 °F (36 6 °C)  HR:  [74-84] 74  Resp:  [16-18] 16  BP: ()/(56-73) 94/56    Laboratory results: I have personally reviewed all pertinent laboratory/tests results    Results from the past 24 hours:   Recent Results (from the past 24 hour(s))   CBC and differential    Collection Time: 08/01/22  6:50 AM   Result Value Ref Range    WBC 4 64 4 31 - 10 16 Thousand/uL    RBC 4 35 3 81 - 5 12 Million/uL    Hemoglobin 8 4 (L) 11 5 - 15 4 g/dL    Hematocrit 31 3 (L) 34 8 - 46 1 %    MCV 72 (L) 82 - 98 fL    MCH 19 3 (L) 26 8 - 34 3 pg    MCHC 26 8 (L) 31 4 - 37 4 g/dL    RDW 32 5 (H) 11 6 - 15 1 %    Platelets 361 777 - 554 Thousands/uL    nRBC 0 /100 WBCs    Neutrophils Relative 47 43 - 75 %    Immat GRANS % 0 0 - 2 %    Lymphocytes Relative 37 14 - 44 %    Monocytes Relative 13 (H) 4 - 12 %    Eosinophils Relative 1 0 - 6 %    Basophils Relative 2 (H) 0 - 1 %    Neutrophils Absolute 2 21 1 85 - 7 62 Thousands/µL    Immature Grans Absolute 0 01 0 00 - 0 20 Thousand/uL    Lymphocytes Absolute 1 70 0 60 - 4 47 Thousands/µL    Monocytes Absolute 0 58 0 17 - 1 22 Thousand/µL    Eosinophils Absolute 0 05 0 00 - 0 61 Thousand/µL    Basophils Absolute 0 09 0 00 - 0 10 Thousands/µL   Comprehensive metabolic panel    Collection Time: 08/01/22  6:50 AM   Result Value Ref Range    Sodium 140 135 - 147 mmol/L    Potassium 4 7 3 5 - 5 3 mmol/L    Chloride 105 96 - 108 mmol/L    CO2 26 21 - 32 mmol/L    ANION GAP 9 4 - 13 mmol/L    BUN 30 (H) 5 - 25 mg/dL    Creatinine 1 85 (H) 0 60 - 1 30 mg/dL    Glucose 71 65 - 140 mg/dL    Glucose, Fasting 71 65 - 99 mg/dL    Calcium 8 9 8 3 - 10 1 mg/dL    AST 29 5 - 45 U/L    ALT 14 12 - 78 U/L    Alkaline Phosphatase 46 46 - 116 U/L    Total Protein 6 8 6 4 - 8 4 g/dL    Albumin 3 5 3 5 - 5 0 g/dL    Total Bilirubin 0 30 0 20 - 1 00 mg/dL    eGFR 35 ml/min/1 73sq m   Lipid panel    Collection Time: 08/01/22  6:50 AM   Result Value Ref Range    Cholesterol 156 See Comment mg/dL    Triglycerides 26 See Comment mg/dL    HDL, Direct 86 >=50 mg/dL    LDL Calculated 65 0 - 100 mg/dL    Non-HDL-Chol (CHOL-HDL) 70 mg/dl   TSH, 3rd generation with Free T4 reflex    Collection Time: 08/01/22  6:50 AM   Result Value Ref Range    TSH 3RD GENERATON 1 756 0 450 - 4 500 uIU/mL     Most Recent Labs:   Lab Results   Component Value Date    WBC 4 64 08/01/2022    RBC 4 35 08/01/2022    HGB 8 4 (L) 08/01/2022    HCT 31 3 (L) 08/01/2022     08/01/2022    RDW 32 5 (H) 08/01/2022    NEUTROABS 2 21 08/01/2022    SODIUM 140 08/01/2022    K 4 7 08/01/2022     08/01/2022    CO2 26 08/01/2022    BUN 30 (H) 08/01/2022    CREATININE 1 85 (H) 08/01/2022    GLUC 71 08/01/2022    CALCIUM 8 9 08/01/2022    AST 29 08/01/2022    ALT 14 08/01/2022    ALKPHOS 46 08/01/2022    TP 6 8 08/01/2022    ALB 3 5 08/01/2022    TBILI 0 30 08/01/2022    CHOLESTEROL 156 08/01/2022    HDL 86 08/01/2022    TRIG 26 08/01/2022    LDLCALC 65 08/01/2022    [place] 70 08/01/2022    DMI8TLRLSJHE 1 756 08/01/2022    PREGUR Negative 07/25/2022       Progress Toward Goals: progressing    Assessment/Plan   Principal Problem:    Psychosis ([address] )  Active Problems:    Fibroids    Anemia    Medical clearance for psychiatric admission      Recommended Treatment:     Planned medication and treatment changes: All current active medications have been reviewed  Encourage group therapy, milieu therapy and occupational therapy  Behavioral Health checks every 7 minutes    Patient with notable improvement today compared to Fridays encounter    Zyprexa appears to be helping her, recommend increasing to 10 mg at bedtime  She reports her hallucinations of demonic voices are improved, though not entirely resolved  Was compliant with blood work this morning, CBC demonstrates improving anemia, hemoglobin now increased to 8 4  CMP demonstrates decreased renal function with creatinine 1 85 and GFR 35  Patient is asymptomatic  She states that she has not been drinking much water on the unit  Reports her urinary habits are at baseline and has not noticed oliguria or decreased frequency  I reached out to medical to discuss, we unfortunately do not have comparison labs available for review  Medical PA states that patient may require transfer to the ED for IV fluid bolus  We will await further recommendations      Current Facility-Administered Medications   Medication Dose Route Frequency Provider Last Rate    acetaminophen  650 mg Oral Q6H PRN Yumi Catching, PA-C      acetaminophen  650 mg Oral Q4H PRN Yumi Catching, PA-C      acetaminophen  975 mg Oral Q6H PRN Everest Catching, PA-C      aluminum-magnesium hydroxide-simethicone  30 mL Oral Q4H PRN Everest Catching, PA-C      artificial tear  1 application Both Eyes Y2T PRN Yumi Catching, PA-C      haloperidol lactate  2 5 mg Intramuscular Q4H PRN Max 4/day Everest Catching, PA-C      And    LORazepam  1 mg Intramuscular Q4H PRN Max 4/day Yumi Catching, PA-C      And    benztropine  0 5 mg Intramuscular Q4H PRN Max 4/day Everest Catching, PA-C      haloperidol lactate  5 mg Intramuscular Q4H PRN Max 4/day Everest Catching, PA-C      And    LORazepam  2 mg Intramuscular Q4H PRN Max 4/day Yumi Catching, PA-C      And    benztropine  1 mg Intramuscular Q4H PRN Max 4/day Everest Catching, PA-C      benztropine  1 mg Intramuscular Q4H PRN Max 6/day Yumi Catching, PA-C      benztropine  1 mg Oral Q4H PRN Max 6/day Yumi Catching, PA-C      bisacodyl  10 mg Rectal Daily PRN Tiffany Gaytan MD  docusate sodium  100 mg Oral BID Pat Reeks, ANDRES      ferrous sulfate  325 mg Oral Daily With Breakfast Pat Reeks, ANDRES      haloperidol  1 mg Oral Q6H PRN Pat Reeks, PA-DIANA      haloperidol  2 5 mg Oral Q4H PRN Max 4/day Pat Reeks, PA-DIANA      haloperidol  5 mg Oral Q4H PRN Max 4/day Pat Reeks, PA-DIANA      hydrOXYzine HCL  25 mg Oral Q6H PRN Max 4/day Pat Reeks, ANDRES      hydrOXYzine HCL  50 mg Oral Q4H PRN Max 4/day Pat Reeks, ANDRES      Or    LORazepam  1 mg Intramuscular Q4H PRN Pat Reeks, ANDRES      LORazepam  1 mg Oral Q4H PRN Max 6/day Pat Reeks, ANDRES      Or    LORazepam  2 mg Intramuscular Q6H PRN Max 3/day Pat Reeks, ANDRES      melatonin  3 mg Oral HS Pat Reeks, ANDRES      OLANZapine  10 mg Oral HS Pat Reeks, ANDRES      polyethylene glycol  17 g Oral Daily PRN Paris Thomas MD      senna  2 tablet Oral HS Pat Reeks, ANDRES      senna-docusate sodium  1 tablet Oral Daily PRN Pat Reeks, ANDRES      simethicone  80 mg Oral Q6H PRN Júnior Marquez DO      traZODone  50 mg Oral HS PRN Pat Reeks, ANDRES       Risks / Benefits of Treatment:    Risks, benefits, and possible side effects of medications explained to patient and patient verbalizes understanding and agreement for treatment  Counseling / Coordination of Care:    Patient's progress discussed with staff in treatment team meeting  Medications, treatment progress and treatment plan reviewed with patient      Pat Paulson PA-C 08/01/22

## 2022-08-01 NOTE — ED NOTES
Josue reese [employer] for transport   They will call back with transfer time     Murray hCen RN  08/01/22 1488

## 2022-08-01 NOTE — QUICK NOTE
Spoke with medical PA, Lavonne Merlos, regarding patient's decreased renal function (creatinine 1 85, GFR 35 on labs this morning)  No labs available for comparison  Giovanna's recommendation was for patient to receive IV fluids in the ED, recheck renal function and likely return back to the unit later today  Zamzam Castro our charge nurse was informed of this and will be coordinating transport    I will reach out to the physicians in the ED to notify of patient's impending arrival

## 2022-08-01 NOTE — PROGRESS NOTES
Status: Pt is pleasant & less guarded  She states she wants to be discharged to a shelter  Pt did confront a male peer about his attention seeking behavior  She appeared to have slept overnight  Medication: Zyprexa increased to 7 5mg HS / PRN - Tylenol  D/C: TBD / Pt did call 211 & CM gave her the numbers to [place], [address] to follow-up  Pt was referred to [facility] for [facility] outpatient  If she were to d/c to the [place] [id number], they are in [place]  08/01/22 0750   Team Meeting   Meeting Type Daily Rounds   Team Members Present   Team Members Present Physician;Nurse;; Other (Discipline and Name)   Physician Team Member Dr Tyrone Kelsey / Douglas Vyas Team Member Neeru Mcduffie / Crystal Gandara Management Team Member Dayana Ingram / Canelo Stephen   Other (Discipline and Name) Bradley Jett (art therapy)   Patient/Family Present   Patient Present No   Patient's Family Present No

## 2022-08-01 NOTE — TREATMENT TEAM
08/01/22 1000   Activity/Group Checklist   Group Community meeting   Attendance Attended   Attendance Duration (min) 16-30   Interactions Interacted appropriately   Affect/Mood Appropriate   Goals Achieved Able to listen to others; Able to engage in interactions     Check ins and schedule review was completed at the beginning of the session  Patient fully participated in community meeting  She worked on her goal card and participated in the ice breaker activity  She shared her goals during group discussion  Goal cards collected to be distributed to appropriate staff to review during wrap-up group  Pt was more pleasant today than Friday, as evidenced by her smiling throughout and having positive interactions with peers and staff

## 2022-08-01 NOTE — NURSING NOTE
Patient pleasant, however, anxious and praying for friends with bible open, upon approach  When discussing symptoms, reports "everything just happened so fast, and I feel like it isn't normal for a person to be this anxious for things in the future" and was reassured that she appears to be composing herself rather well in this moment, and validating that the fear of the unknown can be frightening and anxiety-provoking for many individuals  Pt took a deep breath and appeared relieved  She denied any thoughts to hurt self or others, but does report anxiety and some paranoia for the future regarding employment and her housing, however was reminded and again, reassured that her tx team will support and guide where necessary regarding these concerns upon discharge  Pt thanked for support  Was reminded of pending transport to [facility] for IVF due to out of range lab work and denied any concerns  Pt did not appear preoccupied or to be RIS throughout interactions this morning

## 2022-08-02 LAB — FOLATE SERPL-MCNC: 6.5 NG/ML (ref 3.1–17.5)

## 2022-08-02 PROCEDURE — 99232 SBSQ HOSP IP/OBS MODERATE 35: CPT | Performed by: PHYSICIAN ASSISTANT

## 2022-08-02 RX ADMIN — OLANZAPINE 10 MG: 10 TABLET, ORALLY DISINTEGRATING ORAL at 21:49

## 2022-08-02 RX ADMIN — Medication 3 MG: at 21:49

## 2022-08-02 RX ADMIN — Medication 325 MG: at 09:24

## 2022-08-02 RX ADMIN — DOCUSATE SODIUM 100 MG: 100 CAPSULE, LIQUID FILLED ORAL at 09:24

## 2022-08-02 RX ADMIN — DOCUSATE SODIUM 100 MG: 100 CAPSULE, LIQUID FILLED ORAL at 21:49

## 2022-08-02 NOTE — NURSING NOTE
Mildred Lee returned from [facility] at 20:00  Patient had been given IV fluids  Patient's vital signs WNL upon arrival, denies pain or questions at this time  Skin check performed by this writer and additional RN, unremarkable  Mildred Comes denies current SI/HI/AH/VH, reports wanting to join peers in wrap-up group  Patient encouraged to seek staff with any issues, verbalized understanding

## 2022-08-02 NOTE — PLAN OF CARE
Problem: SELF HARM/SUICIDALITY  Goal: Will have no self-injury during hospital stay  Description: INTERVENTIONS:  - Q 15 MINUTES: Routine safety checks  - Q WAKING SHIFT & PRN: Assess risk to determine if routine checks are adequate to maintain patient safety  - Encourage patient to participate actively in care by formulating a plan to combat response to suicidal ideation, identify supports and resources  Outcome: Progressing     Problem: DEPRESSION  Goal: Will be euthymic at discharge  Description: INTERVENTIONS:  - Administer medication as ordered  - Provide emotional support via 1:1 interaction with staff  - Encourage involvement in milieu/groups/activities  - Monitor for social isolation  Outcome: Progressing     Problem: PSYCHOSIS  Goal: Will report no hallucinations or delusions  Description: Interventions:  - Administer medication as  ordered  - Every waking shifts and PRN assess for the presence of hallucinations and or delusions  - Assist with reality testing to support increasing orientation  - Assess if patient's hallucinations or delusions are encouraging self-harm or harm to others and intervene as appropriate  Outcome: Progressing

## 2022-08-02 NOTE — CASE MANAGEMENT
CM received a call from Patsie Osler, who identified herself as the Pt's mother & said that she knew that CM could not share information, but she wanted an update on how Pt is doing  CM reviewed that without a signed EMILIANO she cannot even confirm or deny if Pt was there  Janet Jordan said that she was told that Pt was at [facility] & she wanted to know if she was okay & also she knows that she probably needs clothes & she could bring items to her  CM reiterated without a signed EMILIANO no information could be given or confirmed  CM agreed to take down her phone number & if at sometime an EMILIANO is obtained, she could be contacted  Janet Jordan provided home phone number [phone] & her , Alf's cell phone number [phone]

## 2022-08-02 NOTE — TREATMENT TEAM
08/02/22 1000   Activity/Group Checklist   Group Community meeting   Attendance Attended   Attendance Duration (min) 16-30   Interactions Interacted appropriately   Affect/Mood Appropriate;Bright   Goals Achieved Able to engage in interactions; Able to listen to others

## 2022-08-02 NOTE — PROGRESS NOTES
Status: Pt was sent to [facility] ER after CMP demonstrated decreased renal function  Pt was given fluids & UA done & she returned to the unit by 2000  Pt denied any SI/HI or hallucinations & joined her peers in wrap-up group upon her return  Pt appeared to have slept overnight  Medication: Zyprexa increased to 10mg HS / no  PRNs  D/C: Thurs / Pt was referred to [facility] for [facility] outpatient on 7/28  She has no place to go, but did register with [facility] on 7/29 08/02/22 0750   Team Meeting   Meeting Type Daily Rounds   Team Members Present   Team Members Present Physician;Nurse; Other (Discipline and Name);    Physician Team Member Dr Tyrone Kelsey / Francis Andujar / Stephany Kumari Team Member Neeru Mcduffie / Crystal Gandara Management Team Member Dayana Ingram / Canelo Stephen   Other (Discipline and Name) Bradley Jett (art therapy)   Patient/Family Present   Patient Present No   Patient's Family Present No

## 2022-08-02 NOTE — PROGRESS NOTES
Progress Note - Behavioral Health     Dinah Mclain 27 y o  female MRN: [id number]   Unit/Bed#: [facility] 255-01 Encounter: [id number]    Behavior over the last 24 hours: unchanged  Larry Schmid is a 27year old female with a history of psychosis presenting for a physiatric follow up  She was sent to the ER yesterday afternoon for PORSHA with a Cr of 1 85  She received IV fluids in the ED and was discharged back to our facility without any complications  She was initially calm, cooperative, and pleasant upon approach  She states she feels the Zyprexa is helping her feel more calm and denies any side effects  She states "I had a break  Like a psychotic break when I first came in  There was so much weighing on me  But I'm starting to think clearer now and the medication is helping " She denies any VH, and states her AH been less frequent in nature  She is agreeable to continuing her medications  After mentioning repeat labwork was ordered for tomorrow to recheck her kidney function, she became guarded, suspicious, and paranoid  She initially was hesitant to receive further explanation, but after discussing and addressing her concerns, she was more cooperative overall  Does complain of some loose stools and is requesting we discontinue one of her stool softeners  She denied any SI or HI       Sleep: improved  Appetite: normal  Medication side effects: No   ROS: all other systems are negative    Mental Status Evaluation:    Appearance:  age appropriate, marginal hygiene, dressed in hospital attire   Behavior:  guarded   Speech:  clear, coherent, pressured   Mood:  anxious   Affect:  constricted, mood-congruent   Thought Process:  coherent, increased rate of thoughts, organized   Associations: circumstantial associations   Thought Content:  paranoia towards staff and her parents   Perceptual Disturbances: does not appear responding to internal stimuli, auditory hallucinations still present, but less frequent, no visual hallucinations, no commands from    Risk Potential: Suicidal ideation - None at present  Homicidal ideation - None  Potential for aggression - Not at present   Sensorium:  oriented to person, place and time/date   Memory:  recent and remote memory grossly intact   Consciousness:  alert and awake   Attention/Concentration: attention span and concentration appear shorter than expected for age   Insight:  fair and improving   Judgment: fair and improving   Gait/Station: normal gait/station   Motor Activity: no abnormal movements     Vital signs in last 24 hours:    Temp:  [97 9 °F (36 6 °C)-98 °F (36 7 °C)] 97 9 °F (36 6 °C)  HR:  [66-90] 66  Resp:  [14-16] 16  BP: (101-120)/(57-74) 101/57    Laboratory results: I have personally reviewed all pertinent laboratory/tests results    Results from the past 24 hours:   Recent Results (from the past 24 hour(s))   UA w Reflex to Microscopic w Reflex to Culture    Collection Time: 08/01/22  3:20 PM    Specimen: Urine, Clean Catch   Result Value Ref Range    Color, UA Red     Clarity, UA Cloudy     Specific Dwight, UA 1 010 1 003 - 1 030    pH, UA 6 0 4 5, 5 0, 5 5, 6 0, 6 5, 7 0, 7 5, 8 0    Leukocytes, UA Small (A) Negative    Nitrite, UA Negative Negative    Protein, UA Trace (A) Negative mg/dl    Glucose, UA Negative Negative mg/dl    Ketones, UA Negative Negative mg/dl    Urobilinogen, UA 0 2 0 2, 1 0 E U /dl E U /dl    Bilirubin, UA Negative Negative    Occult Blood, UA Large (A) Negative   Urine Microscopic    Collection Time: 08/01/22  3:20 PM   Result Value Ref Range    RBC, UA Innumerable (A) None Seen, 0-1, 1-2, 2-4, 0-5 /hpf    WBC, UA 1-2 None Seen, 0-1, 1-2, 0-5, 2-4 /hpf    Epithelial Cells Occasional None Seen, Occasional /hpf    Bacteria, UA None Seen None Seen, Occasional /hpf    MUCUS THREADS None Seen None Seen     Most Recent Labs:   Lab Results   Component Value Date    WBC 4 64 08/01/2022    RBC 4 35 08/01/2022    HGB 8 4 (L) 08/01/2022    HCT 31 3 (L) 08/01/2022     08/01/2022    RDW 32 5 (H) 08/01/2022    NEUTROABS 2 21 08/01/2022    SODIUM 140 08/01/2022    K 4 7 08/01/2022     08/01/2022    CO2 26 08/01/2022    BUN 30 (H) 08/01/2022    CREATININE 1 85 (H) 08/01/2022    GLUC 71 08/01/2022    CALCIUM 8 9 08/01/2022    AST 29 08/01/2022    ALT 14 08/01/2022    ALKPHOS 46 08/01/2022    TP 6 8 08/01/2022    ALB 3 5 08/01/2022    TBILI 0 30 08/01/2022    CHOLESTEROL 156 08/01/2022    HDL 86 08/01/2022    TRIG 26 08/01/2022    LDLCALC 65 08/01/2022    [place] 70 08/01/2022    [id number] 1 756 08/01/2022    PREGUR Negative 07/25/2022    RPR Non-Reactive 08/01/2022       Progress Toward Goals: progressing    Assessment/Plan   Principal Problem:    Psychosis ([facility] [address] )  Active Problems:    Fibroids    Anemia    Medical clearance for psychiatric admission      Recommended Treatment:     Planned medication and treatment changes: All current active medications have been reviewed  Encourage group therapy, milieu therapy and occupational therapy  Behavioral Health checks every 7 minutes    Continue Zyprexa 10mg qhs, patient appears to have responded favorably  Continue to monitor  Can titrate further if paranoia or other psychotic symptoms fail to respond  Encouraged PO hydration for PORSHA  Repeat BMP tomorrow  Patient agreeable       Current Facility-Administered Medications   Medication Dose Route Frequency Provider Last Rate    acetaminophen  650 mg Oral Q6H PRN Oddis Hence, PA-C      acetaminophen  650 mg Oral Q4H PRN Oddis Hence, PA-C      acetaminophen  975 mg Oral Q6H PRN Oddis Hence, PA-C      aluminum-magnesium hydroxide-simethicone  30 mL Oral Q4H PRN Oddis Hence, PA-C      artificial tear  1 application Both Eyes J4W PRN Oddis Hence, PA-C      haloperidol lactate  2 5 mg Intramuscular Q4H PRN Max 4/day Oddis Hence, PA-C      And    LORazepam  1 mg Intramuscular Q4H PRN Max 4/day Oddis Hence, PA-C      And   Tim Bravo benztropine  0 5 mg Intramuscular Q4H PRN Max 4/day Loyde Favor, PA-C      haloperidol lactate  5 mg Intramuscular Q4H PRN Max 4/day Loyde Favor, PA-C      And    LORazepam  2 mg Intramuscular Q4H PRN Max 4/day Loyde Favor, PA-C      And    benztropine  1 mg Intramuscular Q4H PRN Max 4/day Loyde Favor, PA-C      benztropine  1 mg Intramuscular Q4H PRN Max 6/day Loyde Favor, PA-C      benztropine  1 mg Oral Q4H PRN Max 6/day Loyde Favor, PA-C      bisacodyl  10 mg Rectal Daily PRN Claudio Coffman MD      docusate sodium  100 mg Oral BID Loyde Favor, PA-C      ferrous sulfate  325 mg Oral Daily With Breakfast Loyde Favor, PA-C      haloperidol  1 mg Oral Q6H PRN Loyde Favor, PA-C      haloperidol  2 5 mg Oral Q4H PRN Max 4/day Loyde Favor, PA-C      haloperidol  5 mg Oral Q4H PRN Max 4/day Loyde Favor, PA-C      hydrOXYzine HCL  25 mg Oral Q6H PRN Max 4/day Loyde Favor, PA-C      hydrOXYzine HCL  50 mg Oral Q4H PRN Max 4/day Loyde Favor, PA-C      Or    LORazepam  1 mg Intramuscular Q4H PRN Loyde Favor, PA-C      LORazepam  1 mg Oral Q4H PRN Max 6/day Loyde Favor, PA-C      Or    LORazepam  2 mg Intramuscular Q6H PRN Max 3/day Loyde Favor, PA-C      melatonin  3 mg Oral HS Loyde Favor, PA-C      OLANZapine  10 mg Oral HS Loyde Favor, PA-C      polyethylene glycol  17 g Oral Daily PRN Claudio Coffman MD      senna  2 tablet Oral HS Loyde Favor, PA-C      senna-docusate sodium  1 tablet Oral Daily PRN Loyde Favor, PA-C      simethicone  80 mg Oral Q6H PRN Celia Goss DO      traZODone  50 mg Oral HS PRN Loyde Favor, PA-C       Risks / Benefits of Treatment:    Risks, benefits, and possible side effects of medications explained to patient and patient verbalizes understanding and agreement for treatment      Counseling / Coordination of Care:    Patient's progress discussed with staff in treatment team meeting  Medications, treatment progress and treatment plan reviewed with patient      Max Irby PA-C 08/02/22
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
## 2022-08-02 NOTE — TREATMENT TEAM
08/02/22 1330   Activity/Group Checklist   Group Other (Comment)  (art therapy)   Attendance Attended  (sporadic attendance at times  paced around a bit )   Attendance Duration (min) 31-45   Interactions Interacted appropriately   Affect/Mood Appropriate   Goals Achieved Able to listen to others; Able to engage in interactions; Other (Comment)  (authentic, spontaneous self expression, connection, and insight)

## 2022-08-02 NOTE — NURSING NOTE
Cesar appears to have slept overnight, without interruption or periods of restlessness observed  Patient appears to still be sleeping now  Q7 minute safety checks to continue to promote patient safety

## 2022-08-03 LAB
ANION GAP SERPL CALCULATED.3IONS-SCNC: 8 MMOL/L (ref 4–13)
BUN SERPL-MCNC: 29 MG/DL (ref 5–25)
CALCIUM SERPL-MCNC: 9.2 MG/DL (ref 8.3–10.1)
CHLORIDE SERPL-SCNC: 106 MMOL/L (ref 96–108)
CO2 SERPL-SCNC: 26 MMOL/L (ref 21–32)
CREAT SERPL-MCNC: 1.73 MG/DL (ref 0.6–1.3)
EST. AVERAGE GLUCOSE BLD GHB EST-MCNC: 94 MG/DL
GFR SERPL CREATININE-BSD FRML MDRD: 37 ML/MIN/1.73SQ M
GLUCOSE P FAST SERPL-MCNC: 78 MG/DL (ref 65–99)
GLUCOSE SERPL-MCNC: 78 MG/DL (ref 65–140)
HBA1C MFR BLD: 4.9 %
POTASSIUM SERPL-SCNC: 4.7 MMOL/L (ref 3.5–5.3)
SODIUM SERPL-SCNC: 140 MMOL/L (ref 135–147)

## 2022-08-03 PROCEDURE — 99232 SBSQ HOSP IP/OBS MODERATE 35: CPT | Performed by: PHYSICIAN ASSISTANT

## 2022-08-03 PROCEDURE — 80048 BASIC METABOLIC PNL TOTAL CA: CPT | Performed by: PHYSICIAN ASSISTANT

## 2022-08-03 RX ADMIN — Medication 325 MG: at 09:40

## 2022-08-03 RX ADMIN — Medication 3 MG: at 21:21

## 2022-08-03 RX ADMIN — OLANZAPINE 10 MG: 10 TABLET, ORALLY DISINTEGRATING ORAL at 21:21

## 2022-08-03 RX ADMIN — DOCUSATE SODIUM 100 MG: 100 CAPSULE, LIQUID FILLED ORAL at 09:40

## 2022-08-03 RX ADMIN — DOCUSATE SODIUM 100 MG: 100 CAPSULE, LIQUID FILLED ORAL at 17:05

## 2022-08-03 RX ADMIN — ACETAMINOPHEN 975 MG: 325 TABLET ORAL at 12:52
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
## 2022-08-03 NOTE — TREATMENT TEAM
08/03/22 1000   Activity/Group Checklist   Group Community meeting   Attendance Attended   Attendance Duration (min) 16-30   Interactions Interacted appropriately   Affect/Mood Appropriate  (had moments where she would be spacing out, could see her body language transition )   Goals Achieved Able to engage in interactions; Able to listen to others

## 2022-08-03 NOTE — CASE MANAGEMENT
CM met with Pt who reported that she did try to call [address], [address]  She said that only the [employer] answered & they told her not to call them, to call 211  Pt said that she did call 211 & they just said that they referred her to those shelters  Pt said that she doesn't know what to do & CM reviewed that was the only shelters in the [facility] that unfortunately calling 211 was the process to get in & CM has not had success with receiving returned calls from 2-1-1 or the shelters themselves after individuals are registered  CM reviewed option of [facility] in [facility] but Pt said that she would not know how she would get around & do things that she needs to do in an unknown area  CM asked Pt if she was able to identify any family or friends that she could possibly stay with temporarily but she said no  Pt said that she didn't have friends really in the area, except old high school friends & she didn't have their phone numbers, she could maybe outreach them on social media  CM talked about the [employer] [facility] rehab program in [place] or [facility], but Pt not interested  Pt said that [facility] could meet with her on August 8, which isn't too far away  CM inquired if they had immediate housing for Pt & she said that they had transitional housing for her  Pt said that she would keep calling 211 & think about her options

## 2022-08-03 NOTE — QUICK NOTE
Kidney function remains decreased  Spoke with medical who discussed with nephrology  Nephro recommending recheck BMP tomorrow morning and then to check renal ultrasound if Cr remains increased and GFR decreased  Patient adamantly refusing to participate with lab orders  Signed a 72 hour notice and is now focused on discharge  Will provide her with a lab slip for BMP as outpatient since she is not willing to participate in further medical evaluation while on the unit
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
## 2022-08-03 NOTE — NURSING NOTE
Pt remains pleasant and calm  Signed 72 hour notice today at 1232  Pt denies SI/HI, reports having some anxiety however does not feel it to be overwhelming  Pt states enjoying group "When its about things I am in the hospital for " pt denies AVH  Reports depression has improved  Denies any questions at this time

## 2022-08-03 NOTE — PLAN OF CARE
Problem: DISCHARGE PLANNING  Goal: Discharge to home or other facility with appropriate resources  Description: INTERVENTIONS:  - Identify barriers to discharge w/patient and caregiver  - Arrange for needed discharge resources and transportation as appropriate  - Identify discharge learning needs (meds, wound care, etc )  - Arrange for interpretive services to assist at discharge as needed  - Refer to Case Management Department for coordinating discharge planning if the patient needs post-hospital services based on physician/advanced practitioner order or complex needs related to functional status, cognitive ability, or social support system  Outcome: Not Progressing  Note: Pt is not able to identify where she will go at discharge, but signed a 72 hour notice today (8/3) @ [phone]

## 2022-08-03 NOTE — NURSING NOTE
Pt remains pleasant in conversation  States she has applied for school and anxiously waiting for reply  Pt checked email this evening however has not received an update yet  Pt denies SI/HI, AVH  Reports "Well my day could always be worse so I guess Im happy about that " pt states she is trying to focus on positives in her life more  Pt denies any questions or concerns regarding treatment  Remains social with select peers
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
## 2022-08-03 NOTE — QUICK NOTE
Patient Cr 1 73, slightly improved since 1L IVF in ED 2 days ago (1 8)  Patient's baseline creatinine is unknown  Unable to obtain records from [facility]  Discussed with Nephrology on 8/3 --> originally planned for monitoring of labs however due to possible DC tomorrow and concern for inability for patient to follow up, will order CT A/P without contrast to ensure no hydronephrosis/obstructive cause of elevated Cr and reduced GFR as no US is available at [place]  Check BMP tomorrow 8/4  Further recs per results, Dr Aarti Patrick with nephro welcomes any questions  Encourage PO hydration

## 2022-08-04 PROBLEM — N28.9 DECREASED RENAL FUNCTION: Status: ACTIVE | Noted: 2022-08-04

## 2022-08-04 PROBLEM — Z00.8 MEDICAL CLEARANCE FOR PSYCHIATRIC ADMISSION: Status: RESOLVED | Noted: 2022-07-29 | Resolved: 2022-08-04

## 2022-08-04 PROCEDURE — 99232 SBSQ HOSP IP/OBS MODERATE 35: CPT | Performed by: PHYSICIAN ASSISTANT

## 2022-08-04 RX ORDER — FERROUS SULFATE 325(65) MG
325 TABLET ORAL
Qty: 30 TABLET | Refills: 0 | Status: SHIPPED | OUTPATIENT
Start: 2022-08-05 | End: 2022-09-04

## 2022-08-04 RX ORDER — LANOLIN ALCOHOL/MO/W.PET/CERES
3 CREAM (GRAM) TOPICAL
Qty: 30 TABLET | Refills: 0 | Status: SHIPPED | OUTPATIENT
Start: 2022-08-04 | End: 2022-09-03

## 2022-08-04 RX ORDER — DOCUSATE SODIUM 100 MG/1
100 CAPSULE, LIQUID FILLED ORAL 2 TIMES DAILY
Qty: 60 CAPSULE | Refills: 0 | Status: SHIPPED | OUTPATIENT
Start: 2022-08-04 | End: 2022-09-03

## 2022-08-04 RX ORDER — OLANZAPINE 10 MG/1
10 TABLET ORAL
Qty: 30 TABLET | Refills: 0 | Status: SHIPPED | OUTPATIENT
Start: 2022-08-04 | End: 2022-09-03

## 2022-08-04 RX ADMIN — DOCUSATE SODIUM 100 MG: 100 CAPSULE, LIQUID FILLED ORAL at 08:32

## 2022-08-04 RX ADMIN — Medication 3 MG: at 21:53

## 2022-08-04 RX ADMIN — OLANZAPINE 10 MG: 10 TABLET, ORALLY DISINTEGRATING ORAL at 21:53

## 2022-08-04 RX ADMIN — Medication 325 MG: at 08:32

## 2022-08-04 RX ADMIN — DOCUSATE SODIUM 100 MG: 100 CAPSULE, LIQUID FILLED ORAL at 17:50

## 2022-08-04 NOTE — NURSING NOTE
Pt has slight irritable edge throughout the day  States feeling "fine" and denies SI/HI, AVH  Pt states she was hoping to be discharged from the hospital by now so feeling frustrated with this  Pt declined CT stating "I didn't come this that stuff  Im only getting treatment for what I came for " Attempted to discuss importance of testing however pt states "No, I dont want it " and then walked away  Pt otherwise denies any concerns, attends groups and social with select peers

## 2022-08-04 NOTE — TREATMENT TEAM
08/04/22 1000   Activity/Group Checklist   Group Community meeting   Attendance Attended   Attendance Duration (min) 16-30   Interactions Interacted appropriately  (required redirection one time for engaging in side conversation, causing a disruption, to which she responded well  seemed religiously preoccupied at times )   Affect/Mood Appropriate;Bright   Goals Achieved Able to listen to others; Able to engage in interactions

## 2022-08-04 NOTE — PROGRESS NOTES
Progress Note - Behavioral Health     Dominique Rodriguez 27 y o  female MRN: [id number]   Unit/Bed#: U 255-01 Encounter: [id number]    Behavior over the last 24 hours: unchanged  Johnny Anand is a 57-year-old female with a history of psychosis who presents for psychiatric follow-up  Staff reports that she signed a 72 hour notice yesterday requesting voluntary withdrawal from treatment  She refused her labs and CT scan ordered by medical team in the context of decreased kidney function  She appears more upset today and at times illogical and paranoid  She states, I do not believe that I have any issues with my kidneys  I came here for my mental health and for housing, and now I am being burdened by what you tell me is going on  I am sorry, but I just do not believe it    For the 2nd time this week, I have the patient look at my computer screen and see that her labs are abnormal, and I once again explained that her creatinine is increased and her GFR is decreased for her age  I once again explained, as discussed with Nephrology and with Medical, that we do not have baseline labs available for comparison and that our medical team is recommending blood work and ultrasound or CT scan to further evaluate  Despite this explanation, and in the face of obvious evidence which confirms decreased kidney function, the patient still does not believe that she has any medical issues and is unwilling to participate in further workup and evaluation for this issue  This would appear to be delusional behavior  Despite this, the patient does not appear internally preoccupied as she did upon arrival to the unit and she confirms that her auditory hallucinations have resolved  She has been compliant with her Zyprexa, eating meals, attending to ADLs and attending groups  Apart from her delusional material regarding her medical conditions, she appears less paranoid, less overwhelmed and more calm    She is focused on discharge, denies SI and HI, no additional concerns  She is drinking water throughout the day  She is urinating regularly without issues  Sleep: normal  Appetite: normal  Medication side effects: No   ROS: all other systems are negative    Mental Status Evaluation:    Appearance:  age appropriate, adequate grooming, wearing hospital clothes   Behavior:  cooperative, calm, bizarre   Speech:  normal rate and volume   Mood:  dysphoric, anxious   Affect:  constricted, mood-congruent   Thought Process:  illogical, increased rate of thoughts, perseverative   Associations: circumstantial associations   Thought Content:  paranoid delusions   Perceptual Disturbances: no auditory hallucinations, no visual hallucinations, does not appear responding to internal stimuli   Risk Potential: Suicidal ideation - None at present  Homicidal ideation - None at present  Potential for aggression - No   Sensorium:  oriented to person, place and time/date   Memory:  recent and remote memory grossly intact   Consciousness:  alert and awake   Attention/Concentration: attention span and concentration are age appropriate   Insight:  limited   Judgment: fair   Gait/Station: normal gait/station   Motor Activity: no abnormal movements     Vital signs in last 24 hours:    Temp:  [97 6 °F (36 4 °C)-98 1 °F (36 7 °C)] 97 6 °F (36 4 °C)  HR:  [] 100  Resp:  [17-18] 18  BP: ()/(52-77) 116/77    Laboratory results: I have personally reviewed all pertinent laboratory/tests results    Results from the past 24 hours: No results found for this or any previous visit (from the past 24 hour(s))    Most Recent Labs:   Lab Results   Component Value Date    WBC 4 64 08/01/2022    RBC 4 35 08/01/2022    HGB 8 4 (L) 08/01/2022    HCT 31 3 (L) 08/01/2022     08/01/2022    RDW 32 5 (H) 08/01/2022    NEUTROABS 2 21 08/01/2022    SODIUM 140 08/03/2022    K 4 7 08/03/2022     08/03/2022    CO2 26 08/03/2022    BUN 29 (H) 08/03/2022    CREATININE 1 73 (H) 08/03/2022    GLUC 78 08/03/2022    CALCIUM 9 2 08/03/2022    AST 29 08/01/2022    ALT 14 08/01/2022    ALKPHOS 46 08/01/2022    TP 6 8 08/01/2022    ALB 3 5 08/01/2022    TBILI 0 30 08/01/2022    CHOLESTEROL 156 08/01/2022    HDL 86 08/01/2022    TRIG 26 08/01/2022    LDLCALC 65 08/01/2022    [place] 70 08/01/2022    RDS8RJIJYDFJ 1 756 08/01/2022    PREGUR Negative 07/25/2022    RPR Non-Reactive 08/01/2022    HGBA1C 4 9 08/01/2022    EAG 94 08/01/2022       Progress Toward Goals: discharge planning    Assessment/Plan   Principal Problem:    Psychosis ([address] )  Active Problems:    Fibroids    Anemia    Medical clearance for psychiatric admission      Recommended Treatment:     Planned medication and treatment changes: All current active medications have been reviewed  Encourage group therapy, milieu therapy and occupational therapy  Behavioral Health checks every 7 minutes    Patient signed a 72 hour notice and will need to be discharged tomorrow prior to expiration of this noticed  She is currently homeless because she refuses to move back in with her parents and refused to sign release of information for us to contact them and gather collateral   Case management is working with the patient to plan her discharge to a shelter  We will send her home with a month's worth of medications as well  She remains paranoid and delusional but not overtly psychotic, compliant with medications, attending ADLs, and eating meals  She is not suicidal or homicidal   There do not appear to be grounds for involuntary commitment and patient is unwilling to rescind her notice, though it was explained to her that she would likely benefit from additional days of treatment  We will send her home with a lab order for a BMP as an outpatient despite her continued refusal of workup here on the unit        Current Facility-Administered Medications   Medication Dose Route Frequency Provider Last Rate    acetaminophen  650 mg Oral Q6H PRN Cloteal Stallion, PA-C      acetaminophen  650 mg Oral Q4H PRN Cloteal Stallion, PA-C      acetaminophen  975 mg Oral Q6H PRN Cloteal Stallion, PA-C      aluminum-magnesium hydroxide-simethicone  30 mL Oral Q4H PRN Cloteal Stallion, PA-C      artificial tear  1 application Both Eyes D7K PRN Cloteal Stallion, PA-C      haloperidol lactate  2 5 mg Intramuscular Q4H PRN Max 4/day Cloteal Stallion, PA-C      And    LORazepam  1 mg Intramuscular Q4H PRN Max 4/day Cloteal Stallion, PA-C      And    benztropine  0 5 mg Intramuscular Q4H PRN Max 4/day Cloteal Stallion, PA-C      haloperidol lactate  5 mg Intramuscular Q4H PRN Max 4/day Cloteal Stallion, PA-C      And    LORazepam  2 mg Intramuscular Q4H PRN Max 4/day Cloteal Stallion, PA-C      And    benztropine  1 mg Intramuscular Q4H PRN Max 4/day Cloteal Stallion, PA-C      benztropine  1 mg Intramuscular Q4H PRN Max 6/day Cloteal Stallion, PA-C      benztropine  1 mg Oral Q4H PRN Max 6/day Cloteal Stallion, PA-C      bisacodyl  10 mg Rectal Daily PRN Yumiko Manley MD      docusate sodium  100 mg Oral BID Cloteal Stallion, PA-C      ferrous sulfate  325 mg Oral Daily With Breakfast Cloteal Stallion, PA-C      haloperidol  1 mg Oral Q6H PRN Cloteal Stallion, PA-C      haloperidol  2 5 mg Oral Q4H PRN Max 4/day Cloteal Stallion, PA-C      haloperidol  5 mg Oral Q4H PRN Max 4/day Cloteal Stallion, PA-C      hydrOXYzine HCL  25 mg Oral Q6H PRN Max 4/day Cloteal Stallion, PA-C      hydrOXYzine HCL  50 mg Oral Q4H PRN Max 4/day Cloteal Stallion, PA-C      Or    LORazepam  1 mg Intramuscular Q4H PRN Cloteal Stallion, PA-C      LORazepam  1 mg Oral Q4H PRN Max 6/day Cloteal Stallion, PA-C      Or    LORazepam  2 mg Intramuscular Q6H PRN Max 3/day Cloteal Stallion, PA-C      melatonin  3 mg Oral HS Cloteal Stallion, PA-C      OLANZapine  10 mg Oral HS Cloteal Stallion, PA-C      polyethylene glycol  17 g Oral Daily PRN Yumiko Manley MD  senna-docusate sodium  1 tablet Oral Daily PRN Jeffery Champion PA-C      simethicone  80 mg Oral Q6H PRN Isabel Organ, DO      traZODone  50 mg Oral HS PRN Jeffery Champion PA-C       Risks / Benefits of Treatment:    Risks, benefits, and possible side effects of medications explained to patient and patient verbalizes understanding and agreement for treatment  Counseling / Coordination of Care:    Patient's progress discussed with staff in treatment team meeting  Medications, treatment progress and treatment plan reviewed with patient      Jeffery Champion PA-C 08/04/22

## 2022-08-04 NOTE — BH TRANSITION RECORD
Contact Information: If you have any questions, concerns, pended studies, tests and/or procedures, or emergencies regarding your inpatient behavioral health visit  Please contact [place] behavioral [facility] unit [phone] and ask to speak to a , nurse or physician  A contact is available 24 hours/ 7 days a week at this number  Summary of Procedures Performed During your Stay:  Below is a list of major procedures performed during your hospital stay and a summary of results:  - Cardiac Procedures/Studies: ekg  Pending Studies (From admission, onward)     Start     Ordered    08/04/22 4449  Basic metabolic panel  Morning draw         08/03/22 1104    08/04/22 7851  Basic metabolic panel        Comments: This is a patient instruction: Patient fasting for 8 hours or longer recommended  08/04/22 1008    08/03/22 1941  CT abdomen pelvis wo contrast  1 time imaging        Question Answer Comment   Is the patient pregnant? Unknown    What is the patient's sedation requirement? No Sedation    Did the patient ever have bariatric surgery? No    Contrast Information: No contrast    Release to patient through Mychart Immediate    Reason for Exam (FREE TEXT) PORSHA, concern for OBX        08/03/22 1941              Please follow up on the above pending studies with your PCP and/or referring provider

## 2022-08-04 NOTE — DISCHARGE SUMMARY
Discharge Summary - [facility]   Julia Valdes 27 y o  female MRN: [id number]  Unit/Bed#: [facility] [place] 255-01 Encounter: [id number]     Admission Date: 7/28/2022         Discharge Date: 8/5/22    Attending Psychiatrist: Dr Tyrone Kelsey    Reason for Admission/HPI:     Per initial H&P from Dr Nida Cornell on 7/28/22:    "Patient is a 27 y o  female presents with Signs of acute psychosis  Patient was admitted to psychiatric unit on a voluntarily 201 commitment basis      Primary complaints include: paranoia and depression worse  Onset of symptoms was gradual starting 1 month ago with gradually worsening course since that time  Psychosocial Stressors: family and health      Per ED attending note on 7/25 "The patient tells me that she feels depressed for at least 1 month   She also tells me that she is having unusual thoughts, for instance she believes that her parents are out to get her and her usual  Norma Tuttle is hearing voices that she presumed with the voices of demdemetrio Tuttle tells me is result of this she no longer wants to live anymore   She did have a suicide attempt 1 month ago where she tried overdose on sleeping pills  Norma Tuttle tells me she does not have any suicide plan right now but continues to feel like she does not want to live and would rather be dead   She reports hospitalization a few months ago when she was an ingrown for uterine fibroids at which time she required a blood transfusion  "     When seen by crisis she reported she overdosed approx 1 month ago but did not seek treatment      While awaiting transfer patient was seen by IM in [facility] ED and iron supplements were started and transfusion given of 1 unit PRBC       No prior psychiatric hx until June 2022 ED visit (for depression) and this current presentation       On evaluation today she is sitting in day room with staff and eating breakfast  She reports feeling progressively more depressed and believing she was "spreading demons" wherever she went and unsafe at home  First hallucinations and belief in demdemetrio was when she was around 10-11 and she sent to [facility] for that  Now she recently feels she is being tormented at home with continued AVH and she told her family she was suicidal when she had her two recent overdoses  She has been living with parents since lost her job from [employer] and was brought to [facility] but her parents in April 2022 and a "deliverance" ritual was performed        She is looking forward to attending groups and will continue the zyprexa "      Social History     Tobacco History     Smoking Status  Never Smoker    Smokeless Tobacco Use  Never Used          Alcohol History     Alcohol Use Status  Yes Comment  occassionally          Drug Use     Drug Use Status  Never          Sexual Activity     Sexually Active  Not Currently          Activities of Daily Living    Not Asked               Additional Substance Use Detail     Questions Responses    Problems Due to Past Use of Alcohol? No    Problems Due to Past Use of Substances?  No    Substance Use Assessment Denies substance use within the past 12 months    Alcohol Use Frequency Experimented    Cannabis frequency Never used    Comment:  Never used on 7/28/2022     Heroin Frequency Denies use in past 12 months    Cocaine frequency Never used    Comment:  Never used on 7/28/2022     Crack Cocaine Frequency Denies use in past 12 months    Methamphetamine Frequency Denies use in past 12 months    Narcotic Frequency Denies use in past 12 months    Benzodiazepine Frequency Denies use in past 12 months    Amphetamine frequency Denies use in past 12 months    Barbituate Frequency Denies use use in past 12 months    Inhalant frequency Never used    Comment:  Never used on 7/28/2022     Hallucinogen frequency Never used    Comment:  Never used on 7/28/2022     Ecstasy frequency Never used    Comment:  Never used on 7/28/2022     Other drug frequency Never used    Comment:  Never used on 7/28/2022     Opiate frequency Denies use in past 12 months    Last reviewed by Ferdinand Rendon RN on 7/28/2022          Past Medical History:   Diagnosis Date    Anxiety     Depression     Fibroids 07/25/2022    Iron deficiency anemia due to chronic blood loss 07/25/2022    Sleep difficulties      No past surgical history on file  Medications: All current active medications have been reviewed  Medications prior to admission:    None       Allergies: Allergies   Allergen Reactions    Penicillins Other (See Comments)     Reaction Date: 21Jul2008; Reaction Date: 21Jul2008;       Augmentin [Amoxicillin-Pot Clavulanate] Rash       Objective     Vital signs in last 24 hours:    Temp:  [98 7 °F (37 1 °C)-99 1 °F (37 3 °C)] 99 1 °F (37 3 °C)  HR:  [78] 78  Resp:  [16] 16  BP: (109-118)/(77-81) 109/77    No intake or output data in the 24 hours ending 08/05/22 [address] Course:     Doug Lofton was admitted to the inpatient psychiatric unit and started on Behavioral Health checks every 7 minutes  During the hospitalization she was encouraged to attend individual therapy, group therapy, milieu therapy and occupational therapy  Psychiatric medications were adjusted over the hospital stay  To address psychotic symptoms, paranoid ideation, delusional thoughts, auditory hallucinations, disorganized behavior and anxiety symptoms, Doug Lofton was treated with antipsychotic medication Zyprexa  Medication doses were started instead during the hospital course  Zyprexa was added and titrated to 10mg qhs  Prior to beginning of treatment medications risks and benefits and possible side effects including risk of parkinsonian symptoms, Tardive Dyskinesia and metabolic syndrome related to treatment with antipsychotic medications were reviewed with Cesar  She verbalized understanding and agreement for treatment  Upon admission Doug Lofton was seen by medical service for medical clearance for inpatient treatment and medical follow up   Doug Lofton was found to have iron deficiency anemia upon arrival to the ED and was given 1 unit of packed red blood cells, started on oral iron and stabilized prior to arrival on the unit  Patient admitted to [facility] for many years and she was recommended to follow-up with GYN as an outpatient upon eventual discharge  During patient's admission labs, she was also found to have decreased renal function with a creatinine of 1 85 and GFR of 35 on 08/01/2022  Discussed case with medical, patient transferred to the ED for IV fluid administration and returned to the unit thereafter  Follow-up labs from 08/03/2022 demonstrated only slight improvement with creatinine decreasing to 1 73 and GFR increasing to 37  Case discussed with medical and Nephrology, nephrology recommending repeat labs and renal ultrasound, however, patient refused further participation in medical workup and signed a 72 hour notice requesting voluntary withdrawal from treatment at that time  Patient was given a lab slip for a repeat BMP as an outpatient, though she continues to decline further workup  She was eating and drinking without any issues during her time on the unit  She was voiding normally as well, and UA in the ED on 8/1 was remarkable only for the presence of blood, though patient was actively menstruating during collection, per reports  Shahnazslime's symptoms slowly improved over the hospital course  Initially after admission she was still feeling anxious, overwhelmed, irritable, paranoid, psychotic and confused  With adjustment of medications and therapeutic milieu her symptoms gradually improved  Patient signed a 72 hour notice voluntarily withdrawing herself from treatment  Though patient likely would have benefited from additional treatment days, there were no grounds for involuntary commitment, and she was not willing to rescind her notice   At the end of treatment Danilo Saha was improved somewhat; specifically, her anxiety was improved and her auditory hallucinations and Anabaptism preoccupation had resolved, but she remained generally paranoid of family and of staff, in particular when discussing her medical issues  Her mood was more stable at the time of discharge  Meghna Christina denied suicidal ideation, intent or plan at the time of discharge and denied homicidal ideation, intent or plan at the time of discharge  There was no overt psychosis at the time of discharge  Auditory hallucinations were resolved  Meghna Christina was participating appropriately in milieu at the time of discharge  Behavior was appropriate on the unit at the time of discharge  Sleep and appetite were improved  Meghna Christina was tolerating medications and was not reporting any significant side effects at the time of discharge  Cesar signed 72 hour notice and requested discharge  At the time of the 72 hour notice expiration she had no criteria for involuntary commitment and denied any suicidal or homicidal ideation  Patient was attending to all ADLs, taking medications appropriately, eating meals, and not demonstrating any clinical stigmata of acute psychosis  At the time of discharge, they were goal oriented, forward thinking and optimistic about the future      Mental Status at Time of Discharge:     Appearance:  age appropriate, adequate grooming, wearing hospital clothes   Behavior:  cooperative, calm   Speech:  normal rate and volume   Mood:  still somewhat anxious, less depressed   Affect:  appropriate   Thought Process:  organized, goal directed, linear   Associations: intact associations   Thought Content:  fixed paranoid delusions; no longer religiously preoccupied; focused on housing situation   Perceptual Disturbances: no auditory hallucinations, no visual hallucinations, does not appear responding to internal stimuli   Risk Potential: Suicidal ideation - None at present  Homicidal ideation - None at present  Potential for aggression - No   Sensorium:  oriented to person, place and time/date   Memory:  recent and remote memory grossly intact   Consciousness:  alert and awake   Attention/Concentration: attention span and concentration are age appropriate   Insight:  limited   Judgment: fair   Gait/Station: normal gait/station   Motor Activity: no abnormal movements       Admission Diagnosis:    Principal Problem:    Psychosis ([facility] )  Active Problems:    Fibroids    Anemia    Decreased renal function      Discharge Diagnosis:     Principal Problem:    Psychosis ([facility] )  Active Problems:    Fibroids    Anemia    Decreased renal function  Resolved Problems:    Medical clearance for psychiatric admission      Lab Results:   I have personally reviewed all pertinent laboratory/tests results  Most Recent Labs:   Lab Results   Component Value Date    WBC 4 64 08/01/2022    RBC 4 35 08/01/2022    HGB 8 4 (L) 08/01/2022    HCT 31 3 (L) 08/01/2022     08/01/2022    RDW 32 5 (H) 08/01/2022    NEUTROABS 2 21 08/01/2022    SODIUM 140 08/03/2022    K 4 7 08/03/2022     08/03/2022    CO2 26 08/03/2022    BUN 29 (H) 08/03/2022    CREATININE 1 73 (H) 08/03/2022    GLUC 78 08/03/2022    GLUF 78 08/03/2022    CALCIUM 9 2 08/03/2022    AST 29 08/01/2022    ALT 14 08/01/2022    ALKPHOS 46 08/01/2022    TP 6 8 08/01/2022    ALB 3 5 08/01/2022    TBILI 0 30 08/01/2022    CHOLESTEROL 156 08/01/2022    HDL 86 08/01/2022    TRIG 26 08/01/2022    LDLCALC 65 08/01/2022    NONHDLC 70 08/01/2022    IBP1TZZTEVHC 1 756 08/01/2022    PREGUR Negative 07/25/2022    RPR Non-Reactive 08/01/2022    HGBA1C 4 9 08/01/2022    EAG 94 08/01/2022       Discharge Medications:    See after visit summary for all reconciled discharge medications provided to patient and family  Discharge instructions/Information to patient and family:     See after visit summary for information provided to patient and family        Provisions for Follow-Up Care:    See after visit summary for information related to follow-up care and any pertinent home health orders  Discharge Statement:    I spent 33 minutes discharging the patient  This time was spent on the day of discharge  I had direct contact with the patient on the day of discharge  Additional documentation is required if more than 30 minutes were spent on discharge:    I reviewed with Cesar importance of compliance with medications and outpatient treatment after discharge  I discussed the medication regimen and possible side effects of the medications with Cesar prior to discharge  At the time of discharge she was tolerating psychiatric medications  I discussed outpatient follow up with Kerrie Hoff  I reviewed with Kerrie Hoff crisis plan and safety plan upon discharge  Kerrie Hoff was competent to understand risks and benefits of withholding information and risks and benefits of her actions  Shahnazslime signed 72 hour notice and requested discharge  At the time of the 72 hour notice expiration she had no criteria for involuntary commitment and denied any suicidal or homicidal ideation  Kerrie Kavya is currently homeless and agrees for a discharge to a shelter  Kerrie Hoff was advised to obtain BMP 1 week after discharge      Discharge on Two Antipsychotic Medications: No    Yumi Heck PA-C 08/05/22

## 2022-08-04 NOTE — TREATMENT TEAM
08/03/22 1330   Activity/Group Checklist   Group Other (Comment)  (art therapy)   Attendance Attended   Attendance Duration (min) 16-30  (came to group late, then had sporadic attendance )   Interactions Interacted appropriately   Affect/Mood Appropriate   Goals Achieved Able to listen to others; Able to engage in interactions; Other (Comment)  (spontaneous, authentic self expression, connection, insight)

## 2022-08-04 NOTE — TREATMENT TEAM
08/04/22 1330   Activity/Group Checklist   Group Other (Comment)  (art therapy)   Attendance Attended   Attendance Duration (min) 46-60   Interactions Interacted appropriately   Affect/Mood Appropriate   Goals Achieved Able to engage in interactions; Able to listen to others  (authentic, spontaneous self expression, connection, and insight)

## 2022-08-05 VITALS
WEIGHT: 125.88 LBS | OXYGEN SATURATION: 98 % | DIASTOLIC BLOOD PRESSURE: 77 MMHG | SYSTOLIC BLOOD PRESSURE: 109 MMHG | HEIGHT: 64 IN | BODY MASS INDEX: 21.49 KG/M2 | HEART RATE: 78 BPM | TEMPERATURE: 99.1 F | RESPIRATION RATE: 16 BRPM

## 2022-08-05 PROCEDURE — 99239 HOSP IP/OBS DSCHRG MGMT >30: CPT | Performed by: PHYSICIAN ASSISTANT

## 2022-08-05 RX ADMIN — DOCUSATE SODIUM 100 MG: 100 CAPSULE, LIQUID FILLED ORAL at 09:12

## 2022-08-05 RX ADMIN — Medication 325 MG: at 09:12

## 2022-08-05 NOTE — PROGRESS NOTES
Status: Pt was irritable in the morning, however, more pleasant in the evening  Pt refusing CT scan ordered by medical team for decreased kidney function, focused on only being here for mental health treatment & not medical   Pt had periods of restlessness overnight, but remained in bed  Medication: no changes / no PRNs  D/C: Today - via [employer] at 1015 / Pt declined referral for the [employer] in [facility] agreed to be d/c to the 's - copy of negative COVID test placed in d/c folder  Pt was referred to [facility], however, if she is going to be staying at the shelter ([address]) she will need to follow-up with [facility] for [facility] funded outpatient  Pt will be provided a 30 day supply of her medications at discharge  08/05/22 0750   Team Meeting   Meeting Type Daily Rounds   Team Members Present   Team Members Present Physician;Nurse; Other (Discipline and Name);    Physician Team Member Dr Karrie Bradford / [facility] Team Member Julián Eubanks / Nikolas Flores Management Team Member Pamella Gama   Other (Discipline and Name) Juanpablo Garcia (art therapy)   Patient/Family Present   Patient Present No   Patient's Family Present No

## 2022-08-05 NOTE — NURSING NOTE
AVS discharge instructions reviewed with patient  Patient denies any concerns or questions and expresses readiness for discharge  Patient was discharged from the unit pleasant and calm

## 2022-08-05 NOTE — NURSING NOTE
Pt in room at time of interaction  Discussing discharge planning  States they spoke with staff at [facility] for [facility] in [facility] and they need a COVID swab prior to leaving  Pt unsure of relationship with family going forward

## 2022-08-05 NOTE — CASE MANAGEMENT
CM met with Pt to review d/c plans & that a copy of her negative COVID test had been put in her d/c folder  Pt read & signed [employer] waiver & had no questions  Pt provided a 30 day supply of her medications  CM electronically sent [facility] transportation request; ETA 21  via [employer]

## 2022-08-05 NOTE — NURSING NOTE
Pt remains pleasant and calm this morning  Reports she slept well and is looking forward to being discharged later today  Pt denies SI/HI, AVH  Reports feeling an improvement with her mood  Reviewed scheduled medications with pt, and supports for after she leaves hospital  Pt denies any questions at this time

## 2022-08-05 NOTE — NURSING NOTE
Pt appears to have been asleep throughout the night, had some observed periods of restlessness, did not appear to wake

## 2022-08-05 NOTE — PLAN OF CARE
Problem: SELF HARM/SUICIDALITY  Goal: Will have no self-injury during hospital stay  Description: INTERVENTIONS:  - Q 15 MINUTES: Routine safety checks  - Q WAKING SHIFT & PRN: Assess risk to determine if routine checks are adequate to maintain patient safety  - Encourage patient to participate actively in care by formulating a plan to combat response to suicidal ideation, identify supports and resources  Outcome: Adequate for Discharge     Problem: DEPRESSION  Goal: Will be euthymic at discharge  Description: INTERVENTIONS:  - Administer medication as ordered  - Provide emotional support via 1:1 interaction with staff  - Encourage involvement in milieu/groups/activities  - Monitor for social isolation  Outcome: Adequate for Discharge     Problem: PSYCHOSIS  Goal: Will report no hallucinations or delusions  Description: Interventions:  - Administer medication as  ordered  - Every waking shifts and PRN assess for the presence of hallucinations and or delusions  - Assist with reality testing to support increasing orientation  - Assess if patient's hallucinations or delusions are encouraging self-harm or harm to others and intervene as appropriate  Outcome: Adequate for Discharge     Problem: Ineffective Coping  Goal: Participates in unit activities  Description: Interventions:  - Provide therapeutic environment   - Provide required programming   - Redirect inappropriate behaviors   Outcome: Adequate for Discharge     Problem: DISCHARGE PLANNING  Goal: Discharge to home or other facility with appropriate resources  Description: INTERVENTIONS:  - Identify barriers to discharge w/patient and caregiver  - Arrange for needed discharge resources and transportation as appropriate  - Identify discharge learning needs (meds, wound care, etc )  - Arrange for interpretive services to assist at discharge as needed  - Refer to Case Management Department for coordinating discharge planning if the patient needs post-hospital services based on physician/advanced practitioner order or complex needs related to functional status, cognitive ability, or social support system  Outcome: Adequate for Discharge

## 2022-08-05 NOTE — PROGRESS NOTES
AUDIT: 2, PAWSS: 0, Ethanol: 0, UDS: negative    Pt denied any alcohol, drug, or tobacco use  She was referred to [facility] for an assessment for [facility] outpatient

## 2023-06-13 ENCOUNTER — HOSPITAL ENCOUNTER (EMERGENCY)
Facility: HOSPITAL | Age: 33
Discharge: HOME | End: 2023-06-13
Attending: EMERGENCY MEDICINE
Payer: MEDICARE

## 2023-06-13 VITALS
TEMPERATURE: 98 F | HEART RATE: 64 BPM | RESPIRATION RATE: 18 BRPM | OXYGEN SATURATION: 100 % | SYSTOLIC BLOOD PRESSURE: 107 MMHG | DIASTOLIC BLOOD PRESSURE: 75 MMHG

## 2023-06-13 DIAGNOSIS — Z76.0 MEDICATION REFILL: Primary | ICD-10-CM

## 2023-06-13 DIAGNOSIS — Z86.59 HISTORY OF ANXIETY: ICD-10-CM

## 2023-06-13 RX ORDER — CLONAZEPAM 0.5 MG/1
0.5 TABLET ORAL DAILY PRN
Qty: 5 TABLET | Refills: 0 | Status: SHIPPED | OUTPATIENT
Start: 2023-06-14 | End: 2023-06-13 | Stop reason: SDUPTHER

## 2023-06-13 RX ORDER — CLONAZEPAM 0.5 MG/1
0.5 TABLET ORAL DAILY PRN
Qty: 5 TABLET | Refills: 0 | Status: SHIPPED | OUTPATIENT
Start: 2023-06-14

## 2023-06-13 RX ORDER — CLONAZEPAM 0.5 MG/1
0.5 TABLET ORAL ONCE
Status: COMPLETED | OUTPATIENT
Start: 2023-06-13 | End: 2023-06-13

## 2023-06-13 RX ADMIN — CLONAZEPAM 0.5 MG: 0.5 TABLET ORAL at 19:31

## 2023-06-13 NOTE — ED PROVIDER NOTES
History  Chief Complaint   Patient presents with   • Medication Refill     Pt presents to ed for a medication refill for klonopin 0 5mg for her anxiety reports being out of the medication for a week, has an appointment with pcp on thursday     Patient is a 49-year-old female seen in the emergency department requesting medication refill for Klonopin 0 5 mg  Patient states that she takes this medication daily  Patient states that she has been taking benzodiazepine medication for the past several months, but ran out of the medication approximately 1 week ago  Patient states that she is scheduled to follow up with her primary doctor at [facility] later this month  Patient notes no chest pain, shortness of breath, abdominal pain, nausea, vomiting, weakness, numbness, tingling  Patient has no other acute medical complaints in the emergency department  Prior to Admission Medications   Prescriptions Last Dose Informant Patient Reported? Taking? OLANZapine (ZyPREXA) 10 mg tablet   No No   Sig: Take 1 tablet (10 mg total) by mouth daily at bedtime   docusate sodium (COLACE) 100 mg capsule   No No   Sig: Take 1 capsule (100 mg total) by mouth 2 (two) times a day   ferrous sulfate 325 (65 Fe) mg tablet   No No   Sig: Take 1 tablet (325 mg total) by mouth daily with breakfast      Facility-Administered Medications: None       Past Medical History:   Diagnosis Date   • Anxiety    • Depression    • Fibroids 07/25/2022   • Iron deficiency anemia due to chronic blood loss 07/25/2022   • Sleep difficulties        History reviewed  No pertinent surgical history  History reviewed  No pertinent family history  I have reviewed and agree with the history as documented      E-Cigarette/Vaping   • E-Cigarette Use Never User      E-Cigarette/Vaping Substances   • Nicotine No    • THC No    • CBD No    • Flavoring No    • Other No    • Unknown No      Social History     Tobacco Use   • Smoking status: Never   • Smokeless tobacco: Never   Vaping Use   • Vaping Use: Never used   Substance Use Topics   • Alcohol use: Yes     Comment: occassionally   • Drug use: Never       Review of Systems   Constitutional: Negative for chills and fever  HENT: Negative for ear pain and sore throat  Eyes: Negative for pain and visual disturbance  Respiratory: Negative for cough and shortness of breath  Cardiovascular: Negative for chest pain and palpitations  Gastrointestinal: Negative for abdominal pain and vomiting  Genitourinary: Negative for decreased urine volume and difficulty urinating  Musculoskeletal: Negative for gait problem and neck stiffness  Skin: Negative for color change and rash  Neurological: Negative for weakness and numbness  Psychiatric/Behavioral: Negative for agitation and confusion  Physical Exam  Physical Exam  Vitals and nursing note reviewed  Constitutional:       General: She is not in acute distress  Appearance: She is well-developed  HENT:      Head: Normocephalic and atraumatic  Right Ear: External ear normal       Left Ear: External ear normal       Nose: Nose normal       Mouth/Throat:      Pharynx: Oropharynx is clear  Eyes:      General: No scleral icterus  Conjunctiva/sclera: Conjunctivae normal    Cardiovascular:      Rate and Rhythm: Normal rate  Comments: well-perfused extremities  Pulmonary:      Effort: Pulmonary effort is normal  No respiratory distress  Abdominal:      General: Abdomen is flat  There is no distension  Musculoskeletal:         General: No deformity or signs of injury  Cervical back: Normal range of motion and neck supple  Skin:     General: Skin is dry  Findings: No rash  Neurological:      General: No focal deficit present  Mental Status: She is alert  Cranial Nerves: No cranial nerve deficit  Sensory: No sensory deficit  Psychiatric:         Mood and Affect: Mood normal          Thought Content:  Thought content normal          Vital Signs  ED Triage Vitals [06/13/23 1857]   Temperature Pulse Respirations Blood Pressure SpO2   98 °F (36 7 °C) 64 18 107/75 100 %      Temp Source Heart Rate Source Patient Position - Orthostatic VS BP Location FiO2 (%)   Oral Monitor Sitting Left arm --      Pain Score       --           Vitals:    06/13/23 1857   BP: 107/75   Pulse: 64   Patient Position - Orthostatic VS: Sitting         Visual Acuity      ED Medications  Medications   clonazePAM (KlonoPIN) tablet 0 5 mg (has no administration in time range)       Diagnostic Studies  Results Reviewed     None                 No orders to display              Procedures  Procedures         ED Course                               SBIRT 22yo+    Flowsheet Row Most Recent Value   Initial Alcohol Screen: US AUDIT-C     1  How often do you have a drink containing alcohol? 0 Filed at: 06/13/2023 1856   2  How many drinks containing alcohol do you have on a typical day you are drinking? 0 Filed at: 06/13/2023 1856   3a  Male UNDER 65: How often do you have five or more drinks on one occasion? 0 Filed at: 06/13/2023 1856   3b  FEMALE Any Age, or MALE 65+: How often do you have 4 or more drinks on one occassion? 0 Filed at: 06/13/2023 1856   Audit-C Score 0 Filed at: 06/13/2023 1856   WINNIE: How many times in the past year have you    Used an illegal drug or used a prescription medication for non-medical reasons? Never Filed at: 06/13/2023 [address] [place]  Patient is a 73-year-old female seen in the emergency department requesting refill of her home anxiety medication  Patient has no other acute medical complaints in the emergency department, and plans to follow up with her PCP at [facility]  Patient was provided a dose of her home medication, and was provided a brief prescription  Plan to have patient follow up with PCP/outpatient providers  Patient stable for discharge home    Discharge instructions were reviewed with patient  History of anxiety: chronic illness or injury  Medication refill: acute illness or injury  Risk  Prescription drug management  Disposition  Final diagnoses:   Medication refill   History of anxiety     Time reflects when diagnosis was documented in both MDM as applicable and the Disposition within this note     Time User Action Codes Description Comment    6/13/2023  7:13 PM Angy Huff Add [Z76 0] Medication refill     6/13/2023  7:14 PM Angy Huff Add [Z86 59] History of anxiety       ED Disposition     ED Disposition   Discharge    Condition   Stable    Date/Time   Tue Jun 13, 2023  7:13 PM    Comment   Kristi Baeza discharge to home/self care  Follow-up Information     Follow up With Specialties Details Why Contact Info Additional Information    Your primary doctor  Call in 1 day       [place] Call  As needed [address] [phone]  [address] , [place], [place], [address]          Patient's Medications   Discharge Prescriptions    CLONAZEPAM (KLONOPIN) 0 5 MG TABLET    Take 1 tablet (0 5 mg total) by mouth daily as needed for anxiety for up to 5 doses Do not start before June 14, 2023  Start Date: 6/14/2023 End Date: --       Order Dose: 0 5 mg       Quantity: 5 tablet    Refills: 0       No discharge procedures on file      PDMP Review       Value Time User    PDMP Reviewed  Yes 6/13/2023  7:16 PM Margaret Calix MD          ED Provider  Electronically Signed by           Margaret Calix MD  06/13/23 3427

## 2023-09-11 ENCOUNTER — HOSPITAL ENCOUNTER (EMERGENCY)
Facility: HOSPITAL | Age: 33
Discharge: HOME | End: 2023-09-11
Attending: EMERGENCY MEDICINE | Admitting: EMERGENCY MEDICINE
Payer: MEDICARE

## 2023-09-11 VITALS
OXYGEN SATURATION: 100 % | RESPIRATION RATE: 17 BRPM | TEMPERATURE: 97.9 F | HEART RATE: 88 BPM | SYSTOLIC BLOOD PRESSURE: 113 MMHG | DIASTOLIC BLOOD PRESSURE: 82 MMHG

## 2023-09-11 DIAGNOSIS — Z59.00 HOMELESS: Primary | ICD-10-CM

## 2023-09-11 LAB — ETHANOL EXG-MCNC: 0 MG/DL

## 2023-09-11 PROCEDURE — 99283 EMERGENCY DEPT VISIT LOW MDM: CPT

## 2023-09-11 PROCEDURE — 99284 EMERGENCY DEPT VISIT MOD MDM: CPT | Performed by: EMERGENCY MEDICINE

## 2023-09-11 PROCEDURE — 82075 ASSAY OF BREATH ETHANOL: CPT | Performed by: EMERGENCY MEDICINE

## 2023-09-11 SDOH — ECONOMIC STABILITY - HOUSING INSECURITY: HOMELESSNESS UNSPECIFIED: Z59.00
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
## 2023-09-11 NOTE — Clinical Note
Cindy Olsonon should be transferred out to behavioral [facility] and has been medically cleared.
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
## 2023-09-12 NOTE — ED NOTES
This writer discussed the patients current presentation and recommended discharge plan with Codey Moffett They agree with the patient being discharged at this time with referrals and/or information about shelters and outpatient. The patient was Instructed to follow up with the shelters. The patient was provided with referral information for:   Shelters & outpatient    This writer and the patient completed a safety plan. The patient was provided with a copy of their safety plan with encouragement to utilize the plan following discharge. In addition, the patient was instructed to call local [facility] crisis, other crisis services, [facility] or to go to the nearest ER immediately if their situation changes at any time. This writer discussed discharge plans with the patient , who agrees with and understands the discharge plans.          SAFETY PLAN  Warning Signs (thoughts, images, mood, behavior, situations) of a potential crisis: suicidal thoughts, increased depression, hallucinations    Coping Skills (what can I do to take my mind off the problem, or to keep myself safe): Read Bible    Outside Support (who can I reach out to for support and help): ED, Call [facility] Crisis @ [phone]        National Suicide Prevention Hotline:  [address] [place] [address] Drive [address]: [address]: [address] [place] [address] [phone] - Crisis   [phone] - Peer Support Talk Line (1-9pm daily)  [phone] - Teen Support Talk Line (1-9pm daily)  [address] [place] [address] [address] [facility] [address] [place] [address] [address] [place] [address] (Lorenza Edward) [phone] - [address] [facility]

## 2023-09-12 NOTE — DISCHARGE INSTRUCTIONS
Come back for suicidal or homicidal thoughts, hallucinations. Go to the homeless shelter when it opens.

## 2023-09-24 ENCOUNTER — HOSPITAL ENCOUNTER (EMERGENCY)
Facility: HOSPITAL | Age: 33
End: 2023-09-25
Attending: INTERNAL MEDICINE | Admitting: EMERGENCY MEDICINE
Payer: MEDICARE

## 2023-09-24 VITALS
DIASTOLIC BLOOD PRESSURE: 74 MMHG | TEMPERATURE: 97.8 F | WEIGHT: 130 LBS | RESPIRATION RATE: 16 BRPM | HEIGHT: 64 IN | BODY MASS INDEX: 22.2 KG/M2 | SYSTOLIC BLOOD PRESSURE: 108 MMHG | HEART RATE: 68 BPM | OXYGEN SATURATION: 98 %

## 2023-09-24 DIAGNOSIS — F25.9 SCHIZOAFFECTIVE DISORDER (HCC): Primary | ICD-10-CM

## 2023-09-24 LAB
AMPHETAMINES SERPL QL SCN: NEGATIVE
BARBITURATES UR QL: NEGATIVE
BENZODIAZ UR QL: NEGATIVE
COCAINE UR QL: NEGATIVE
ETHANOL EXG-MCNC: 0 MG/DL
EXT PREGNANCY TEST URINE: NEGATIVE
OPIATES UR QL SCN: NEGATIVE
OXYCODONE+OXYMORPHONE UR QL SCN: NEGATIVE
PCP UR QL: NEGATIVE
THC UR QL: NEGATIVE

## 2023-09-24 PROCEDURE — 81025 URINE PREGNANCY TEST: CPT | Performed by: PHYSICIAN ASSISTANT

## 2023-09-24 PROCEDURE — 80307 DRUG TEST PRSMV CHEM ANLYZR: CPT | Performed by: PHYSICIAN ASSISTANT

## 2023-09-24 PROCEDURE — 99285 EMERGENCY DEPT VISIT HI MDM: CPT | Performed by: PHYSICIAN ASSISTANT

## 2023-09-24 PROCEDURE — 82075 ASSAY OF BREATH ETHANOL: CPT | Performed by: PHYSICIAN ASSISTANT

## 2023-09-24 PROCEDURE — 99284 EMERGENCY DEPT VISIT MOD MDM: CPT

## 2023-09-24 RX ORDER — BISACODYL 10 MG
10 SUPPOSITORY, RECTAL RECTAL DAILY PRN
Status: CANCELLED | OUTPATIENT
Start: 2023-09-24

## 2023-09-24 RX ORDER — HALOPERIDOL 5 MG/ML
2.5 INJECTION INTRAMUSCULAR
Status: CANCELLED | OUTPATIENT
Start: 2023-09-24

## 2023-09-24 RX ORDER — HALOPERIDOL 5 MG/1
2.5 TABLET ORAL
Status: CANCELLED | OUTPATIENT
Start: 2023-09-24

## 2023-09-24 RX ORDER — LORAZEPAM 2 MG/ML
1 INJECTION INTRAMUSCULAR
Status: CANCELLED | OUTPATIENT
Start: 2023-09-24

## 2023-09-24 RX ORDER — POLYETHYLENE GLYCOL 3350 17 G/17G
17 POWDER, FOR SOLUTION ORAL DAILY PRN
Status: CANCELLED | OUTPATIENT
Start: 2023-09-24

## 2023-09-24 RX ORDER — ACETAMINOPHEN 325 MG/1
650 TABLET ORAL EVERY 6 HOURS PRN
Status: CANCELLED | OUTPATIENT
Start: 2023-09-24

## 2023-09-24 RX ORDER — TRAZODONE HYDROCHLORIDE 50 MG/1
50 TABLET ORAL
Status: CANCELLED | OUTPATIENT
Start: 2023-09-24

## 2023-09-24 RX ORDER — MAGNESIUM HYDROXIDE/ALUMINUM HYDROXICE/SIMETHICONE 120; 1200; 1200 MG/30ML; MG/30ML; MG/30ML
30 SUSPENSION ORAL EVERY 4 HOURS PRN
Status: CANCELLED | OUTPATIENT
Start: 2023-09-24

## 2023-09-24 RX ORDER — LORAZEPAM 2 MG/ML
2 INJECTION INTRAMUSCULAR
Status: CANCELLED | OUTPATIENT
Start: 2023-09-24

## 2023-09-24 RX ORDER — BENZTROPINE MESYLATE 1 MG/ML
0.5 INJECTION INTRAMUSCULAR; INTRAVENOUS
Status: CANCELLED | OUTPATIENT
Start: 2023-09-24

## 2023-09-24 RX ORDER — HALOPERIDOL 1 MG/1
1 TABLET ORAL EVERY 6 HOURS PRN
Status: CANCELLED | OUTPATIENT
Start: 2023-09-24

## 2023-09-24 RX ORDER — BENZTROPINE MESYLATE 1 MG/ML
1 INJECTION INTRAMUSCULAR; INTRAVENOUS
Status: CANCELLED | OUTPATIENT
Start: 2023-09-24

## 2023-09-24 RX ORDER — AMOXICILLIN 250 MG
1 CAPSULE ORAL DAILY PRN
Status: CANCELLED | OUTPATIENT
Start: 2023-09-24

## 2023-09-24 RX ORDER — HYDROXYZINE HYDROCHLORIDE 25 MG/1
25 TABLET, FILM COATED ORAL
Status: CANCELLED | OUTPATIENT
Start: 2023-09-24

## 2023-09-24 RX ORDER — LORAZEPAM 2 MG/ML
2 INJECTION INTRAMUSCULAR EVERY 6 HOURS PRN
Status: CANCELLED | OUTPATIENT
Start: 2023-09-24

## 2023-09-24 RX ORDER — HYDROXYZINE HYDROCHLORIDE 25 MG/1
100 TABLET, FILM COATED ORAL
Status: CANCELLED | OUTPATIENT
Start: 2023-09-24

## 2023-09-24 RX ORDER — ACETAMINOPHEN 325 MG/1
650 TABLET ORAL EVERY 4 HOURS PRN
Status: CANCELLED | OUTPATIENT
Start: 2023-09-24

## 2023-09-24 RX ORDER — LANOLIN ALCOHOL/MO/W.PET/CERES
3 CREAM (GRAM) TOPICAL
Status: CANCELLED | OUTPATIENT
Start: 2023-09-24

## 2023-09-24 RX ORDER — ACETAMINOPHEN 325 MG/1
975 TABLET ORAL EVERY 6 HOURS PRN
Status: CANCELLED | OUTPATIENT
Start: 2023-09-24

## 2023-09-24 RX ORDER — DIPHENHYDRAMINE HYDROCHLORIDE 50 MG/ML
50 INJECTION INTRAMUSCULAR; INTRAVENOUS EVERY 6 HOURS PRN
Status: CANCELLED | OUTPATIENT
Start: 2023-09-24

## 2023-09-24 RX ORDER — HALOPERIDOL 5 MG/1
5 TABLET ORAL
Status: CANCELLED | OUTPATIENT
Start: 2023-09-24

## 2023-09-24 RX ORDER — HALOPERIDOL 5 MG/ML
5 INJECTION INTRAMUSCULAR
Status: CANCELLED | OUTPATIENT
Start: 2023-09-24

## 2023-09-24 RX ORDER — BENZTROPINE MESYLATE 1 MG/1
1 TABLET ORAL
Status: CANCELLED | OUTPATIENT
Start: 2023-09-24

## 2023-09-24 RX ORDER — HYDROXYZINE HYDROCHLORIDE 25 MG/1
50 TABLET, FILM COATED ORAL
Status: CANCELLED | OUTPATIENT
Start: 2023-09-24

## 2023-09-24 NOTE — ED NOTES
CIS spoke with the patient and again advised of the 302 and offered the patient a 201. Patient agreed to sign the 201. Rights were explained, served, and understood. Patient was sitting up in bed eating a snack. She was calmer than earlier. She had tears rolling down her face.
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
## 2023-09-24 NOTE — Clinical Note
Mallory Couch should be transferred out to mental health, inpatient behavioral health at [facility], and has been medically cleared.

## 2023-09-24 NOTE — ED NOTES
Insurance Authorization for admission:   Phone call placed to [employer]. Phone number: [phone]     Spoke to [facility]    3 days approved. Level of care: 201  Review on **. Pending placement  Authorization # **.     Facility to call    Found on PROMISe:  [facility]  ID# [id number]

## 2023-09-24 NOTE — ED NOTES
Pt was tearful upon RN entering room. She admits "nothing makes sense anymore, there is no more love, no support in my world". Emotional support and supportive listening provided to patient. RN asked if patient would feel comfortable helping self starting with some self care (drinking provided water, eating provided snacks etc.. ). Pt was offered toileting and showering- she declined this. Pt was resistant but has agreed to try to care for self at this time. She is now eating her sandwich and drinking provided water. Pt's lips and mucous membranes noted to be dry, pt was provided chapstick and encouraged on oral hydration. Pt encouraged to ask if she has needs. Pt is now noted to be smiling and eating,  1:1 continued by ED mitzi Ledbetter.       Carlos Gilbert RN  09/24/23 [id number]

## 2023-09-24 NOTE — ED NOTES
Pt ate 90% of tray. She is tearful at this time. Garbage removed from room, pt was provided tissues. She denies having other needs at this time.  1:1 continues, Gabriel cartagena sitting on 1:1.      Aissatou Gonzalez RN  09/24/23 6124

## 2023-09-24 NOTE — ED NOTES
Patient was noted to be crying. RN to bedside to provide support, pt stated "I can't keep living like this, I don't belong here in the hospital". Patient was offered toileting, food, support, patient declined all stating "It won't matter to talk about it, it won't change". Pt is unable to confirm or deny if she is seeing or hearing things not present/having hallucinations. Continuous 1:1 to continue with ED Fede Waldron at bedside.      Trixie Austin RN  09/24/23 2083

## 2023-09-24 NOTE — ED NOTES
Patient is accepted at [facility]. Patient is accepted by Dr. Jazzy Bartholomew per Val Hurd. Transportation is arranged with [facility]. Transportation is scheduled for TBD. Patient may go to the floor at D. Per Val Hurd. transportation to be put on hold due to acuity at [facility], to be reviewed in the morning. Nurse report is to be called to [phone] prior to patient transfer.

## 2023-09-24 NOTE — ED NOTES
Pt did eat 100% of food provided, also drank 100% of 2 drinks provided. Pt is still hungry, Dietary was called and requested that a tray be brought to pt's room. OK received and a/w tray. More water was provided to patient. She is cooperative, calm, in bed watching TV. She denies other needs at this time.      Yoon Flynn RN  09/24/23 8190

## 2023-09-24 NOTE — ED NOTES
Pt was moved to ED2. Pt is tearful and was reluctant to change into paper scrubs. Parvin Smith PAC also at bedside with this RN, security outside of room. RN and PA-C were able to assist pt into changing into paper scrubs. Pt was provided emotional support, snacks and drink. Pt is now under 1:1 continuous in person monitoring, Zahraa sitting as observer.      Carson Baldwin RN  09/24/23 7582
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
## 2023-09-24 NOTE — ED NOTES
Patient resting comfortably, patient offered a new warm blanket. Patient denies needing anything at the moment.       Nolan Marie, [address]  09/24/23 1809

## 2023-09-25 ENCOUNTER — HOSPITAL ENCOUNTER (INPATIENT)
Facility: HOSPITAL | Age: 33
LOS: 5 days | Discharge: HOME | DRG: 754 | End: 2023-09-30
Attending: STUDENT IN AN ORGANIZED HEALTH CARE EDUCATION/TRAINING PROGRAM | Admitting: PSYCHIATRY & NEUROLOGY
Payer: COMMERCIAL

## 2023-09-25 DIAGNOSIS — F25.9 SCHIZOAFFECTIVE DISORDER (HCC): ICD-10-CM

## 2023-09-25 DIAGNOSIS — F39 MOOD DISORDER (HCC): Primary | ICD-10-CM

## 2023-09-25 PROCEDURE — 99245 OFF/OP CONSLTJ NEW/EST HI 55: CPT | Performed by: PSYCHIATRY & NEUROLOGY

## 2023-09-25 RX ORDER — POLYETHYLENE GLYCOL 3350 17 G/17G
17 POWDER, FOR SOLUTION ORAL DAILY PRN
Status: DISCONTINUED | OUTPATIENT
Start: 2023-09-25 | End: 2023-09-27

## 2023-09-25 RX ORDER — BISACODYL 10 MG
10 SUPPOSITORY, RECTAL RECTAL DAILY PRN
Status: DISCONTINUED | OUTPATIENT
Start: 2023-09-25 | End: 2023-09-27

## 2023-09-25 RX ORDER — TRAZODONE HYDROCHLORIDE 50 MG/1
50 TABLET ORAL
Status: DISCONTINUED | OUTPATIENT
Start: 2023-09-25 | End: 2023-09-30 | Stop reason: HOSPADM

## 2023-09-25 RX ORDER — HALOPERIDOL 5 MG/ML
5 INJECTION INTRAMUSCULAR
Status: DISCONTINUED | OUTPATIENT
Start: 2023-09-25 | End: 2023-09-26

## 2023-09-25 RX ORDER — MAGNESIUM HYDROXIDE/ALUMINUM HYDROXICE/SIMETHICONE 120; 1200; 1200 MG/30ML; MG/30ML; MG/30ML
30 SUSPENSION ORAL EVERY 4 HOURS PRN
Status: DISCONTINUED | OUTPATIENT
Start: 2023-09-25 | End: 2023-09-30 | Stop reason: HOSPADM

## 2023-09-25 RX ORDER — HALOPERIDOL 1 MG/1
1 TABLET ORAL EVERY 6 HOURS PRN
Status: DISCONTINUED | OUTPATIENT
Start: 2023-09-25 | End: 2023-09-26

## 2023-09-25 RX ORDER — BENZTROPINE MESYLATE 1 MG/ML
0.5 INJECTION INTRAMUSCULAR; INTRAVENOUS
Status: DISCONTINUED | OUTPATIENT
Start: 2023-09-25 | End: 2023-09-30 | Stop reason: HOSPADM

## 2023-09-25 RX ORDER — LANOLIN ALCOHOL/MO/W.PET/CERES
3 CREAM (GRAM) TOPICAL
Status: DISCONTINUED | OUTPATIENT
Start: 2023-09-25 | End: 2023-09-26

## 2023-09-25 RX ORDER — ACETAMINOPHEN 325 MG/1
650 TABLET ORAL EVERY 4 HOURS PRN
Status: DISCONTINUED | OUTPATIENT
Start: 2023-09-25 | End: 2023-09-30 | Stop reason: HOSPADM

## 2023-09-25 RX ORDER — HYDROXYZINE 50 MG/1
100 TABLET, FILM COATED ORAL
Status: DISCONTINUED | OUTPATIENT
Start: 2023-09-25 | End: 2023-09-30 | Stop reason: HOSPADM

## 2023-09-25 RX ORDER — LORAZEPAM 2 MG/ML
2 INJECTION INTRAMUSCULAR EVERY 6 HOURS PRN
Status: DISCONTINUED | OUTPATIENT
Start: 2023-09-25 | End: 2023-09-30 | Stop reason: HOSPADM

## 2023-09-25 RX ORDER — AMOXICILLIN 250 MG
1 CAPSULE ORAL DAILY PRN
Status: DISCONTINUED | OUTPATIENT
Start: 2023-09-25 | End: 2023-09-30 | Stop reason: HOSPADM

## 2023-09-25 RX ORDER — ACETAMINOPHEN 325 MG/1
650 TABLET ORAL EVERY 6 HOURS PRN
Status: DISCONTINUED | OUTPATIENT
Start: 2023-09-25 | End: 2023-09-30 | Stop reason: HOSPADM

## 2023-09-25 RX ORDER — ACETAMINOPHEN 325 MG/1
975 TABLET ORAL EVERY 6 HOURS PRN
Status: DISCONTINUED | OUTPATIENT
Start: 2023-09-25 | End: 2023-09-30 | Stop reason: HOSPADM

## 2023-09-25 RX ORDER — LORAZEPAM 2 MG/ML
2 INJECTION INTRAMUSCULAR
Status: DISCONTINUED | OUTPATIENT
Start: 2023-09-25 | End: 2023-09-30 | Stop reason: HOSPADM

## 2023-09-25 RX ORDER — HALOPERIDOL 5 MG/1
5 TABLET ORAL
Status: DISCONTINUED | OUTPATIENT
Start: 2023-09-25 | End: 2023-09-26

## 2023-09-25 RX ORDER — HYDROXYZINE HYDROCHLORIDE 25 MG/1
25 TABLET, FILM COATED ORAL
Status: DISCONTINUED | OUTPATIENT
Start: 2023-09-25 | End: 2023-09-30 | Stop reason: HOSPADM

## 2023-09-25 RX ORDER — HYDROXYZINE 50 MG/1
50 TABLET, FILM COATED ORAL
Status: DISCONTINUED | OUTPATIENT
Start: 2023-09-25 | End: 2023-09-30 | Stop reason: HOSPADM

## 2023-09-25 RX ORDER — BENZTROPINE MESYLATE 1 MG/1
1 TABLET ORAL
Status: DISCONTINUED | OUTPATIENT
Start: 2023-09-25 | End: 2023-09-30 | Stop reason: HOSPADM

## 2023-09-25 RX ORDER — OLANZAPINE 2.5 MG/1
5 TABLET ORAL DAILY
Status: DISCONTINUED | OUTPATIENT
Start: 2023-09-25 | End: 2023-09-25 | Stop reason: HOSPADM

## 2023-09-25 RX ORDER — HALOPERIDOL 5 MG/1
2.5 TABLET ORAL
Status: DISCONTINUED | OUTPATIENT
Start: 2023-09-25 | End: 2023-09-26

## 2023-09-25 RX ORDER — DIPHENHYDRAMINE HYDROCHLORIDE 50 MG/ML
50 INJECTION INTRAMUSCULAR; INTRAVENOUS EVERY 6 HOURS PRN
Status: DISCONTINUED | OUTPATIENT
Start: 2023-09-25 | End: 2023-09-30 | Stop reason: HOSPADM

## 2023-09-25 RX ORDER — BENZTROPINE MESYLATE 1 MG/ML
1 INJECTION INTRAMUSCULAR; INTRAVENOUS
Status: DISCONTINUED | OUTPATIENT
Start: 2023-09-25 | End: 2023-09-30 | Stop reason: HOSPADM

## 2023-09-25 RX ORDER — HALOPERIDOL 5 MG/ML
2.5 INJECTION INTRAMUSCULAR
Status: DISCONTINUED | OUTPATIENT
Start: 2023-09-25 | End: 2023-09-26

## 2023-09-25 RX ORDER — LORAZEPAM 2 MG/ML
1 INJECTION INTRAMUSCULAR
Status: DISCONTINUED | OUTPATIENT
Start: 2023-09-25 | End: 2023-09-30 | Stop reason: HOSPADM

## 2023-09-25 RX ADMIN — OLANZAPINE 5 MG: 2.5 TABLET, FILM COATED ORAL at 12:54

## 2023-09-25 NOTE — CONSULTS
Consultation - [address] [place] 26 y.o. female MRN: [id number]  Unit/Bed#: [address] 02 Encounter: [id number]      Chief Complaint: "I didn't mean to come"    History of Present Illness   Physician Requesting Consult: Juan Manuel Bowers MD  Reason for Consult / Principal Problem: Dx 1. Schizoaffective disorder ([address])    Ashley Cortés is a 26 y.o. female with past medical history of anemia, psychosis who presented to the ED with insomnia. Per ED documentation patient brought to the emergency department for mental health evaluation regarding ongoing issues with insomnia/depression. 302 had been initiated and upheld and subsequently patient signed 201. Patient reports that prior to presentation she was having a "hell situation". States that her "terms" for which she was experiencing are "not psychiatric but spiritual". She states that she has had difficulty being able to "move forward". She reports struggling regarding with employment. She describes her "hell" as "oppression of the devil" and was speaking about "evil". Patient is religiously preoccupied. She is tangential and exhibits thought blocking at times. Reports that she has "no place to go" and recounted her history of being in [place] and stating that she had also been to [facility] and had went to "the [facility] for help". She reports that after she received her masters degree in [place] that she had employment and that it had a "collapsed". She denies feeling depressed stating she "does not call it by medical terms" and states rather that she has "strong negative emotions". She reports decreased sleep, energy, appetite, interest, concentration. Reports that she spends her time alone. She denies suicidal or homicidal ideation, plan, or intent. She does not endorse current or previous episodes of tika/hypomania. She denies auditory hallucinations.   When asked if she feels that anyone or anything is tried to hurt or harm her she stated "anyone or anything" and did not answer. Patient was perseverative on her situation stating that there "does not seem to be a solution". She denies following with a psychiatrist and denies taking psychiatric medication. Psychiatric Review Of Systems:  sleep: yes  appetite changes: yes  weight changes: none reported  energy/anergy: yes  interest/pleasure/anhedonia: yes  somatic symptoms: no  anxiety/panic: no  tika: no    Historical Information   Past Psychiatric History:   Patient had a previous psychiatric hospitalization at [facility] in 7[facility], reports also undergoing inpatient psychiatric treatment in [place]  Currently in treatment with - none. Past Suicide attempts: Denies  Past Violent behavior: Denies  Past Psychiatric medication trial: Zyprexa, melatonin, clonazepam    Substance Abuse History:   Patient denies drug or alcohol use     I have assessed this patient for substance use within the past 12 months    History of IP/OP rehabilitation program: Denies  Smoking history: denies    Family Psychiatric History:   Denies family history of mental illness. Denies family history of suicide attempts or completions    Social History  Education: Reports masters degree in economics and politics in 2015  Learning Disabilities:   Marital history: single  Living arrangement, social support: With parents.   Occupational History: Unemployed  Functioning Relationships: Reports that she has "no support"  Other Pertinent History: None reported    Traumatic History:   Abuse: Denies  Other Traumatic Events: Denies    Past Medical History:   Diagnosis Date   • Anxiety    • Depression    • Fibroids 07/25/2022   • Iron deficiency anemia due to chronic blood loss 07/25/2022   • Sleep difficulties        Medical Review Of Systems:  Review of Systems -all systems reviewed and are negative    Meds/Allergies   all current active meds have been reviewed and current meds:   Current Facility-Administered Medications Medication Dose Route Frequency   • OLANZapine (ZyPREXA) tablet 5 mg  5 mg Oral Daily     Allergies   Allergen Reactions   • Penicillins Other (See Comments)     Reaction Date: 21Jul2008; Reaction Date: 21Jul2008;      • Augmentin [Amoxicillin-Pot Clavulanate] Rash       Objective   Vital signs in last 24 hours:       No intake or output data in the 24 hours ending 09/25/23 1244    Mental Status Evaluation:  Appearance:  appears stated age   Behavior:  calm and cooperative   Speech:  normal rate and normal volume   Mood:   "Strong negative emotions"   Affect:  Constricted   Language: naming objects and repeating phrases   Thought Process: Tangential, thought blocking at times   Associations: intact associations   Thought Content:  Persecutory delusions   Perceptual Disturbances: Denies auditory or visual hallucinations and Appears to be internally preoccupied   Risk Potential:  Denies auditory or visual hallucinations   Sensorium:  person, place and situation   Memory:  recent and remote memory grossly intact   Cognition:  recent and remote memory grossly intact   Consciousness:  alert and awake    Attention: attention span and concentration were age appropriate   Intellect: within normal limits   Fund of Knowledge: awareness of current events: fair, past history: fair and vocabulary: fair   Insight:  limited   Judgment: limited   Muscle Strength and Tone: Not assessed   Gait/Station: Not assessed, patient in bed   Motor Activity: no abnormal movements     Lab Results:    I have personally reviewed all pertinent laboratory/tests results.     Drug Screen:   Lab Results   Component Value Date    AMPMETHUR Negative 09/24/2023    BARBTUR Negative 09/24/2023    BDZUR Negative 09/24/2023    THCUR Negative 09/24/2023    COCAINEUR Negative 09/24/2023    METHADONEUR Negative 07/25/2022    OPIATEUR Negative 09/24/2023    PCPUR Negative 09/24/2023     Ext Breath Alcohol   Lab Results   Component Value Date    BREATHALC 0.000 09/24/2023       Code Status: )Prior    Assessment/Plan     Assessment:  Nemo Groves is a 26 y.o. female with past medical history of anemia, psychosis who presented to the ED with insomnia. Per ED documentation patient brought to the emergency department for mental health evaluation regarding ongoing issues with insomnia/depression. 302 had been initiated and upheld and subsequently patient signed 201. Patient reports experiencing "strong negative emotions" and endorses neurovegetative symptomatology of depression. She is also psychotic and verbalized persecutory delusions and is religiously preoccupied. She reports having difficulty with functioning, particularly regarding employment. She denies suicidal or homicidal ideation, plan, or intent. Does not endorse current or previous symptoms of tika/hypomania. Denies auditory or visual hallucinations. She denies following with a psychiatrist and denies taking psychiatric medication. Patient signed 12 for voluntary inpatient psychiatric commitment. Patient has been accepted at [address] [place] [address] behavioral health unit. Diagnosis:  Psychosis unspecified  Depressive disorder unspecified  Differential diagnoses include schizoaffective disorder depressive type versus major depressive disorder with psychotic features    Plan:   • Continue one-to-one observation  • Patient signed 12 for voluntary inpatient psychiatric admission  • Patient has been accepted at [address] inpatient behavioral health unit  • Zyprexa 5 mg had been ordered and given to patient today. Will defer psychiatric medication changes to inpatient psychiatric attending  • Discussed with the ED physician  • I will follow-up with patient if she continues to be in the emergency department tomorrow.   For overnights and weekends please contact [facility] for psychiatric purposes    Risks, benefits and possible side effects of Medications:   No medications recommended by me       Kary Jarrett, DO  09/25/23

## 2023-09-25 NOTE — NURSING NOTE
Cesar scored "Moderate Risk" on Lifetime C-SSRS. Star Lizama PA-C notified via [facility] Text. No new orders obtained at this time. Sharmon Boxer is currently "Low Risk," denies active SI and endorses feeling of safety on the unit. Observational rounds enacted for promotion of patient safety at this time.

## 2023-09-25 NOTE — NURSING NOTE
Bin   No cellphone or wallet/purse     Sneakers gray and orange     Chap stick       Bedside   [employer] Underwear   Orange flower dress   Sports bra   White socks   Sweater black

## 2023-09-25 NOTE — ED NOTES
Patient resting quietly in bed, states she feels very good after being able to sleep for a few hours. Fresh cup of water provided.       Nolan Marie, [address]  09/25/23 6284
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
## 2023-09-25 NOTE — PLAN OF CARE
RN will meet with patient at least twice per day to assess for any concerns. Teach about prescribed medication and diagnosis. Patient will be taught and encouraged to utilize healthy coping skills.

## 2023-09-25 NOTE — PLAN OF CARE
Problem: Depression  Goal: Verbalize thoughts and feelings  Description: Interventions:  - Assess and re-assess patient's level of risk   - Engage patient in 1:1 interactions, daily, for a minimum of 15 minutes   - Encourage patient to express feelings, fears, frustrations, hopes   Outcome: Progressing     Problem: Anxiety  Goal: Anxiety is at manageable level  Description: Interventions:  - Assess and monitor patient's anxiety level. - Monitor for signs and symptoms (heart palpitations, chest pain, shortness of breath, headaches, nausea, feeling jumpy, restlessness, irritable, apprehensive). - Collaborate with interdisciplinary team and initiate plan and interventions as ordered.   - [place] patient to unit/surroundings  - Explain treatment plan  - Encourage participation in care  - Encourage verbalization of concerns/fears  - Identify coping mechanisms  - Assist in developing anxiety-reducing skills  - Administer/offer alternative therapies  - Limit or eliminate stimulants  Outcome: Progressing

## 2023-09-25 NOTE — ED NOTES
Follow-up from Intake. Spoke with Shar Gurrola. Patient accepted at [address]. Transport being requested a pickup time of 1330 or later. Patient refused to sign EMTALA with [facility], she was withdrawn, tearful, paranoid, referenced the devil. States that she no longer wants to be a slave to this country and is unsure if she can be helped. Insight poor. Roundtrip ordered for BLS transport. Patient remains on 201. Psych cx has been ordered and is pending.

## 2023-09-25 NOTE — EMTALA/ACUTE CARE TRANSFER
[facility] EMERGENCY DEPARTMENT  [address] [facility] [address]  Dept: [phone]      EMTALA TRANSFER CONSENT    NAME Analisa LOU 1990                              MRN [id number]    I have been informed of my rights regarding examination, treatment, and transfer   by Dr. Allan Rascon MD    Benefits: Specialized equipment and/or services available at the receiving facility (Include comment)________________________    Risks: Potential for delay in receiving treatment, Potential deterioration of medical condition, Increased discomfort during transfer, Possible worsening of condition or death during transfer      Consent for Transfer:  I acknowledge that my medical condition has been evaluated and explained to me by the emergency department physician or other qualified medical person and/or my attending physician, who has recommended that I be transferred to the service of  Accepting Physician: Dr. Alexis Jacobs at [address] [place] [address] [address] Name, [address] [place] [address] Street : [address] [place] [address]. The above potential benefits of such transfer, the potential risks associated with such transfer, and the probable risks of not being transferred have been explained to me, and I fully understand them. The doctor has explained that, in my case, the benefits of transfer outweigh the risks. I agree to be transferred. I authorize the performance of emergency medical procedures and treatments upon me in both transit and upon arrival at the receiving facility. Additionally, I authorize the release of any and all medical records to the receiving facility and request they be transported with me, if possible. I understand that the safest mode of transportation during a medical emergency is an ambulance and that the Hospital advocates the use of this mode of transport.  Risks of traveling to the receiving facility by car, including absence of medical control, life sustaining equipment, such as oxygen, and medical personnel has been explained to me and I fully understand them. (SALVADOR CORRECT BOX BELOW)  [  ]  I consent to the stated transfer and to be transported by ambulance/helicopter. [  ]  I consent to the stated transfer, but refuse transportation by ambulance and accept full responsibility for my transportation by car. I understand the risks of non-ambulance transfers and I exonerate the Hospital and its staff from any deterioration in my condition that results from this refusal.    X___________________________________________    DATE  23  TIME________  Signature of patient or legally responsible individual signing on patient behalf           RELATIONSHIP TO PATIENT_________________________          Provider Certification    NAME Triston May                                         1990                              MRN [id number]    A medical screening exam was performed on the above named patient. Based on the examination:    Condition Necessitating Transfer The encounter diagnosis was Schizoaffective disorder ([address]).     Patient Condition: The patient has been stabilized such that within reasonable medical probability, no material deterioration of the patient condition or the condition of the unborn child(winston) is likely to result from the transfer    Reason for Transfer: Level of Care needed not available at this facility    Transfer Requirements: [address], [place]   · Space available and qualified personnel available for treatment as acknowledged by    · Agreed to accept transfer and to provide appropriate medical treatment as acknowledged by       Dr. Bandar Julien  · Appropriate medical records of the examination and treatment of the patient are provided at the time of transfer   [address] _______  · Transfer will be performed by qualified personnel from    and appropriate transfer equipment as required, including the use of necessary and appropriate life support measures. Provider Certification: I have examined the patient and explained the following risks and benefits of being transferred/refusing transfer to the patient/family:  General risk, such as traffic hazards, adverse weather conditions, rough terrain or turbulence, possible failure of equipment (including vehicle or aircraft), or consequences of actions of persons outside the control of the transport personnel, Unanticipated needs of medical equipment and personnel during transport, Risk of worsening condition, The possibility of a transport vehicle being unavailable, The patient is stable for psychiatric transfer because they are medically stable, and is protected from harming him/herself or others during transport      Based on these reasonable risks and benefits to the patient and/or the unborn child(winston), and based upon the information available at the time of the patient’s examination, I certify that the medical benefits reasonably to be expected from the provision of appropriate medical treatments at another medical facility outweigh the increasing risks, if any, to the individual’s medical condition, and in the case of labor to the unborn child, from effecting the transfer.     X____________________________________________ DATE 09/25/23        TIME_______      ORIGINAL - SEND TO MEDICAL RECORDS   COPY - SEND WITH PATIENT DURING TRANSFER

## 2023-09-25 NOTE — ED NOTES
Psychiatry evaluated. Patient agreeable to remaining on 201. EMTALA signed. Addressed all questions. Prepped transfer packet and placed it on the chart.

## 2023-09-25 NOTE — ED NOTES
Chart reviewed. Accepted to [facility] but Vaibhav Clark is on hold for admissions currently. [facility] followed up with [facility] Intake and Ambar Romero advised that all admissions to the unit remain TBD. Intake will follow up with [facility].

## 2023-09-25 NOTE — ED CARE HANDOFF
Emergency Department Sign Out Note        Sign out and transfer of care from Dr. Solitario Dickinson. See Separate Emergency Department note. The patient, Lise Doty, was evaluated by the previous provider for increasing depression. 201 in place. Accepted to [facility]. Pending transport this morning. Workup Completed:  Labs Reviewed   POCT ALCOHOL BREATH TEST - Normal       Result Value Ref Range Status    EXTBreath Alcohol 0.000   Final   POCT PREGNANCY, URINE - Normal    EXT Preg Test, Ur Negative   Final    Control        RAPID DRUG SCREEN, URINE    Amph/Meth UR Negative  Negative Final    Barbiturate Ur Negative  Negative Final    Benzodiazepine Urine Negative  Negative Final    Cocaine Urine Negative  Negative Final    Methadone Urine     Final    Opiate Urine Negative  Negative Final    PCP Ur Negative  Negative Final    THC Urine Negative  Negative Final    Oxycodone Urine Negative  Negative Final    Narrative:     No methadone test performed; if required call laboratory. FOR MEDICAL PURPOSES ONLY. IF CONFIRMATION NEEDED PLEASE CONTACT THE LAB WITHIN 5 DAYS. Drug Screen Cutoff Levels:  AMPHETAMINE/METHAMPHETAMINES  1000 ng/mL  BARBITURATES     200 ng/mL  BENZODIAZEPINES     200 ng/mL  COCAINE      300 ng/mL  METHADONE      300 ng/mL  OPIATES      300 ng/mL  PHENCYCLIDINE     25 ng/mL  THC       50 ng/mL  OXYCODONE      100 ng/mL       ED Course / Workup Pending (followup):      ED Course as of 09/25/23 1559   Mon Sep 25, 2023   0705 SO: increasing depression. No SI or HI. 201 in place. To SLQ this AM.   1145 Discussed with Skip Calderón from psych. Recommends starting patient on zyprexa 5 mg daily. Procedures  Medical Decision Making  78-year-old female previously evaluated for psychiatric evaluation. Patient with increasing depression. 201 in place. Signed out to me pending transport. Accepted to [facility]. Transported in stable condition.     Schizoaffective disorder ([address]): acute illness or injury  Amount and/or Complexity of Data Reviewed  Labs: ordered. Risk  Prescription drug management. Decision regarding hospitalization. Disposition  Final diagnoses:   Schizoaffective disorder ([address])     Time reflects when diagnosis was documented in both MDM as applicable and the Disposition within this note     Time User Action Codes Description Comment    9/24/2023  1:21 PM Osvaldo Ordonez Add [F25.9] Schizoaffective disorder [facility])       ED Disposition     ED Disposition   Transfer to Behavioral Health    Condition   --    Date/Time   Mon Sep 25, 2023  7:13 AM    Comment   Greer Lemos should be transferred out to mental health, inpatient behavioral health at [facility], and has been medically cleared.            MD Documentation    Justin Frazier Most Recent Value   Patient Condition The patient has been stabilized such that within reasonable medical probability, no material deterioration of the patient condition or the condition of the unborn child(winston) is likely to result from the transfer   Reason for Transfer Level of Care needed not available at this facility   Benefits of Transfer Specialized equipment and/or services available at the receiving facility (Include comment)________________________   Risks of Transfer Potential for delay in receiving treatment, Potential deterioration of medical condition, Increased discomfort during transfer, Possible worsening of condition or death during transfer   Accepting Physician Dr. Alexander Pritchett Name, [facility] MD Dr. Cedrick Cohen   Provider Certification General risk, such as traffic hazards, adverse weather conditions, rough terrain or turbulence, possible failure of equipment (including vehicle or aircraft), or consequences of actions of persons outside the control of the transport personnel, Unanticipated needs of medical equipment and personnel during transport, Risk of worsening condition, The possibility of a transport vehicle being unavailable, The patient is stable for psychiatric transfer because they are medically stable, and is protected from harming him/herself or others during transport      RN Documentation    [address] Name, [address] [place] [address]   Level of Care Other (Comment)      Follow-up Information    None       Discharge Medication List as of 9/25/2023  3:37 PM      CONTINUE these medications which have NOT CHANGED    Details   clonazePAM (KlonoPIN) 0.5 mg tablet Take 1 tablet (0.5 mg total) by mouth daily as needed for anxiety for up to 5 doses Do not start before June 14, 2023., Starting Wed 6/14/2023, Normal      docusate sodium (COLACE) 100 mg capsule Take 1 capsule (100 mg total) by mouth 2 (two) times a day, Starting Thu 8/4/2022, Until Sat 9/3/2022, Normal      ferrous sulfate 325 (65 Fe) mg tablet Take 1 tablet (325 mg total) by mouth daily with breakfast, Starting Fri 8/5/2022, Until Sun 9/4/2022, Normal      OLANZapine (ZyPREXA) 10 mg tablet Take 1 tablet (10 mg total) by mouth daily at bedtime, Starting Thu 8/4/2022, Until Sat 9/3/2022, Normal           No discharge procedures on file.        ED Provider  Electronically Signed by     Evelyn Fuentes MD  09/25/23 1010

## 2023-09-26 PROBLEM — F39 MOOD DISORDER (HCC): Status: ACTIVE | Noted: 2023-09-26

## 2023-09-26 PROCEDURE — 99253 IP/OBS CNSLTJ NEW/EST LOW 45: CPT

## 2023-09-26 PROCEDURE — 99223 1ST HOSP IP/OBS HIGH 75: CPT | Performed by: PSYCHIATRY & NEUROLOGY

## 2023-09-26 RX ORDER — OLANZAPINE 10 MG/1
10 TABLET ORAL EVERY 8 HOURS PRN
Status: DISCONTINUED | OUTPATIENT
Start: 2023-09-26 | End: 2023-09-30 | Stop reason: HOSPADM

## 2023-09-26 RX ORDER — DIVALPROEX SODIUM 500 MG/1
500 TABLET, EXTENDED RELEASE ORAL DAILY
Status: DISCONTINUED | OUTPATIENT
Start: 2023-09-26 | End: 2023-09-30 | Stop reason: HOSPADM

## 2023-09-26 RX ORDER — OLANZAPINE 10 MG/1
10 INJECTION, POWDER, LYOPHILIZED, FOR SOLUTION INTRAMUSCULAR EVERY 8 HOURS PRN
Status: DISCONTINUED | OUTPATIENT
Start: 2023-09-26 | End: 2023-09-30 | Stop reason: HOSPADM

## 2023-09-26 RX ORDER — OLANZAPINE 5 MG/1
5 TABLET ORAL EVERY 6 HOURS PRN
Status: DISCONTINUED | OUTPATIENT
Start: 2023-09-26 | End: 2023-09-30 | Stop reason: HOSPADM

## 2023-09-26 RX ORDER — OLANZAPINE 2.5 MG/1
2.5 TABLET ORAL
Status: DISCONTINUED | OUTPATIENT
Start: 2023-09-26 | End: 2023-09-30 | Stop reason: HOSPADM

## 2023-09-26 RX ADMIN — DIVALPROEX SODIUM 500 MG: 500 TABLET, EXTENDED RELEASE ORAL at 09:55

## 2023-09-26 RX ADMIN — OLANZAPINE 5 MG: 5 TABLET, FILM COATED ORAL at 20:10

## 2023-09-26 NOTE — NURSING NOTE
Patient refused vital signs today, breathing appears regular and even, no complaints of pain.  Patient did eat lunch

## 2023-09-26 NOTE — ASSESSMENT & PLAN NOTE
• Admission labs pending: CBC, CMP, RPR, HbA1c, lipid panel, TSH  • Vitals stable   • UDS negative  • COVID-19 negative  • EKG pending  • Medically stable for continued inpatient psychiatric treatment based on available results

## 2023-09-26 NOTE — NURSING NOTE
Patient observed on the phone with her parents, yelling and crying. Pt ended call and went to her room and continued screaming. Peer RN directed pt to consult room to give her space to herself to calm down, pt continued screaming and crying. This writer intervened, pt was wringing her hands, then grabbing tightly to her scalp and hair and rocking back and forth, at times, mumbling to herself. RN prompted pt mx times to talk about what was going on, pt disinterested, then rambled suddenly about "too much evil, too many bad things, and all the witch craft." Pt then went on about feeling "useless and hopeless, and not wanting to live." RN asked pt if she had a plan to harm herself and she denied, then said "I am done talking to you, I need a snack, I have already said too much." Pt was offered saltines and a PRN, however declined, stating, "I don't need to be drugged." RN asked pt about coping skills, she said "apparently I have none." Provider made aware of pt current condition.

## 2023-09-26 NOTE — NURSING NOTE
Patient on telephone loudly stating she does not want to be here tomorrow, she is stating she wants to die

## 2023-09-26 NOTE — PROGRESS NOTES
Status: Pt is 201 from [facility], who was brought in by her family due to decreased sleep & appetite & increased depression. Pt's family reported that Pt has becoming aggressive with her mother, pushing & yelling at her. Family reported that Pt will work & save money to go to [place], & then when she runs out of money, she comes back to the [facility]. The last time she went to [place] she lost her passport & her father had to get her a new one, so she could return. Pt scant & guarded upon arrival to the unit. Pt is an elopement risk & had broken sleep overnight. Reviewed readmit score: 27(RED), AUDIT: 0, PAWSS: 0, BAT: 0, UDS: negative  Medication: to be reviewed / no PRNs  D/C: TBD / new admission      09/26/23 0750   Team Meeting   Meeting Type Daily Rounds   Team Members Present   Team Members Present Physician;Nurse; Other (Discipline and Name);    Physician Team Member Dr. Sreekanth Fuentes / Dr. Errol Enriquez / Heladio Edmonds / Trupti Araujo Team Member Shira Reyez / Alesha Wall / [facility] Management Team Member Olga Gardner / Kishor Lozano / Honey Monterroso   Other (Discipline and Name) Pb(art therapy) Thania(group facilitator)   Patient/Family Present   Patient Present No   Patient's Family Present No

## 2023-09-26 NOTE — CASE MANAGEMENT
CM met with Pt, who was resting in bed. CM asked if Pt would be up to meeting with her, but Pt reported she doesn't have must of an idea of about life at this time. CM reviewed that she had worked with Pt previously & reviewed that at that time, she was living at home but chose not to go there, but rather go to the [employer] in [facility]. Pt said that she did go, however, they did not have much to offer besides food, so she ended up calling her sister & went to stay with her for a few weeks, before ultimately returning to her parent's home. CM reviewed that Pt was to start a new job & Pt said that she did it for 2 weeks and then quit. Pt said that she moved to [facility] to try to start life there, but said that it did not work out & she returned to her parents in May. Pt said that she cannot live there. Pt said that she always had lofty goals and does not feel those are achievable. CM spoke to Pt about resources like [facility] but Pt said that no one, including herself are delusional enough to think her working at [facility] would be a life choice for her. Pt appeared on the edge of tears and said that she is Zoroastrian and shelter is not an option nor is other things. CM agreed that they could talk more at at another time.

## 2023-09-26 NOTE — CASE MANAGEMENT
Readmit score:  27(RED)   Confirmed Address:    [place]: [address] [place] [address] ([place]), [place]   Resides in the home with:   Pt was living with her parents, but said that she cannot live there, that it's horrible there and she is a Zoroastrian. Will Return Home at Discharge: Pt said no   Confirmed Phone Number: None  (home) [phone]   Confirmed Email Address: None    Marital Status:      Children: Single    None   Family History:                        Parents:                       Siblings: Pt denied. Pt reported her parents are evil. Pt has a sister. Commitment Status:  201   Admitted from:   [facility] on 9/24/2023 @ 1023   Presenting C/O:     Pt stating she didn't need to be here, and didn't deserve this. Pt stating there is nothing CM can help her with. Past Inpatient Tx:   STLQ: July 28 to August 5, 2022   Past Suicide Attempts: Pt denied   Current outpatient:    Psychiatrist:   None   Therapist:   None   ACT/ICM/CPS/WRT/SC: None   PCP:   [facility] - Dr. Bala Young. [phone]   Med Hx/Concern:   Pt denied   Medications:   Pt said she was not taking medication and it wasn't necessary. Pharmacy:   [facility] or [facility]   Spirituality/Moravian: Pt reported she is Jose A. Education:   Pt has a masters degree in [address] [place] [address] from [employer]; she reported she went to [employer] for [employer]. Work/Income:     Pt is not currently working. Pt said that she did start the work from home job after discharge, but only did it for 2 weeks. Pt said that she then went to [place] but that didn't work either. Legal:    Pt denied   Access to Firearms: Pt denied   Transportation:   Pt has a license, but no car. Referrals Needed: Pt declined any referrals at this time.     Transport at Discharge: TBD   IMM: N/A Mj Text EMILIANO: Pt declined   Emergency Contact:  Julissa May(younger sister): [phone]   ROIs obtained:   None Insurance:    [facility]   Audit: 0       PAWSS: 0   BAT: 0 UDS: negative     Substance Abuse: Freq.  Amount Last Use Notes:   Heroin       Amp/Meth       Alcohol       Cocaine       Cannabis       Benzodiazepine       Barbituartes       Other       Tobacco

## 2023-09-26 NOTE — TREATMENT PLAN
TREATMENT PLAN REVIEW - [facility] Jannie Burgess 26 y.o. 1990 female MRN: [id number]    Nancy Brody Room / Bed: [place] [address]/[facility] [address] Encounter: [id number]          Admit Date/Time:  9/25/2023  4:18 PM    Treatment Team: Attending Provider: [place]i River DO; Patient Care Technician: Sueellen Ganser; Patient Care Assistant: Pradip Anderson; Charge Nurse: Carlos Manuel Roberto RN; Care Manager: Danilo Hector, RN; Security: Chintan Precise; Registered Nurse: Tomas Hill, RN; Registered Nurse: Lulú Worthington, RN; Registered Nurse: Jairo Kapadia, CHRISTINE; : Sam Kessler;  Occupational Therapy Assistant: Lynette Martin IV    Diagnosis: Principal Problem:    Mood disorder ([address])      Patient Strengths/Assets: good physical health    Patient Barriers/Limitations: noncompliant with medication, noncompliant with treatment    Short Term Goals: ability to stay safe on the unit, improvement in insight    Long Term Goals: free of suicidal thoughts, free of homicidal thoughts, improvement in reality testing    Progress Towards Goals: starting psychiatric medications as prescribed, improving gradually    Recommended Treatment: medication management, patient medication education, group therapy, milieu therapy, continued Behavioral Health psychiatric evaluation/assessment process    Treatment Frequency: daily medication monitoring, group and milieu therapy daily, monitoring through interdisciplinary rounds, monitoring through weekly patient care conferences    Expected Discharge Date:  7 days    Discharge Plan: discharge to home    Treatment Plan Created/Updated By: Fifi River DO

## 2023-09-26 NOTE — NURSING NOTE
Patient observed resting with eyes closed throughout the night, with brief period of wakefulness observed from approximately 03:15 until 03:45. Patient still resting now. Observational rounds maintained for promotion of patient safety.

## 2023-09-26 NOTE — NURSING NOTE
Patient is tearful when spoken with, she states that she does not feel the father wants her to take the medication, she does not feel that anything can help her. With much encouragement patient sat up and took her medication I stayed with patient after she swallowed her medication. She could not remember if she ate breakfast or not, she also told me that she did not want to tell me anything about herself.   Patient appeared very anxious offered medication for anxiety patient declined

## 2023-09-26 NOTE — CONSULTS
[address] [facility]  Consult  Name: Tim Camp 26 y.o. female I MRN: [id number]  Unit/Bed#: [facility] 172-45 I Date of Admission: 9/25/2023   Date of Service: 9/26/2023 I Hospital Day: 1    Inpatient consult for Medical Clearance for [facility] patient  Consult performed by: Alfa Tomas PA-C  Consult ordered by: Reese Giang MD          Assessment/Plan   Medical clearance for psychiatric admission  Assessment & Plan  • Admission labs pending: CBC, CMP, RPR, HbA1c, lipid panel, TSH  • Vitals stable   • UDS negative  • COVID-19 negative  • EKG pending  • Medically stable for continued inpatient psychiatric treatment based on available results           Counseling / Coordination of Care Time: 30 minutes. Greater than 50% of total time spent on patient counseling and coordination of care. Collaboration of Care: Were Recommendations Directly Discussed with Primary Treatment Team? - No     History of Present Illness:    Tim Camp is a 26 y.o. female with PMH of MDD, prior psychiatric admission who is originally admitted to the psychiatry service due to worsening anxiety/depression and insomnia. We are consulted for medical clearance for admission to [facility] and treatment of underlying psychiatric illness. Discussed patient's medical history, patient with poor engagement and attention to interview. She denies taking daily medications & denies any known medical conditions. Patient denies alcohol, drug, or tobacco use. Patient denies any physical complaints currently, including headache, dizziness, cough/congestion, chest pain, SOB, abdomina/urinary symptoms, rashes or wounds. Patient states "I don't belong here. I don't need meds.". Patient is without additional complaints. Labs pending, vitals stable. Based on all available data patient appears medically stable to continue inpatient psychiatric treatment.      Review of Systems:    Review of Systems   Constitutional: Negative for chills and fever. HENT: Negative for congestion, rhinorrhea and sore throat. Eyes: Negative for visual disturbance. Respiratory: Negative for cough, chest tightness, shortness of breath and wheezing. Cardiovascular: Negative for chest pain and palpitations. Gastrointestinal: Negative for abdominal pain, constipation, diarrhea, nausea and vomiting. Genitourinary: Negative for difficulty urinating, dysuria, frequency and urgency. Musculoskeletal: Negative for arthralgias, back pain and myalgias. Skin: Negative for rash and wound. Neurological: Negative for dizziness, light-headedness and headaches. Psychiatric/Behavioral: Positive for dysphoric mood and sleep disturbance. All other systems reviewed and are negative. Past Medical and Surgical History:     Past Medical History:   Diagnosis Date   • Anxiety    • Depression    • Fibroids 07/25/2022   • Iron deficiency anemia due to chronic blood loss 07/25/2022   • Sleep difficulties        No past surgical history on file. Meds/Allergies:    PTA meds:   Prior to Admission Medications   Prescriptions Last Dose Informant Patient Reported? Taking? OLANZapine (ZyPREXA) 10 mg tablet   No No   Sig: Take 1 tablet (10 mg total) by mouth daily at bedtime   clonazePAM (KlonoPIN) 0.5 mg tablet   No No   Sig: Take 1 tablet (0.5 mg total) by mouth daily as needed for anxiety for up to 5 doses Do not start before June 14, 2023. Patient not taking: Reported on 9/24/2023   docusate sodium (COLACE) 100 mg capsule   No No   Sig: Take 1 capsule (100 mg total) by mouth 2 (two) times a day   ferrous sulfate 325 (65 Fe) mg tablet   No No   Sig: Take 1 tablet (325 mg total) by mouth daily with breakfast      Facility-Administered Medications: None       Allergies: Allergies   Allergen Reactions   • Penicillins Other (See Comments)     Reaction Date: 21Jul2008;    Reaction Date: 21Jul2008;      • Augmentin [Amoxicillin-Pot Clavulanate] Rash Social History:     Marital Status: Single    Substance Use History:   Social History     Substance and Sexual Activity   Alcohol Use Not Currently    Comment: Denies     Social History     Tobacco Use   Smoking Status Never   • Passive exposure: Never   Smokeless Tobacco Never   Tobacco Comments    N/A, patient is a non-smoker. Social History     Substance and Sexual Activity   Drug Use Never       Family History:    History reviewed. No pertinent family history. Physical Exam:     Vitals:   Blood Pressure: 106/79 (09/25/23 1634)  Pulse: 69 (09/25/23 1634)  Temperature: 97.6 °F (36.4 °C) (09/25/23 1634)  Temp Source: Tympanic (09/25/23 1634)  Respirations: 14 (09/25/23 1634)  Height: 5' 4" (162.6 cm) (09/25/23 1634)  Weight - Scale: 61.5 kg (135 lb 9.6 oz) (09/25/23 1634)  SpO2: 98 % (09/25/23 1634)    Physical Exam  Vitals and nursing note reviewed. Constitutional:       General: She is not in acute distress. Appearance: She is not ill-appearing. HENT:      Head: Normocephalic and atraumatic. Nose: Nose normal.   Eyes:      General:         Right eye: No discharge. Left eye: No discharge. Extraocular Movements: Extraocular movements intact. Conjunctiva/sclera: Conjunctivae normal.   Cardiovascular:      Rate and Rhythm: Normal rate and regular rhythm. Heart sounds: Normal heart sounds. No murmur heard. Pulmonary:      Effort: Pulmonary effort is normal. No respiratory distress. Breath sounds: Normal breath sounds. No wheezing, rhonchi or rales. Abdominal:      General: Bowel sounds are normal.      Palpations: Abdomen is soft. Tenderness: There is no abdominal tenderness. There is no guarding. Musculoskeletal:      Right lower leg: No edema. Left lower leg: No edema. Skin:     General: Skin is warm and dry. Neurological:      General: No focal deficit present. Mental Status: She is alert and oriented to person, place, and time. Cranial Nerves: No cranial nerve deficit. Psychiatric:      Comments: Flat affect + poor engagement         Additional Data:     Lab Results: I have personally reviewed pertinent reports. Lab Results   Component Value Date/Time    HGBA1C 4.5 07/03/2023 10:58 AM    HGBA1C 4.9 08/01/2022 06:50 AM           EKG, Pathology, and Other Studies Reviewed on Admission:   · EKG pending    ** Please Note: This note has been constructed using a voice recognition system.  **

## 2023-09-26 NOTE — PLAN OF CARE
Problem: Risk for Self Injury/Neglect  Goal: Treatment Goal: Remain safe during length of stay, learn and adopt new coping skills, and be free of self-injurious ideation, impulses and acts at the time of discharge  Outcome: Progressing  Goal: Verbalize thoughts and feelings  Description: Interventions:  - Assess and re-assess patient's lethality and potential for self-injury  - Engage patient in 1:1 interactions, daily, for a minimum of 15 minutes  - Encourage patient to express feelings, fears, frustrations, hopes  - Establish rapport/trust with patient   Outcome: Progressing  Goal: Refrain from harming self  Description: Interventions:  - Monitor patient closely, per order  - Develop a trusting relationship  - Supervise medication ingestion, monitor effects and side effects   Outcome: Progressing  Goal: Attend and participate in unit activities, including therapeutic, recreational, and educational groups  Description: Interventions:  - Provide therapeutic and educational activities daily, encourage attendance and participation, and document same in the medical record  - Obtain collateral information, encourage visitation and family involvement in care   Outcome: Progressing  Goal: Recognize maladaptive responses and adopt new coping mechanisms  Outcome: Progressing  Goal: Complete daily ADLs, including personal hygiene independently, as able  Description: Interventions:  - Observe, teach, and assist patient with ADLS  - Monitor and promote a balance of rest/activity, with adequate nutrition and elimination  Outcome: Progressing     Problem: Depression  Goal: Treatment Goal: Demonstrate behavioral control of depressive symptoms, verbalize feelings of improved mood/affect, and adopt new coping skills prior to discharge  Outcome: Progressing  Goal: Verbalize thoughts and feelings  Description: Interventions:  - Assess and re-assess patient's level of risk   - Engage patient in 1:1 interactions, daily, for a minimum of 15 minutes   - Encourage patient to express feelings, fears, frustrations, hopes   Outcome: Progressing  Goal: Refrain from harming self  Description: Interventions:  - Monitor patient closely, per order   - Supervise medication ingestion, monitor effects and side effects   Outcome: Progressing  Goal: Refrain from isolation  Description: Interventions:  - Develop a trusting relationship   - Encourage socialization   Outcome: Progressing  Goal: Refrain from self-neglect  Outcome: Progressing  Goal: Attend and participate in unit activities, including therapeutic, recreational, and educational groups  Description: Interventions:  - Provide therapeutic and educational activities daily, encourage attendance and participation, and document same in the medical record   Outcome: Progressing  Goal: Complete daily ADLs, including personal hygiene independently, as able  Description: Interventions:  - Observe, teach, and assist patient with ADLS  -  Monitor and promote a balance of rest/activity, with adequate nutrition and elimination   Outcome: Progressing     Problem: Anxiety  Goal: Anxiety is at manageable level  Description: Interventions:  - Assess and monitor patient's anxiety level. - Monitor for signs and symptoms (heart palpitations, chest pain, shortness of breath, headaches, nausea, feeling jumpy, restlessness, irritable, apprehensive). - Collaborate with interdisciplinary team and initiate plan and interventions as ordered.   - [place] patient to unit/surroundings  - Explain treatment plan  - Encourage participation in care  - Encourage verbalization of concerns/fears  - Identify coping mechanisms  - Assist in developing anxiety-reducing skills  - Administer/offer alternative therapies  - Limit or eliminate stimulants  Outcome: Progressing

## 2023-09-26 NOTE — CMS CERTIFICATION NOTE
Recertification: Based upon physical, mental and social evaluations, I certify that inpatient psychiatric services continue to be medically necessary for this patient for a duration of 7 midnights for the treatment of  Mood disorder [facility])   Available alternative community resources still do not meet the patient's mental health care needs. I further attest that an established written individualized plan of care has been updated and is outlined in the patient's medical records.

## 2023-09-27 LAB
25(OH)D3 SERPL-MCNC: 17.6 NG/ML (ref 30–100)
ALBUMIN SERPL BCP-MCNC: 4.1 G/DL (ref 3.5–5)
ALP SERPL-CCNC: 50 U/L (ref 34–104)
ALT SERPL W P-5'-P-CCNC: 6 U/L (ref 7–52)
ANION GAP SERPL CALCULATED.3IONS-SCNC: 7 MMOL/L
AST SERPL W P-5'-P-CCNC: 12 U/L (ref 13–39)
BASOPHILS # BLD AUTO: 0.06 THOUSANDS/ÂΜL (ref 0–0.1)
BASOPHILS NFR BLD AUTO: 2 % (ref 0–1)
BILIRUB SERPL-MCNC: 0.45 MG/DL (ref 0.2–1)
BUN SERPL-MCNC: 17 MG/DL (ref 5–25)
CALCIUM SERPL-MCNC: 9.8 MG/DL (ref 8.4–10.2)
CHLORIDE SERPL-SCNC: 103 MMOL/L (ref 96–108)
CHOLEST SERPL-MCNC: 176 MG/DL
CO2 SERPL-SCNC: 27 MMOL/L (ref 21–32)
CREAT SERPL-MCNC: 1.35 MG/DL (ref 0.6–1.3)
EOSINOPHIL # BLD AUTO: 0.04 THOUSAND/ÂΜL (ref 0–0.61)
EOSINOPHIL NFR BLD AUTO: 1 % (ref 0–6)
ERYTHROCYTE [DISTWIDTH] IN BLOOD BY AUTOMATED COUNT: 15 % (ref 11.6–15.1)
FOLATE SERPL-MCNC: 6.5 NG/ML
GFR SERPL CREATININE-BSD FRML MDRD: 50 ML/MIN/1.73SQ M
GLUCOSE SERPL-MCNC: 121 MG/DL (ref 65–140)
HCT VFR BLD AUTO: 41.4 % (ref 34.8–46.1)
HDLC SERPL-MCNC: 67 MG/DL
HGB BLD-MCNC: 12.7 G/DL (ref 11.5–15.4)
IMM GRANULOCYTES # BLD AUTO: 0.01 THOUSAND/UL (ref 0–0.2)
IMM GRANULOCYTES NFR BLD AUTO: 0 % (ref 0–2)
LDLC SERPL CALC-MCNC: 100 MG/DL (ref 0–100)
LYMPHOCYTES # BLD AUTO: 0.9 THOUSANDS/ÂΜL (ref 0.6–4.47)
LYMPHOCYTES NFR BLD AUTO: 23 % (ref 14–44)
MCH RBC QN AUTO: 26.1 PG (ref 26.8–34.3)
MCHC RBC AUTO-ENTMCNC: 30.7 G/DL (ref 31.4–37.4)
MCV RBC AUTO: 85 FL (ref 82–98)
MONOCYTES # BLD AUTO: 0.4 THOUSAND/ÂΜL (ref 0.17–1.22)
MONOCYTES NFR BLD AUTO: 10 % (ref 4–12)
NEUTROPHILS # BLD AUTO: 2.52 THOUSANDS/ÂΜL (ref 1.85–7.62)
NEUTS SEG NFR BLD AUTO: 64 % (ref 43–75)
NONHDLC SERPL-MCNC: 109 MG/DL
NRBC BLD AUTO-RTO: 0 /100 WBCS
PLATELET # BLD AUTO: 258 THOUSANDS/UL (ref 149–390)
PMV BLD AUTO: 11.8 FL (ref 8.9–12.7)
POTASSIUM SERPL-SCNC: 4.2 MMOL/L (ref 3.5–5.3)
PROT SERPL-MCNC: 7 G/DL (ref 6.4–8.4)
RBC # BLD AUTO: 4.87 MILLION/UL (ref 3.81–5.12)
SODIUM SERPL-SCNC: 137 MMOL/L (ref 135–147)
TRIGL SERPL-MCNC: 43 MG/DL
TSH SERPL DL<=0.05 MIU/L-ACNC: 1.43 UIU/ML (ref 0.45–4.5)
VIT B12 SERPL-MCNC: 438 PG/ML (ref 180–914)
WBC # BLD AUTO: 3.93 THOUSAND/UL (ref 4.31–10.16)

## 2023-09-27 PROCEDURE — 82306 VITAMIN D 25 HYDROXY: CPT | Performed by: PHYSICIAN ASSISTANT

## 2023-09-27 PROCEDURE — 80053 COMPREHEN METABOLIC PANEL: CPT | Performed by: PHYSICIAN ASSISTANT

## 2023-09-27 PROCEDURE — 82607 VITAMIN B-12: CPT | Performed by: PHYSICIAN ASSISTANT

## 2023-09-27 PROCEDURE — 99233 SBSQ HOSP IP/OBS HIGH 50: CPT | Performed by: PSYCHIATRY & NEUROLOGY

## 2023-09-27 PROCEDURE — 85025 COMPLETE CBC W/AUTO DIFF WBC: CPT | Performed by: PHYSICIAN ASSISTANT

## 2023-09-27 PROCEDURE — 80061 LIPID PANEL: CPT | Performed by: PHYSICIAN ASSISTANT

## 2023-09-27 PROCEDURE — 84443 ASSAY THYROID STIM HORMONE: CPT | Performed by: PHYSICIAN ASSISTANT

## 2023-09-27 PROCEDURE — 82746 ASSAY OF FOLIC ACID SERUM: CPT | Performed by: PHYSICIAN ASSISTANT

## 2023-09-27 RX ORDER — BISACODYL 10 MG
10 SUPPOSITORY, RECTAL RECTAL DAILY PRN
Status: DISCONTINUED | OUTPATIENT
Start: 2023-09-27 | End: 2023-09-30 | Stop reason: HOSPADM

## 2023-09-27 RX ORDER — POLYETHYLENE GLYCOL 3350 17 G/17G
17 POWDER, FOR SOLUTION ORAL DAILY PRN
Status: DISCONTINUED | OUTPATIENT
Start: 2023-09-27 | End: 2023-09-30 | Stop reason: HOSPADM

## 2023-09-27 RX ADMIN — HYDROXYZINE HYDROCHLORIDE 100 MG: 50 TABLET, FILM COATED ORAL at 00:58

## 2023-09-27 RX ADMIN — DIVALPROEX SODIUM 500 MG: 500 TABLET, EXTENDED RELEASE ORAL at 09:05

## 2023-09-27 RX ADMIN — TRAZODONE HYDROCHLORIDE 50 MG: 50 TABLET ORAL at 00:58

## 2023-09-27 NOTE — NURSING NOTE
Pt remains withdrawn to room, flat/depressed affect. Pt denies SI/HI, slightly tearful at times. Reports being unsure of improvement with mood. Encouraged pt to attend groups throughout the day however pt seems disinterested with this. Pt states wanting to rest as she did not sleep well overnight. Encouraged pt to seek staff with any concerns or questions.

## 2023-09-27 NOTE — PROGRESS NOTES
Status: Pt scant with staff, pacing the unit halls at times. In the afternoon, Pt on the phone screaming and crying; this happened again in the evening. Pt refused labs again this morning. Due to Pt's medical history, staff will attempt to get labs again now. Pt denied any SI/HI or hallucinations. Pt initially refused PRN medications last evening, but then agreed and appeared to have slept. Medication: Depakote 500mg daily started / PRN Atarax, Trazodone, & Zyprexa  D/C: Unknown / Pt did not want to speak with CM, provided minimal information for intake. 09/27/23 0750   Team Meeting   Meeting Type Daily Rounds   Team Members Present   Team Members Present Physician;Nurse;; Other (Discipline and Name)   Physician Team Member Dr. Guilherme Couch / Dr. Caitie Covarrubias / Viraj Pineda / Loris Boas Team Member Eleni Rosado / [facility] Management Team Member Lynda Pang / Donald Byrd / Aris Durbin   Other (Discipline and Name) Pb(art therapy) / Thania(group facilitator)   Patient/Family Present   Patient Present No   Patient's Family Present No

## 2023-09-27 NOTE — NURSING NOTE
Patient was observed crying loudly in the halls. With some persuasion she took Zyprexa 5 mg PO at 2010. PRN was effective. Pt. Is sleeping.

## 2023-09-27 NOTE — NURSING NOTE
Patient was observed in her room crying loudly. Offered patient medication, but she refused, stating that "I don't want any medication, I didn't come here for medication". Pt demanding to go home. Staff had 1:1 with patient to educate on benefits of medication and utilizing coping skills. Pt continues to express feelings of hopelessness, denied SI/HI/AVH. This writer explained to pt the medications that she has available and how it can improve her mood. Pt then agreed to take medication. Pt was given PRN Atarax 100mg po for increased anxiety at 0058. Zapien score 29. She also received prn trazodone 50 mg po for insomnia at 0058. Upon f/u in 1hr, pt is resting in bed with eyes closed. No s/s of distress noted.

## 2023-09-27 NOTE — PROGRESS NOTES
Progress Note - Behavioral Health   Jennifer Morelos 26 y.o. female MRN: [id number]  Unit/Bed#: [facility] 207-02 Encounter: [id number]    Assessment:  Principal Problem:    Mood disorder ([address])  Active Problems:    Medical clearance for psychiatric admission      Plan:  --Continue with psychiatric hospitalization  --Continue with individual, group, and milieu therapy  --Continue the following medications:  Current Facility-Administered Medications   Medication Dose Route Frequency   • acetaminophen (TYLENOL) tablet 650 mg  650 mg Oral Q6H PRN   • acetaminophen (TYLENOL) tablet 650 mg  650 mg Oral Q4H PRN   • acetaminophen (TYLENOL) tablet 975 mg  975 mg Oral Q6H PRN   • aluminum-magnesium hydroxide-simethicone (MAALOX) oral suspension 30 mL  30 mL Oral Q4H PRN   • LORazepam (ATIVAN) injection 1 mg  1 mg Intramuscular Q4H PRN Max 4/day    And   • benztropine (COGENTIN) injection 0.5 mg  0.5 mg Intramuscular Q4H PRN Max 4/day   • LORazepam (ATIVAN) injection 2 mg  2 mg Intramuscular Q4H PRN Max 4/day    And   • benztropine (COGENTIN) injection 1 mg  1 mg Intramuscular Q4H PRN Max 4/day   • benztropine (COGENTIN) tablet 1 mg  1 mg Oral Q4H PRN Max 6/day   • bisacodyl (DULCOLAX) rectal suppository 10 mg  10 mg Rectal Daily PRN   • hydrOXYzine HCL (ATARAX) tablet 50 mg  50 mg Oral Q6H PRN Max 4/day    Or   • diphenhydrAMINE (BENADRYL) injection 50 mg  50 mg Intramuscular Q6H PRN   • divalproex sodium (DEPAKOTE ER) 24 hr tablet 500 mg  500 mg Oral Daily   • hydrOXYzine HCL (ATARAX) tablet 100 mg  100 mg Oral Q6H PRN Max 4/day    Or   • LORazepam (ATIVAN) injection 2 mg  2 mg Intramuscular Q6H PRN   • hydrOXYzine HCL (ATARAX) tablet 25 mg  25 mg Oral Q6H PRN Max 4/day   • OLANZapine (ZyPREXA) tablet 10 mg  10 mg Oral Q8H PRN    Or   • OLANZapine (ZyPREXA) IM injection 10 mg  10 mg Intramuscular Q8H PRN   • OLANZapine (ZyPREXA) tablet 2.5 mg  2.5 mg Oral Q3H PRN   • OLANZapine (ZyPREXA) tablet 5 mg  5 mg Oral Q6H PRN   • polyethylene glycol (MIRALAX) packet 17 g  17 g Oral Daily PRN   • senna-docusate sodium (SENOKOT S) 8.6-50 mg per tablet 1 tablet  1 tablet Oral Daily PRN   • traZODone (DESYREL) tablet 50 mg  50 mg Oral HS PRN       Subjective: Patient was seen for continuation of care. Chart was reviewed and discussed with treatment team.     Over the past 24 hours, the patient was crying and yelling and had a very difficult phone call with family. She received Atarax and Trazodone PRN. Today, patient was seen and examined at bedside for continuation of care. Patient states she is doing "the same" in "absolute terms" but "maybe a little better" in "relative terms." She is still hypomanic and bordering on psychotic. She is labile and has emotional lability. We discussed the importance of routine bloodwork monitoring given the patients history of labwork abnormalities related to her kidney; she had initially refused labs this morning but did ultimately allow the staff to re-draw labs. She is otherwise w/o complaint. Patient denied adverse effects to their psychiatric medication regimen. Patient denied other new or worsening psychiatric symptoms/complaints at this time. Discussed the importance of continuing to take medications as prescribed, as well as the importance of continuing to attend groups on the unit. Psychiatric Review of Systems:  Medication adverse effects: none  Sleep: unchanged  Appetite: unchanged  Behavior over the past 24 hours: as per above    Vitals:  Vitals:    09/25/23 1634   Pulse: 69   Resp: 14   SpO2: 98%       Laboratory results:    I have personally reviewed all pertinent laboratory/tests results  No results found for this or any previous visit (from the past 48 hour(s)). Current Medications:  Current medications as per above. All medications have been reviewed. Risks, benefits, alternatives, and possible side effects of patient's psychiatric medications were discussed with patient.      Mental Status Evaluation:  Appearance: disheveled, appears consistent with stated age  Motor: +psychomotor agitation, no gait abnormalities  Behavior: superficially cooperative, requires repeated verbal redirection  Speech: pressured, volume is intermittently loud  Mood: "In absolute terms, I am the same"  Affect: expansive, labile, manic-appearing  Thought Process: disorganized, tangential, grandiose  Thought Content: denies auditory hallucinations, denies visual hallucinations, denies delusions  Risk Potential: denies suicidal ideation, plan, or intent. Denies homicidal ideation  Sensorium: Oriented to person, place, time, and situation  Cognition: cognitive ability appears intact but was not quantitatively tested  Consciousness: alert and awake  Attention: currently impaired  Insight: limited  Judgement: limited      Progress Toward Goals & Illness Status: Patient is not at goal. They are not yet ready for discharge. The patient's condition currently requires active psychopharmacological medication management, interdisciplinary coordination with case management, and the utilization of adjunctive milieu and group therapy to augment psychopharmacological efficacy. The patient's risk of morbidity, and progression or decompensation of psychiatric disease, is higher without this current treatment. This note has been constructed using a voice recognition system. There may be translation, syntax, or grammatical errors. If you have any questions, please contact the dictating provider.

## 2023-09-27 NOTE — NURSING NOTE
Patient still resting in bed with eyes closed, no episodes of restlessness observed. Safety rounds maintained throughout the night.

## 2023-09-28 PROCEDURE — 99232 SBSQ HOSP IP/OBS MODERATE 35: CPT | Performed by: PSYCHIATRY & NEUROLOGY

## 2023-09-28 RX ADMIN — HYDROXYZINE HYDROCHLORIDE 100 MG: 50 TABLET, FILM COATED ORAL at 20:08

## 2023-09-28 RX ADMIN — DIVALPROEX SODIUM 500 MG: 500 TABLET, EXTENDED RELEASE ORAL at 08:38

## 2023-09-28 RX ADMIN — OLANZAPINE 5 MG: 5 TABLET, FILM COATED ORAL at 21:54

## 2023-09-28 NOTE — NURSING NOTE
Felix Stevenson is calm upon approach, exhibits a flat affect and is observed resting in bed and napping intermittently this evening. Patient appears disheveled, but declined when encouraged to perform ADLs. Felix Stevenson is scant in conversation and maintains poor eye contact with this writer. Patient denies current SI/HI/AH/VH, however, patient does appear internally preoccupied. Felix Stevenson remains withdrawn to her room, came out to get snack and returned to room. Patient was encouraged to maintain compliance with prescribed medication. Felix Stevenson was encouraged to seek staff with any questions and/or concerns, verbalized understanding.

## 2023-09-28 NOTE — NURSING NOTE
Patient lying in bed awake. Pleasant but tearful during conversation. Stated she spoke with her father on the phone today about moving to [facility]. She wasn't sure if the call went well or not. She thinks he accepted her decision to move to [place], then stated, "I don't know what kind of power he has over me - maybe it's spiritual." She stated she came to the decision to move to [place] when "I was in a bar in [facility]". Patient stated she has a Master's Degree from [employer] in politics and wants to move back. Denies SI / HI / AVH. Stated she "used to see things, but I don't know the last time it happened. I can't explain it." She was encouraged to attend group but wanted to remain in bed.

## 2023-09-28 NOTE — PLAN OF CARE
Problem: Risk for Self Injury/Neglect  Goal: Refrain from harming self  Description: Interventions:  - Monitor patient closely, per order  - Develop a trusting relationship  - Supervise medication ingestion, monitor effects and side effects   Outcome: Progressing     Problem: Anxiety  Goal: Anxiety is at manageable level  Description: Interventions:  - Assess and monitor patient's anxiety level. - Monitor for signs and symptoms (heart palpitations, chest pain, shortness of breath, headaches, nausea, feeling jumpy, restlessness, irritable, apprehensive). - Collaborate with interdisciplinary team and initiate plan and interventions as ordered.   - [place] patient to unit/surroundings  - Explain treatment plan  - Encourage participation in care  - Encourage verbalization of concerns/fears  - Identify coping mechanisms  - Assist in developing anxiety-reducing skills  - Administer/offer alternative therapies  - Limit or eliminate stimulants  Outcome: Progressing

## 2023-09-28 NOTE — PROGRESS NOTES
Treatment Plan Meeting:  Diagnosis of Mood disorder reviewed. Discussed short term goals for ability to stay safe on the unit, and improvement in insight. At this time, no discharge date is set. All parties in agreement and treatment plan was signed.      09/27/23 9329   Team Meeting   Meeting Type Tx Team Meeting   Initial Conference Date 09/27/23   Next Conference Date 10/23/23   Team Members Present   Team Members Present Physician;Nurse;   Physician Team Member Dr. Emiliano Mendiola Team Member [facility] Management Team Member [place]   Patient/Family Present   Patient Present No  (Pt declined to meet with Treatment Team)   Patient's Family Present No

## 2023-09-28 NOTE — PROGRESS NOTES
Progress Note - Behavioral Health   Jorge Luis Love 26 y.o. female MRN: [id number]  Unit/Bed#: [facility] 207-02 Encounter: [id number]    Assessment:  Principal Problem:    Mood disorder ([address])  Active Problems:    Medical clearance for psychiatric admission      Plan:  --Discharge Saturday as per 72-hour notice  --Continue with psychiatric hospitalization  --Continue with individual, group, and milieu therapy  --Continue the following medications:  Current Facility-Administered Medications   Medication Dose Route Frequency   • acetaminophen (TYLENOL) tablet 650 mg  650 mg Oral Q6H PRN   • acetaminophen (TYLENOL) tablet 650 mg  650 mg Oral Q4H PRN   • acetaminophen (TYLENOL) tablet 975 mg  975 mg Oral Q6H PRN   • aluminum-magnesium hydroxide-simethicone (MAALOX) oral suspension 30 mL  30 mL Oral Q4H PRN   • LORazepam (ATIVAN) injection 1 mg  1 mg Intramuscular Q4H PRN Max 4/day    And   • benztropine (COGENTIN) injection 0.5 mg  0.5 mg Intramuscular Q4H PRN Max 4/day   • LORazepam (ATIVAN) injection 2 mg  2 mg Intramuscular Q4H PRN Max 4/day    And   • benztropine (COGENTIN) injection 1 mg  1 mg Intramuscular Q4H PRN Max 4/day   • benztropine (COGENTIN) tablet 1 mg  1 mg Oral Q4H PRN Max 6/day   • bisacodyl (DULCOLAX) rectal suppository 10 mg  10 mg Rectal Daily PRN   • hydrOXYzine HCL (ATARAX) tablet 50 mg  50 mg Oral Q6H PRN Max 4/day    Or   • diphenhydrAMINE (BENADRYL) injection 50 mg  50 mg Intramuscular Q6H PRN   • divalproex sodium (DEPAKOTE ER) 24 hr tablet 500 mg  500 mg Oral Daily   • hydrOXYzine HCL (ATARAX) tablet 100 mg  100 mg Oral Q6H PRN Max 4/day    Or   • LORazepam (ATIVAN) injection 2 mg  2 mg Intramuscular Q6H PRN   • hydrOXYzine HCL (ATARAX) tablet 25 mg  25 mg Oral Q6H PRN Max 4/day   • OLANZapine (ZyPREXA) tablet 10 mg  10 mg Oral Q8H PRN    Or   • OLANZapine (ZyPREXA) IM injection 10 mg  10 mg Intramuscular Q8H PRN   • OLANZapine (ZyPREXA) tablet 2.5 mg  2.5 mg Oral Q3H PRN   • OLANZapine (ZyPREXA) tablet 5 mg  5 mg Oral Q6H PRN   • polyethylene glycol (MIRALAX) packet 17 g  17 g Oral Daily PRN   • senna-docusate sodium (SENOKOT S) 8.6-50 mg per tablet 1 tablet  1 tablet Oral Daily PRN   • traZODone (DESYREL) tablet 50 mg  50 mg Oral HS PRN       Subjective: Patient was seen for continuation of care. Chart was reviewed and discussed with treatment team.     No acute behavioral events over the past 24 hours. Today, patient was seen and examined at bedside for continuation of care. Patient signed a 72-hour notice to withdraw from treatment. Today, the patient states that she feels fine and that she wants to be discharged. She does not believe that she needs to be here. She states that she is following God's voice and she will do what he tells her to do. She continues to have limited insight and judgment. Despite that, there is no criteria through which she can be involuntarily petitioned. She is tolerating VPA without adverse effects. Patient denied adverse effects to their psychiatric medication regimen. Patient denied other new or worsening psychiatric symptoms/complaints at this time. Discussed the importance of continuing to take medications as prescribed, as well as the importance of continuing to attend groups on the unit.      Psychiatric Review of Systems:  Medication adverse effects: none  Sleep: unchanged  Appetite: unchanged  Behavior over the past 24 hours: as per above    Vitals:  Vitals:    09/25/23 1634   Pulse: 69   Resp: 14   SpO2: 98%       Laboratory results:    I have personally reviewed all pertinent laboratory/tests results  Recent Results (from the past 48 hour(s))   Vitamin D 25 hydroxy    Collection Time: 09/27/23  9:22 AM   Result Value Ref Range    Vit D, 25-Hydroxy 17.6 (L) 30.0 - 100.0 ng/mL   CBC and differential    Collection Time: 09/27/23  9:22 AM   Result Value Ref Range    WBC 3.93 (L) 4.31 - 10.16 Thousand/uL    RBC 4.87 3.81 - 5.12 Million/uL    Hemoglobin 12.7 11.5 - 15.4 g/dL    Hematocrit 41.4 34.8 - 46.1 %    MCV 85 82 - 98 fL    MCH 26.1 (L) 26.8 - 34.3 pg    MCHC 30.7 (L) 31.4 - 37.4 g/dL    RDW 15.0 11.6 - 15.1 %    MPV 11.8 8.9 - 12.7 fL    Platelets 028 903 - 697 Thousands/uL    nRBC 0 /100 WBCs    Neutrophils Relative 64 43 - 75 %    Immat GRANS % 0 0 - 2 %    Lymphocytes Relative 23 14 - 44 %    Monocytes Relative 10 4 - 12 %    Eosinophils Relative 1 0 - 6 %    Basophils Relative 2 (H) 0 - 1 %    Neutrophils Absolute 2.52 1.85 - 7.62 Thousands/µL    Immature Grans Absolute 0.01 0.00 - 0.20 Thousand/uL    Lymphocytes Absolute 0.90 0.60 - 4.47 Thousands/µL    Monocytes Absolute 0.40 0.17 - 1.22 Thousand/µL    Eosinophils Absolute 0.04 0.00 - 0.61 Thousand/µL    Basophils Absolute 0.06 0.00 - 0.10 Thousands/µL   Comprehensive metabolic panel    Collection Time: 09/27/23  9:22 AM   Result Value Ref Range    Sodium 137 135 - 147 mmol/L    Potassium 4.2 3.5 - 5.3 mmol/L    Chloride 103 96 - 108 mmol/L    CO2 27 21 - 32 mmol/L    ANION GAP 7 mmol/L    BUN 17 5 - 25 mg/dL    Creatinine 1.35 (H) 0.60 - 1.30 mg/dL    Glucose 121 65 - 140 mg/dL    Calcium 9.8 8.4 - 10.2 mg/dL    AST 12 (L) 13 - 39 U/L    ALT 6 (L) 7 - 52 U/L    Alkaline Phosphatase 50 34 - 104 U/L    Total Protein 7.0 6.4 - 8.4 g/dL    Albumin 4.1 3.5 - 5.0 g/dL    Total Bilirubin 0.45 0.20 - 1.00 mg/dL    eGFR 50 ml/min/1.73sq m   Folate    Collection Time: 09/27/23  9:22 AM   Result Value Ref Range    Folate 6.5 >5.9 ng/mL   Vitamin B12    Collection Time: 09/27/23  9:22 AM   Result Value Ref Range    Vitamin B-12 438 180 - 914 pg/mL   TSH, 3rd generation with Free T4 reflex    Collection Time: 09/27/23  9:22 AM   Result Value Ref Range    TSH 3RD GENERATON 1.431 0.450 - 4.500 uIU/mL   Lipid panel    Collection Time: 09/27/23  9:22 AM   Result Value Ref Range    Cholesterol 176 See Comment mg/dL    Triglycerides 43 See Comment mg/dL    HDL, Direct 67 >=50 mg/dL    LDL Calculated 100 0 - 100 mg/dL Non-HDL-Chol (CHOL-HDL) 109 mg/dl        Current Medications:  Current medications as per above. All medications have been reviewed. Risks, benefits, alternatives, and possible side effects of patient's psychiatric medications were discussed with patient. Mental Status Evaluation:  Appearance: disheveled, appears consistent with stated age  Motor: +mild psychomotor agitation, no gait abnormalities  Behavior: superficially cooperative, requires repeated verbal redirection  Speech: pressured, volume is intermittently loud  Mood: "I feel fine"  Affect: a bit expansive  Thought Process: mildly disorganized  Thought Content: denies auditory hallucinations, denies visual hallucinations, denies delusions  Risk Potential: denies suicidal ideation, plan, or intent. Denies homicidal ideation  Sensorium: Oriented to person, place, time, and situation  Cognition: cognitive ability appears intact but was not quantitatively tested  Consciousness: alert and awake  Attention: currently impaired  Insight: limited  Judgement: limited      Progress Toward Goals & Illness Status: Patient is not at goal. They are not yet ready for discharge. The patient's condition currently requires active psychopharmacological medication management, interdisciplinary coordination with case management, and the utilization of adjunctive milieu and group therapy to augment psychopharmacological efficacy. The patient's risk of morbidity, and progression or decompensation of psychiatric disease, is higher without this current treatment. This note has been constructed using a voice recognition system. There may be translation, syntax, or grammatical errors. If you have any questions, please contact the dictating provider.

## 2023-09-28 NOTE — NURSING NOTE
Pt remains mostly withdrawn to room, sleeping at times. Flat affect, pt denies SI/HI, AVH. Reports mood has improved. Pt remains minimal in conversation. Denies any questions/concerns regarding scheduled medications. Encouraged attendance to groups however pt declined throughout the day.

## 2023-09-28 NOTE — PROGRESS NOTES
Status: Pt remains withdrawn and scant. Pt is calm with flat affect & lays in bed the majority of the time. Pt denies any SI/HI or hallucinations. Pt appeared to have slept overnight. Medication: no changes / no PRNs  D/C: Saturday - Pt signed a 72 hour notice on 0/27 @ 1630     09/28/23 0750   Team Meeting   Meeting Type Daily Rounds   Team Members Present   Team Members Present Physician;Nurse; Other (Discipline and Name);    Physician Team Member Dr. Dany Sharif / Dr. Leonora Tolbert / Wilson Alarcon / Mazin Bridges Team Member [facility] Management Team Member Karmen Reich / Elen Randall / Mira Ford   Other (Discipline and Name) Pb(art therapy) / Thania(group facilitator)   Patient/Family Present   Patient Present No   Patient's Family Present No

## 2023-09-28 NOTE — PLAN OF CARE
Problem: Risk for Self Injury/Neglect  Goal: Verbalize thoughts and feelings  Description: Interventions:  - Assess and re-assess patient's lethality and potential for self-injury  - Engage patient in 1:1 interactions, daily, for a minimum of 15 minutes  - Encourage patient to express feelings, fears, frustrations, hopes  - Establish rapport/trust with patient   Outcome: Progressing  Goal: Refrain from harming self  Description: Interventions:  - Monitor patient closely, per order  - Develop a trusting relationship  - Supervise medication ingestion, monitor effects and side effects   Outcome: Progressing     Problem: Depression  Goal: Verbalize thoughts and feelings  Description: Interventions:  - Assess and re-assess patient's level of risk   - Engage patient in 1:1 interactions, daily, for a minimum of 15 minutes   - Encourage patient to express feelings, fears, frustrations, hopes   Outcome: Progressing  Goal: Refrain from harming self  Description: Interventions:  - Monitor patient closely, per order   - Supervise medication ingestion, monitor effects and side effects   Outcome: Progressing

## 2023-09-29 PROCEDURE — 99232 SBSQ HOSP IP/OBS MODERATE 35: CPT | Performed by: PSYCHIATRY & NEUROLOGY

## 2023-09-29 RX ADMIN — TRAZODONE HYDROCHLORIDE 50 MG: 50 TABLET ORAL at 00:18

## 2023-09-29 RX ADMIN — DIVALPROEX SODIUM 500 MG: 500 TABLET, EXTENDED RELEASE ORAL at 08:35

## 2023-09-29 NOTE — NURSING NOTE
Patient was restless and verbally disruptive at the beginning of the shift. Screaming and crying, when approached by staff she stated she wants to leave this place. Offered sleep aid which she initially refused but later complied with at 0018 hrs. Patient appeared to have fallen asleep approximately 0040 hrs in no obvious distress, at time of note patient appeared to be asleep in no obvious cp distress.

## 2023-09-29 NOTE — NURSING NOTE
F/U to Trazodone administration for insomnia at 0018 hrs. Patient laying quietly in her room eyes close, even bilateral chest expansion, no distress observed.

## 2023-09-29 NOTE — PLAN OF CARE
Problem: Risk for Self Injury/Neglect  Goal: Treatment Goal: Remain safe during length of stay, learn and adopt new coping skills, and be free of self-injurious ideation, impulses and acts at the time of discharge  Outcome: Progressing  Goal: Verbalize thoughts and feelings  Description: Interventions:  - Assess and re-assess patient's lethality and potential for self-injury  - Engage patient in 1:1 interactions, daily, for a minimum of 15 minutes  - Encourage patient to express feelings, fears, frustrations, hopes  - Establish rapport/trust with patient   Outcome: Progressing  Goal: Refrain from harming self  Description: Interventions:  - Monitor patient closely, per order  - Develop a trusting relationship  - Supervise medication ingestion, monitor effects and side effects   Outcome: Progressing  Goal: Attend and participate in unit activities, including therapeutic, recreational, and educational groups  Description: Interventions:  - Provide therapeutic and educational activities daily, encourage attendance and participation, and document same in the medical record  - Obtain collateral information, encourage visitation and family involvement in care   Outcome: Progressing  Goal: Recognize maladaptive responses and adopt new coping mechanisms  Outcome: Progressing  Goal: Complete daily ADLs, including personal hygiene independently, as able  Description: Interventions:  - Observe, teach, and assist patient with ADLS  - Monitor and promote a balance of rest/activity, with adequate nutrition and elimination  Outcome: Progressing     Problem: Depression  Goal: Treatment Goal: Demonstrate behavioral control of depressive symptoms, verbalize feelings of improved mood/affect, and adopt new coping skills prior to discharge  Outcome: Progressing  Goal: Verbalize thoughts and feelings  Description: Interventions:  - Assess and re-assess patient's level of risk   - Engage patient in 1:1 interactions, daily, for a minimum of 15 minutes   - Encourage patient to express feelings, fears, frustrations, hopes   Outcome: Progressing  Goal: Refrain from harming self  Description: Interventions:  - Monitor patient closely, per order   - Supervise medication ingestion, monitor effects and side effects   Outcome: Progressing  Goal: Refrain from isolation  Description: Interventions:  - Develop a trusting relationship   - Encourage socialization   Outcome: Progressing  Goal: Refrain from self-neglect  Outcome: Progressing  Goal: Attend and participate in unit activities, including therapeutic, recreational, and educational groups  Description: Interventions:  - Provide therapeutic and educational activities daily, encourage attendance and participation, and document same in the medical record   Outcome: Progressing  Goal: Complete daily ADLs, including personal hygiene independently, as able  Description: Interventions:  - Observe, teach, and assist patient with ADLS  -  Monitor and promote a balance of rest/activity, with adequate nutrition and elimination   Outcome: Progressing     Problem: Anxiety  Goal: Anxiety is at manageable level  Description: Interventions:  - Assess and monitor patient's anxiety level. - Monitor for signs and symptoms (heart palpitations, chest pain, shortness of breath, headaches, nausea, feeling jumpy, restlessness, irritable, apprehensive). - Collaborate with interdisciplinary team and initiate plan and interventions as ordered.   - [place] patient to unit/surroundings  - Explain treatment plan  - Encourage participation in care  - Encourage verbalization of concerns/fears  - Identify coping mechanisms  - Assist in developing anxiety-reducing skills  - Administer/offer alternative therapies  - Limit or eliminate stimulants  Outcome: Progressing     Problem: DISCHARGE PLANNING  Goal: Discharge to home or other facility with appropriate resources  Description: INTERVENTIONS:  - Identify barriers to discharge w/patient and caregiver  - Arrange for needed discharge resources and transportation as appropriate  - Identify discharge learning needs (meds, wound care, etc.)  - Arrange for interpretive services to assist at discharge as needed  - Refer to Case Management Department for coordinating discharge planning if the patient needs post-hospital services based on physician/advanced practitioner order or complex needs related to functional status, cognitive ability, or social support system  Outcome: Progressing

## 2023-09-29 NOTE — DISCHARGE INSTR - APPOINTMENTS
Chelsea Wiseman or Carleen, our Amrit and [employer], will be calling you after your discharge, on the phone number that you provided. They will be available as an additional support, if needed. If you wish to speak with one of them, you may contact Chelsea Wiseman at [phone] or Piotr Daugherty at [phone]. You are being discharged home to [address] [place] [address]. You provided home phone number [phone] as the best way to contact you.

## 2023-09-29 NOTE — CASE MANAGEMENT
CM met with Pt, as she signed a 72 hour notice & d/c is planned for tomorrow. Pt said that she would need transportation & confirmed she is going to the home address. CM spoke with Pt about referrals for therapy at a minimum, but Pt declined and said it wasn't necessary. CM suggested [facility] services, but Pt again declined. Pt scant and guarded but declined any concerns. CM reviewed that Pt's father had left a voicemail, and CM asked if Pt would sign an EMILIANO for CM to be able to call him back? CM asked to at least be able to notify him that Pt will be transported home tomorrow, & Pt did agreed to this; EMILIANO obtained for notification of discharge. Pt had no questions for CM. CM electronically sent transportation request for tomorrow: ETA 1000 via [employer].      CM contacted Pt's father, Romelia Ascencio, @ [phone] & left a message to notify him of Pt's planned discharge for tomorrow and that transportation would be provided at 10:00 AM.

## 2023-09-29 NOTE — PROGRESS NOTES
Progress Note - Behavioral Health   Jannie Burgess 26 y.o. female MRN: [id number]  Unit/Bed#: [facility] 207-02 Encounter: [id number]    Assessment:  Principal Problem:    Mood disorder ([address])  Active Problems:    Medical clearance for psychiatric admission      Plan:  --Discharge tomorrow as per 72-hour notice  --Continue with psychiatric hospitalization  --Continue with individual, group, and milieu therapy  --Continue the following medications:  Current Facility-Administered Medications   Medication Dose Route Frequency   • acetaminophen (TYLENOL) tablet 650 mg  650 mg Oral Q6H PRN   • acetaminophen (TYLENOL) tablet 650 mg  650 mg Oral Q4H PRN   • acetaminophen (TYLENOL) tablet 975 mg  975 mg Oral Q6H PRN   • aluminum-magnesium hydroxide-simethicone (MAALOX) oral suspension 30 mL  30 mL Oral Q4H PRN   • LORazepam (ATIVAN) injection 1 mg  1 mg Intramuscular Q4H PRN Max 4/day    And   • benztropine (COGENTIN) injection 0.5 mg  0.5 mg Intramuscular Q4H PRN Max 4/day   • LORazepam (ATIVAN) injection 2 mg  2 mg Intramuscular Q4H PRN Max 4/day    And   • benztropine (COGENTIN) injection 1 mg  1 mg Intramuscular Q4H PRN Max 4/day   • benztropine (COGENTIN) tablet 1 mg  1 mg Oral Q4H PRN Max 6/day   • bisacodyl (DULCOLAX) rectal suppository 10 mg  10 mg Rectal Daily PRN   • hydrOXYzine HCL (ATARAX) tablet 50 mg  50 mg Oral Q6H PRN Max 4/day    Or   • diphenhydrAMINE (BENADRYL) injection 50 mg  50 mg Intramuscular Q6H PRN   • divalproex sodium (DEPAKOTE ER) 24 hr tablet 500 mg  500 mg Oral Daily   • hydrOXYzine HCL (ATARAX) tablet 100 mg  100 mg Oral Q6H PRN Max 4/day    Or   • LORazepam (ATIVAN) injection 2 mg  2 mg Intramuscular Q6H PRN   • hydrOXYzine HCL (ATARAX) tablet 25 mg  25 mg Oral Q6H PRN Max 4/day   • OLANZapine (ZyPREXA) tablet 10 mg  10 mg Oral Q8H PRN    Or   • OLANZapine (ZyPREXA) IM injection 10 mg  10 mg Intramuscular Q8H PRN   • OLANZapine (ZyPREXA) tablet 2.5 mg  2.5 mg Oral Q3H PRN   • OLANZapine (ZyPREXA) tablet 5 mg  5 mg Oral Q6H PRN   • polyethylene glycol (MIRALAX) packet 17 g  17 g Oral Daily PRN   • senna-docusate sodium (SENOKOT S) 8.6-50 mg per tablet 1 tablet  1 tablet Oral Daily PRN   • traZODone (DESYREL) tablet 50 mg  50 mg Oral HS PRN       Subjective: Patient was seen for continuation of care. Chart was reviewed and discussed with treatment team.     Last night, the patient had a crying and screaming episode after having a phone call. Today, patient was seen and examined at bedside for continuation of care. Patient is future oriented and looking forward to discharge home tomorrow. She continues to have fluctuating mild anxiety and has very mild psychomotor agitation but she is redirectable and engages with me relatively appropriately. She is tolerating Depakote well without evidence of adverse effect. She has been intermittently verbally disruptive although has not engaged in any self harming behaviors or behaviors that are dangerous to others. Depakote level was attempted to be drawn but was unsuccessful. Despite this, there is no evidence of Depakote toxicity and the patient has no stated adverse effects. Patient denied adverse effects to their psychiatric medication regimen. Patient denied other new or worsening psychiatric symptoms/complaints at this time. Discussed the importance of continuing to take medications as prescribed, as well as the importance of continuing to attend groups on the unit.      Psychiatric Review of Systems:  Medication adverse effects: none  Sleep: unchanged  Appetite: unchanged  Behavior over the past 24 hours: as per above    Vitals:  Vitals:    09/29/23 0806   BP: 106/72   Pulse: 84   Resp: 17   Temp: 98.6 °F (37 °C)   SpO2: 98%       Laboratory results:    I have personally reviewed all pertinent laboratory/tests results  Recent Results (from the past 48 hour(s))   Vitamin D 25 hydroxy    Collection Time: 09/27/23  9:22 AM   Result Value Ref Range    Vit D, 25-Hydroxy 17.6 (L) 30.0 - 100.0 ng/mL   CBC and differential    Collection Time: 09/27/23  9:22 AM   Result Value Ref Range    WBC 3.93 (L) 4.31 - 10.16 Thousand/uL    RBC 4.87 3.81 - 5.12 Million/uL    Hemoglobin 12.7 11.5 - 15.4 g/dL    Hematocrit 41.4 34.8 - 46.1 %    MCV 85 82 - 98 fL    MCH 26.1 (L) 26.8 - 34.3 pg    MCHC 30.7 (L) 31.4 - 37.4 g/dL    RDW 15.0 11.6 - 15.1 %    MPV 11.8 8.9 - 12.7 fL    Platelets 685 330 - 899 Thousands/uL    nRBC 0 /100 WBCs    Neutrophils Relative 64 43 - 75 %    Immat GRANS % 0 0 - 2 %    Lymphocytes Relative 23 14 - 44 %    Monocytes Relative 10 4 - 12 %    Eosinophils Relative 1 0 - 6 %    Basophils Relative 2 (H) 0 - 1 %    Neutrophils Absolute 2.52 1.85 - 7.62 Thousands/µL    Immature Grans Absolute 0.01 0.00 - 0.20 Thousand/uL    Lymphocytes Absolute 0.90 0.60 - 4.47 Thousands/µL    Monocytes Absolute 0.40 0.17 - 1.22 Thousand/µL    Eosinophils Absolute 0.04 0.00 - 0.61 Thousand/µL    Basophils Absolute 0.06 0.00 - 0.10 Thousands/µL   Comprehensive metabolic panel    Collection Time: 09/27/23  9:22 AM   Result Value Ref Range    Sodium 137 135 - 147 mmol/L    Potassium 4.2 3.5 - 5.3 mmol/L    Chloride 103 96 - 108 mmol/L    CO2 27 21 - 32 mmol/L    ANION GAP 7 mmol/L    BUN 17 5 - 25 mg/dL    Creatinine 1.35 (H) 0.60 - 1.30 mg/dL    Glucose 121 65 - 140 mg/dL    Calcium 9.8 8.4 - 10.2 mg/dL    AST 12 (L) 13 - 39 U/L    ALT 6 (L) 7 - 52 U/L    Alkaline Phosphatase 50 34 - 104 U/L    Total Protein 7.0 6.4 - 8.4 g/dL    Albumin 4.1 3.5 - 5.0 g/dL    Total Bilirubin 0.45 0.20 - 1.00 mg/dL    eGFR 50 ml/min/1.73sq m   Folate    Collection Time: 09/27/23  9:22 AM   Result Value Ref Range    Folate 6.5 >5.9 ng/mL   Vitamin B12    Collection Time: 09/27/23  9:22 AM   Result Value Ref Range    Vitamin B-12 438 180 - 914 pg/mL   TSH, 3rd generation with Free T4 reflex    Collection Time: 09/27/23  9:22 AM   Result Value Ref Range    TSH 3RD GENERATON 1.431 0.450 - 4.500 uIU/mL Lipid panel    Collection Time: 09/27/23  9:22 AM   Result Value Ref Range    Cholesterol 176 See Comment mg/dL    Triglycerides 43 See Comment mg/dL    HDL, Direct 67 >=50 mg/dL    LDL Calculated 100 0 - 100 mg/dL    Non-HDL-Chol (CHOL-HDL) 109 mg/dl        Current Medications:  Current medications as per above. All medications have been reviewed. Risks, benefits, alternatives, and possible side effects of patient's psychiatric medications were discussed with patient. Mental Status Evaluation:  Appearance: moderately kempt  Motor: +very mild psychomotor agitation  Behavior: cooperative, pleasant  Speech: normal rate, rhythm, and volume  Mood: "fine"  Affect: mood-congruent, anxious appearing  Thought Process: organized and linear  Thought Content: denies auditory hallucinations, denies visual hallucinations, denies delusions  Risk Potential: denies suicidal ideation, plan, or intent. Denies homicidal ideation  Sensorium: Oriented to person, place, time, and situation  Cognition: cognitive ability appears intact but was not quantitatively tested  Consciousness: alert and awake  Attention: currently intact  Insight: fair  Judgement: fair      Progress Toward Goals & Illness Status: Patient is not at goal. They are not yet ready for discharge. The patient's condition currently requires active psychopharmacological medication management, interdisciplinary coordination with case management, and the utilization of adjunctive milieu and group therapy to augment psychopharmacological efficacy. The patient's risk of morbidity, and progression or decompensation of psychiatric disease, is higher without this current treatment. This note has been constructed using a voice recognition system. There may be translation, syntax, or grammatical errors. If you have any questions, please contact the dictating provider.

## 2023-09-29 NOTE — NURSING NOTE
Patient is crying and screaming out intermittently. Extensive support from staff needed, patient counseled by RN and BHT. Initially declined PRN medications, eventually accepting of PRN Trazodone as ordered. Remains focused on discharging from the unit.

## 2023-09-29 NOTE — DISCHARGE INSTR - OTHER ORDERS
Crisis Intervention services are available 24 hours a day by calling [phone]. The crisis unit is located at [address] [place] [address]. Services include telephone counseling, mobile crisis, walk-in crisis, and assistance in accessing inpatient care and short-term crisis residential services. The PEER LINE is a toll-free phone number for people in [facility] who are seeking a listening ear for additional support in their recovery from mental illness. The PEER LINE is peer-run and peer-friendly. Callers to the [address] [place] [address] will speak directly to other individuals who have experienced the mental health recovery process. Hours of operation: 8:30 am - 4:30 pm, Monday through Friday [phone])   [facility]  [place]  [address] [place] [address]    Text CONNECT to [phone] from anywhere in the [facility], anytime, about any type of crisis. A live, trained Crisis Counselor receives the text and lets you know that they are here to listen. The volunteer Crisis Counselor will help you move from a hot moment to a cool moment. Warm Line: [phone], [phone], [phone]  If it is not quite a crisis, but you want to talk to someone, 24 hours/day, 7 days/week:  Someone to listen; someone who cares. The [facility]ness ([facility]) offers various education & support groups for you & your family. For more information visit their website at   http://[email or web address]    Dial 2-1-1 to get connected/get help. Free, confidential information & referral available 24/7: Aging Services, Child & Youth Services, Counseling, Education/Training, Food/Shelter/Clothing, Health Services, Parenting, Substance Abuse, Support Groups, Volunteer Opportunities, & much more.   Phone: 2-1-1 or [phone], Web: Yusuf Braden, Email: [email or web address]    The [place] are open to all mental health consumers in [facility] who are interested in meeting people and making new friends. They provide a friendly social atmosphere with scheduled daily activities including games, arts & crafts, discussion and education groups, vocational activities, and much more. Light refreshments are served daily. The locations are:    [place] - operated by [address]   [address] [place] [address]   Phone: [phone]   Fax: [phone]   E-mail: [email or web address]  Hours of Operation:  Monday 3:00 PM - 8:00 PM  Tuesday 12:00 PM - 8:00 PM   Wednesday 3:00 PM - 8:00 PM  Thursday 12:00 PM - 8:00 PM   Friday 3:00 PM - 8:00 PM   Saturday Closed   [facility] - operated by [address] [address]  Phone: [phone]  Fax: [phone]  Hours of Operation  Monday 12:00 PM - 8:00 PM  Tuesday 12:00 PM - 8:00 PM  Wednesday 12:00 PM - 8:00 PM   Thursday 12:00 PM - 8:00 PM  Friday 12:00 PM - 9:00 PM   Saturday 11:00 AM - 4:00 PM  [employer] transportation is available on scheduled days for transportation. Psych Rehab Program:  Modeled after the [facility] [address] [facility]  program in [place], the [employer] offers consumers interested in fulfilling work a guaranteed place to come, to belong, and to enjoy meaningful relationships as they seek the confidence and skills necessary to lead vocationally productive and socially satisfying lives. Here is their contact information:  [facility], [address]  Phone: [phone]  [email or web address]  This site is open Monday thru Thursday from 8:30 am till 4:00 pm and Fridays from 8:30 am till 3:00 pm. Consumers are encouraged to stop by for a visit. After-hour social activities are also scheduled. Support & Referral Helpline  Support and Referral Helpline is a 24-hour, 7 days-a-week, listening and referral service provided to all of [place].   Please call [phone] or Y [phone] to speak with one of our specialists. The helpline is possible through partnerships with the [address] [place] [address] [employer]. The [address] [place] [address] (formerly known as the [employer]) provides free and confidential emotional support to people in suicidal crisis or emotional distress 24 hours a day, 7 days a week, across the [facility]. The Lifeline is comprised of a national network of over 200 local crisis centers, combining custom local care and resources with national standards and best practices.

## 2023-09-29 NOTE — NURSING NOTE
Pt has been withdrawn to room, reports readiness to discharge tomorrow. Pt made aware that their father called unit. Pt reinforced that EMILIANO signed for dad is only to discuss discharge tomorrow to parents home. No further information discussed. Pt states being "optimistic" and hopeful after discharge. Denied questions or concerns at this time. Denies SI, HI, AVH.

## 2023-09-29 NOTE — NURSING NOTE
Pt is awake, appears scared, sad, denies anxiety. Pt states that they had difficulty with sleep last night. Reports being hopeful and desires a discharge tomorrow, will return to parents home. Disheveled appearance Pt denies SI, HI, AVH.

## 2023-09-29 NOTE — NURSING NOTE
Staff approached pt after she audibly was crying in her room. Offered atarax 100 mg for severe anxiety at 2008 and zyprexa 5 mg at 2154. Atarax was ineffective after an hour. Pt would not give a reason and she continues to intermittently scream in her room throughout the evening. Pt appears internally preoccupied and restless in bed. She raised her voice to say "this is torture. I don't trust my parents." She exhibits loose associations and endorses religiosity. Stating that she relies on "Father" and prays all day but does feels punished. She declined IM meds and preoccupied with discharge.

## 2023-09-29 NOTE — NURSING NOTE
Patient heard screaming in her room several times throughout the evening. When attempting to talk with her, she kicked her legs out and clenched her hands and starts shaking. Stated, "this is torture here! Lying in bed all day!" She was encouraged to walk around the unit and participate in groups but stated she doesn't want to talk and declined to leave her room. She stated she doesn't "ever" watch TV and didn't want to read books or color. "There's nothing I can do here! I don't even think there's anything I can do when I'm not here!" She was observed scratching her skin several times but stated she was not itchy.

## 2023-09-29 NOTE — PROGRESS NOTES
Status: Pt mostly seclusive to her room but had verbal outburst in the afternoon on the phone. Pt crying & screaming intermittently thorughout the evening. Pt eventually accepted PRN & slept. Medication: no change / PRN - Trazodone  D/C: Saturday - Pt signed a 72 hour notice on 9/27 @ 1630     09/29/23 0750   Team Meeting   Meeting Type Daily Rounds   Team Members Present   Team Members Present Physician;Nurse;; Other (Discipline and Name)   Physician Team Member Dr. Claudette Blakes / Dr. Марина Mckee / Chago Almonte / Mekhi Leyva Team Member Enola Rosaura / [facility] Team Member Mary Lugo / Melissa Burt / Gilbert Herrera   Other (Discipline and Name) Thania(group facilitator)   Patient/Family Present   Patient Present No   Patient's Family Present No

## 2023-09-30 VITALS
BODY MASS INDEX: 23.15 KG/M2 | HEART RATE: 82 BPM | WEIGHT: 135.6 LBS | RESPIRATION RATE: 17 BRPM | SYSTOLIC BLOOD PRESSURE: 106 MMHG | HEIGHT: 64 IN | TEMPERATURE: 98 F | OXYGEN SATURATION: 98 % | DIASTOLIC BLOOD PRESSURE: 72 MMHG

## 2023-09-30 PROCEDURE — 99239 HOSP IP/OBS DSCHRG MGMT >30: CPT | Performed by: PSYCHIATRY & NEUROLOGY

## 2023-09-30 RX ORDER — DIVALPROEX SODIUM 500 MG/1
500 TABLET, EXTENDED RELEASE ORAL DAILY
Qty: 30 TABLET | Refills: 0 | Status: SHIPPED | OUTPATIENT
Start: 2023-09-30 | End: 2023-10-30

## 2023-09-30 RX ADMIN — DIVALPROEX SODIUM 500 MG: 500 TABLET, EXTENDED RELEASE ORAL at 08:37

## 2023-09-30 NOTE — NURSING NOTE
Pt sitting in room, when discussed discharge process, pt seemed unsure. Pt reports feeling ready but states having always depended on self, states parents lack supportiveness, thought pt knows they care. Discussed open conversation with family to allow parent's to know how pt is feeling. Pt feels that they had a good conversation with father yesterday and they felt "inspired". Appears anxious, pt currently showering and will get dressed to prepare for discharge home to parents house. Pt encouraged to utilize OP services, declined, states "I don't need healthcare." Encouraged Shinto/ for support, pt declined. Discussed and highlighted Crisis Lifeline and local [address] [place] [address] services in pt's area for after discharge.

## 2023-09-30 NOTE — NURSING NOTE
This writer attempted to review discharge instructions with pt. Pt was not receptive to instructions. Pt states she will not be following up OP because she "does not need any of this". Pt provided with crisis/suicide hotline to which she replied by yelling "I'm not suicidal". Pt is tearful and rolling eyes during interaction.

## 2023-09-30 NOTE — NURSING NOTE
AVS reviewed with pt, pt able to verbalize what to do if symptoms return. Meds sent via e-prescribe. Belongings reviewed with pt, pt left with personal clothes, sneakers only. Staff escorted pt to [address] [place] [address].
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
## 2023-09-30 NOTE — QUICK NOTE
I was notified that the patient was in her room hysterically crying. Nursing had just gone into her room to try to go through discharge instructions with her, and encouraged her to get changed from her hospital gown into her normal clothing, so that she could be discharged. She was not receptive to discharge instructions. She was yelling "I am not suicidal."    The patient was seated on the edge of her bed hysterically crying. She stated that she felt like she had no family support at home and that this hospitalization was a waste of time. With RN at bedside, I asked the patient if she would like to stay for further psychiatric treatment. The patient repeatedly refused. She states that she does not need to be here and that this entire hospitalization was a waste of her time and that she has felt "locked up". The patient vehemently and explicitly once again denied suicidal ideation, intent, or plan. She stated that she was not going to harm herself or anybody else. She was able to articulate a safety plan and stated that she will call an ambulance if symptoms recur. She was also psycho educated that she can walk-in to any emergency room 24/7 and she appeared to understand this. Consistent with my impression this morning, while the patient does appear to have emotional lability, tearfulness, and some hypomanic edge, there are unfortunately no criteria present through which she can be held for further involuntary treatment via a 302, as she has no demonstrated witnessed behaviors that are a danger to herself or others. She is not willing to rescind her 72 hour notice. As such, the patient will be discharged today.

## 2023-09-30 NOTE — NURSING NOTE
Patient awake attending breakfast compliant with morning medication, She denies SI Hi AVH  at present time.  She is unsure what her plans are after discharge, she does plan to be at her parents house

## 2023-09-30 NOTE — BH TRANSITION RECORD
Contact Information: If you have any questions, concerns, pended studies, tests and/or procedures, or emergencies regarding your inpatient behavioral health visit. Please contact [facility] [facility] [phone] and ask to speak to a , nurse or physician. A contact is available 24 hours/ 7 days a week at this number. Summary of Procedures Performed During your Stay:  Below is a list of major procedures performed during your hospital stay and a summary of results:  - No major procedures performed. Pending Studies (From admission, onward)     Start     Ordered    09/30/23 0600  Valproic acid level, total  Morning draw         09/29/23 0852    09/29/23 0600  Valproic acid level, total  Morning draw         09/26/23 0924              The above Valproic Acid levels were attempted, but you refused to allow the blood draw. Please follow up on the above pending studies with your PCP and/or referring provider.

## 2023-12-30 ENCOUNTER — HOSPITAL ENCOUNTER (EMERGENCY)
Facility: HOSPITAL | Age: 33
Discharge: HOME | End: 2023-12-30
Attending: EMERGENCY MEDICINE | Admitting: EMERGENCY MEDICINE
Payer: MEDICARE

## 2023-12-30 VITALS
DIASTOLIC BLOOD PRESSURE: 72 MMHG | HEART RATE: 81 BPM | OXYGEN SATURATION: 99 % | SYSTOLIC BLOOD PRESSURE: 118 MMHG | RESPIRATION RATE: 17 BRPM | TEMPERATURE: 97.9 F

## 2023-12-30 DIAGNOSIS — F22 PARANOIA (PSYCHOSIS) (HCC): Primary | ICD-10-CM

## 2023-12-30 DIAGNOSIS — F39 MOOD DISORDER (HCC): ICD-10-CM

## 2023-12-30 LAB
AMPHETAMINES SERPL QL SCN: NEGATIVE
BARBITURATES UR QL: NEGATIVE
BENZODIAZ UR QL: NEGATIVE
COCAINE UR QL: NEGATIVE
ETHANOL EXG-MCNC: 0 MG/DL
EXT PREGNANCY TEST URINE: NEGATIVE
EXT. CONTROL: NORMAL
METHADONE UR QL: NEGATIVE
OPIATES UR QL SCN: NEGATIVE
OXYCODONE+OXYMORPHONE UR QL SCN: NEGATIVE
PCP UR QL: NEGATIVE
THC UR QL: NEGATIVE

## 2023-12-30 PROCEDURE — 99283 EMERGENCY DEPT VISIT LOW MDM: CPT

## 2023-12-30 PROCEDURE — 99284 EMERGENCY DEPT VISIT MOD MDM: CPT | Performed by: EMERGENCY MEDICINE

## 2023-12-30 PROCEDURE — 80307 DRUG TEST PRSMV CHEM ANLYZR: CPT | Performed by: EMERGENCY MEDICINE

## 2023-12-30 PROCEDURE — 82075 ASSAY OF BREATH ETHANOL: CPT | Performed by: EMERGENCY MEDICINE

## 2023-12-30 PROCEDURE — 81025 URINE PREGNANCY TEST: CPT | Performed by: EMERGENCY MEDICINE

## 2023-12-30 RX ORDER — VALPROIC ACID 250 MG/1
250 CAPSULE, LIQUID FILLED ORAL ONCE
Status: COMPLETED | OUTPATIENT
Start: 2023-12-30 | End: 2023-12-30

## 2023-12-30 RX ORDER — LORAZEPAM 1 MG/1
1 TABLET ORAL ONCE
Status: COMPLETED | OUTPATIENT
Start: 2023-12-30 | End: 2023-12-30

## 2023-12-30 RX ORDER — DIVALPROEX SODIUM 500 MG/1
500 TABLET, EXTENDED RELEASE ORAL DAILY
Qty: 30 TABLET | Refills: 0 | Status: SHIPPED | OUTPATIENT
Start: 2023-12-30 | End: 2024-01-29

## 2023-12-30 RX ADMIN — VALPROIC ACID 250 MG: 250 CAPSULE ORAL at 22:43

## 2023-12-30 RX ADMIN — LORAZEPAM 1 MG: 1 TABLET ORAL at 19:28

## 2023-12-31 NOTE — ED PROVIDER NOTES
"History  Chief Complaint   Patient presents with    Psychiatric Evaluation     Patient comes in via EMS with feelings of impending doom.  Patient states that she might be a \"little bit pregnant\" Lives at home with parents      The patient reports she called 911 today because she felt an evil that felt like an emergency, but has a hard time explaining how.  She denies thoughts of hurting herself or others.  She reports she might be pregnant but it's more of a \"spiritual pregnancy\" and she does not believe pregnancy test will show it.  She admits she was prescribed depakote previously but has not taken it in a long time.        History limited by:  Psychiatric disorder  Psychiatric Evaluation      Prior to Admission Medications   Prescriptions Last Dose Informant Patient Reported? Taking?   divalproex sodium (DEPAKOTE ER) 500 mg 24 hr tablet   No No   Sig: Take 1 tablet (500 mg total) by mouth daily   divalproex sodium (DEPAKOTE ER) 500 mg 24 hr tablet   No Yes   Sig: Take 1 tablet (500 mg total) by mouth daily      Facility-Administered Medications: None       Past Medical History:   Diagnosis Date    Anxiety     Depression     Fibroids 07/25/2022    Iron deficiency anemia due to chronic blood loss 07/25/2022    Sleep difficulties        History reviewed. No pertinent surgical history.    History reviewed. No pertinent family history.  I have reviewed and agree with the history as documented.    E-Cigarette/Vaping    E-Cigarette Use Never User      E-Cigarette/Vaping Substances    Nicotine No     THC No     CBD No     Flavoring No     Other No     Unknown No      Social History     Tobacco Use    Smoking status: Never     Passive exposure: Never    Smokeless tobacco: Never    Tobacco comments:     N/A, patient is a non-smoker.   Vaping Use    Vaping status: Never Used   Substance Use Topics    Alcohol use: Not Currently     Comment: Denies    Drug use: Never       Review of Systems   All other systems reviewed and " are negative.      Physical Exam  Physical Exam  Vitals and nursing note reviewed.   Constitutional:       General: She is not in acute distress.     Appearance: She is well-developed.      Comments: Good hygiene   HENT:      Head: Normocephalic and atraumatic.   Eyes:      Conjunctiva/sclera: Conjunctivae normal.   Cardiovascular:      Rate and Rhythm: Normal rate and regular rhythm.      Heart sounds: No murmur heard.  Pulmonary:      Effort: Pulmonary effort is normal. No respiratory distress.      Breath sounds: Normal breath sounds.   Abdominal:      Palpations: Abdomen is soft.      Tenderness: There is no abdominal tenderness.   Musculoskeletal:         General: No swelling.      Cervical back: Neck supple.   Skin:     General: Skin is warm and dry.      Capillary Refill: Capillary refill takes less than 2 seconds.   Neurological:      General: No focal deficit present.      Mental Status: She is alert.   Psychiatric:      Comments: Poor eye contact.  Poor insight.         Vital Signs  ED Triage Vitals   Temperature Pulse Respirations Blood Pressure SpO2   12/30/23 1859 12/30/23 1859 12/30/23 1946 12/30/23 1859 12/30/23 1859   97.9 °F (36.6 °C) 81 18 118/72 99 %      Temp Source Heart Rate Source Patient Position - Orthostatic VS BP Location FiO2 (%)   12/30/23 1859 12/30/23 1859 12/30/23 1859 12/30/23 1859 --   Oral Monitor Sitting Right arm       Pain Score       --                  Vitals:    12/30/23 1859   BP: 118/72   Pulse: 81   Patient Position - Orthostatic VS: Sitting         Visual Acuity      ED Medications  Medications   LORazepam (ATIVAN) tablet 1 mg (1 mg Oral Given 12/30/23 1928)   valproic acid (DEPAKENE) capsule 250 mg (250 mg Oral Given 12/30/23 2243)       Diagnostic Studies  Results Reviewed       Procedure Component Value Units Date/Time    Rapid drug screen, urine [[id number]]  (Normal) Collected: 12/30/23 1918    Lab Status: Final result Specimen: Urine, Clean Catch Updated: 12/30/23  1942     Amph/Meth UR Negative     Barbiturate Ur Negative     Benzodiazepine Urine Negative     Cocaine Urine Negative     Methadone Urine Negative     Opiate Urine Negative     PCP Ur Negative     THC Urine Negative     Oxycodone Urine Negative    Narrative:      FOR MEDICAL PURPOSES ONLY.   IF CONFIRMATION NEEDED PLEASE CONTACT THE LAB WITHIN 5 DAYS.    Drug Screen Cutoff Levels:  AMPHETAMINE/METHAMPHETAMINES  1000 ng/mL  BARBITURATES     200 ng/mL  BENZODIAZEPINES     200 ng/mL  COCAINE      300 ng/mL  METHADONE      300 ng/mL  OPIATES      300 ng/mL  PHENCYCLIDINE     25 ng/mL  THC       50 ng/mL  OXYCODONE      100 ng/mL    POCT alcohol breath test [[id number]]  (Normal) Resulted: 12/30/23 1926    Lab Status: Final result Updated: 12/30/23 1927     EXTBreath Alcohol 0.000    POCT pregnancy, urine [[id number]]  (Normal) Resulted: 12/30/23 1923    Lab Status: Final result Specimen: Urine Updated: 12/30/23 1923     EXT Preg Test, Ur Negative     Control Valid                   No orders to display              Procedures  Procedures         ED Course  ED Course as of 12/30/23 2249   Sat Dec 30, 2023   1944 Patient is medically cleared for psychiatric evaluation and/or treatment   2215 Patient talked to crisis.  She is unwilling to stay as a 201.  Both crisis and I agree that patient does not meet 302 criteria.  She requests a medication in interim while awaiting follow-up.  Will represcribe her depakote.  Risks and benefits considered, and risk of delayed treatment outweighs risks of medicines, so will represcribe.   2245 The patient gave me permission to discuss her case with her parents.  They advise they will try to get her to follow-up with psychiatry.  They report the patient previously had restless movements after a psychiatric medicationin [place].  I advised she was previously on depakote in September and seemed to tolerate it will.  I also emphasized needed for PMD and psychiatry follow-up, and that patient  can return to ED at any point for re-evaluation as needed, or if she agrees to stay in the hospital.                                             Medical Decision Making  I evaluated patient who has paranoia and delusions, but is not suicidal or homicidal.  I do not believe I have criteria to involuntarily commit her, but she would benefit from psychiatric stabilization.  Patient agrees to discuss with crisis.  Will continue to monitor.    Amount and/or Complexity of Data Reviewed  External Data Reviewed: notes.     Details: Reviewed notes from prior psychiatric admission.  Patient was discharged on depakote but declined lab tests for levels.  Labs: ordered.    Risk  Prescription drug management.  Decision regarding hospitalization.             Disposition  Final diagnoses:   Paranoia (psychosis) (HCC)     Time reflects when diagnosis was documented in both MDM as applicable and the Disposition within this note       Time User Action Codes Description Comment    12/30/2023  8:08 PM Deniz Irizarry Add [F22] Paranoia (HCC)     12/30/2023  8:08 PM Deniz Irizarry [F22] Paranoia (psychosis) (HCC)     12/30/2023  8:08 PM Deniz Irizarry Modify [F22] Paranoia (psychosis) (HCC)     12/30/2023  8:08 PM Deniz Irizarry Remove [F22] Paranoia (HCC)     12/30/2023 10:19 PM Deniz Irizarry [F39] Mood disorder (HCC)           ED Disposition       ED Disposition   Discharge    Condition   Stable    Date/Time   Sat Dec 30, 2023 10:19 PM    Comment   Cesar May should be transferred out to pending facility and has been medically cleared.               Follow-up Information       Follow up With Specialties Details Why Contact Info    Follow-up with primary care and psychiatry as discussed with crisis                Discharge Medication List as of 12/30/2023 10:20 PM        CONTINUE these medications which have CHANGED    Details   divalproex sodium (DEPAKOTE ER) 500 mg 24 hr tablet Take 1 tablet (500 mg total) by mouth  daily, Starting Sat 12/30/2023, Until Mon 1/29/2024, Normal             No discharge procedures on file.    PDMP Review         Value Time User    PDMP Reviewed  Yes 6/13/2023  7:16 PM Deniz Lance MD            ED Provider  Electronically Signed by             Deniz Irizarry MD  12/30/23 5349

## 2023-12-31 NOTE — ED NOTES
Crisis Worker spoke with patient via phone. Patient is reporting that she called the police so some one would care. Patient denies HI/SI. Patient has thoughts of just death. Patient was feeling unsafe. Patient denied being paranoid. Patient stays at home and prays all day. Patient is unemployed, Patient has no psychiatrist or therapist at this time. Patient reports that she is sleeping 4 hours a night, Patient has little appetite. Patient reports that she eat Breakfast.  Patient has no access to weapons. Patient denied having hallucinations or delusions. Patient refused to sign a 201. Patient was agreeable to outpatient services, Crisis worker provided family [place] [facility] and [facility]. Parents agreed to have Patient go see her PCP and follow up with outpatient care     Roselyn Jones MS

## 2023-12-31 NOTE — ED NOTES
....This writer discussed the patients current presentation and recommended discharge plan with Dr.joseph west.  They agree with the patient being discharged at this time with referrals and/or information about outpatient psychiatry     The patient was Instructed to follow up with their PCP      This writer and the patient completed a safety plan.  The patient was pro  vided with a copy of their safety plan with encouragement to utilize the plan following discharge.   This writer discussed discharge plans with the patient and family-Family will call out patient psychiatrist candice hawley and lupe mcpherson        SAFETY PLAN  Warning Signs patient talks about SI   Coping Skills patient prays   Outside Support family members         National Suicide Prevention Hotline:  988      [facility] [phone] - Crisis   [facility] [phone] - LVF Crisis/Mobile Crisis   [phone] - SLPF Crisis   [facility]: [phone]  [facility]: [phone]   [facility] [phone] - Crisis   [facility] [phone] - Crisis     [facility] [phone] - Crisis   [facility] [phone] - Crisis   [phone] - Peer Support Talk Line (1-9pm daily)  [phone] - Teen Support Talk Line (1-9pm daily)  [phone] - [facility] [phone]- Crisis    [facility] [phone] - Crisis   [facility] [phone] - Crisis    [facility]) [phone] - [facility] Crisis

## 2024-03-25 ENCOUNTER — TELEPHONE (OUTPATIENT)
Dept: PSYCHIATRY | Facility: CLINIC | Age: 34
End: 2024-03-25

## 2024-03-25 NOTE — TELEPHONE ENCOUNTER
"The patient's father contacted the office, seeking services. Upon review, the writer noticed a communication consent document was not completed. The writer informed the father that he would need verbal consent from the patient in order to speak with him regarding psych services. The father expressed that the patient has not been taking her medication and has been \"acting out,\" so he wants to establish services on the patient's behalf. The writer informed the father that, unfortunately, the patient has to voluntarily want the services. The writer advised the father to head down to the nearest emergency room if any concerns arose. The father verbalized his understanding.   "

## 2024-06-19 ENCOUNTER — HOSPITAL ENCOUNTER (EMERGENCY)
Facility: HOSPITAL | Age: 34
End: 2024-06-20
Attending: EMERGENCY MEDICINE | Admitting: EMERGENCY MEDICINE
Payer: MEDICARE

## 2024-06-19 DIAGNOSIS — N28.9 DECREASED RENAL FUNCTION: ICD-10-CM

## 2024-06-19 DIAGNOSIS — F22 PARANOIA (PSYCHOSIS) (HCC): ICD-10-CM

## 2024-06-19 DIAGNOSIS — R45.851 SUICIDAL IDEATION: Primary | ICD-10-CM

## 2024-06-19 DIAGNOSIS — Z00.8 MEDICAL CLEARANCE FOR PSYCHIATRIC ADMISSION: ICD-10-CM

## 2024-06-19 DIAGNOSIS — D64.9 ANEMIA: ICD-10-CM

## 2024-06-19 DIAGNOSIS — D21.9 FIBROIDS: ICD-10-CM

## 2024-06-19 LAB
AMPHETAMINES SERPL QL SCN: NEGATIVE
BARBITURATES UR QL: NEGATIVE
BENZODIAZ UR QL: NEGATIVE
COCAINE UR QL: NEGATIVE
ETHANOL EXG-MCNC: 0 MG/DL
EXT PREGNANCY TEST URINE: NEGATIVE
EXT. CONTROL: NORMAL
FENTANYL UR QL SCN: NEGATIVE
HYDROCODONE UR QL SCN: NEGATIVE
METHADONE UR QL: NEGATIVE
OPIATES UR QL SCN: NEGATIVE
OXYCODONE+OXYMORPHONE UR QL SCN: NEGATIVE
PCP UR QL: NEGATIVE
THC UR QL: NEGATIVE

## 2024-06-19 PROCEDURE — 99284 EMERGENCY DEPT VISIT MOD MDM: CPT

## 2024-06-19 PROCEDURE — 81025 URINE PREGNANCY TEST: CPT | Performed by: EMERGENCY MEDICINE

## 2024-06-19 PROCEDURE — 96372 THER/PROPH/DIAG INJ SC/IM: CPT

## 2024-06-19 PROCEDURE — NC001 PR NO CHARGE: Performed by: EMERGENCY MEDICINE

## 2024-06-19 PROCEDURE — 99285 EMERGENCY DEPT VISIT HI MDM: CPT | Performed by: EMERGENCY MEDICINE

## 2024-06-19 PROCEDURE — 82075 ASSAY OF BREATH ETHANOL: CPT | Performed by: EMERGENCY MEDICINE

## 2024-06-19 PROCEDURE — 80307 DRUG TEST PRSMV CHEM ANLYZR: CPT | Performed by: EMERGENCY MEDICINE

## 2024-06-19 RX ORDER — LORAZEPAM 2 MG/ML
2 INJECTION INTRAMUSCULAR ONCE
Status: COMPLETED | OUTPATIENT
Start: 2024-06-19 | End: 2024-06-19

## 2024-06-19 RX ORDER — HALOPERIDOL 5 MG/ML
5 INJECTION INTRAMUSCULAR ONCE
Status: COMPLETED | OUTPATIENT
Start: 2024-06-19 | End: 2024-06-19

## 2024-06-19 RX ADMIN — LORAZEPAM 2 MG: 2 INJECTION INTRAMUSCULAR; INTRAVENOUS at 21:28

## 2024-06-19 RX ADMIN — HALOPERIDOL LACTATE 5 MG: 5 INJECTION, SOLUTION INTRAMUSCULAR at 21:28

## 2024-06-19 NOTE — ED PROVIDER NOTES
"History  Chief Complaint   Patient presents with    Suicidal     Patient BIB EMS from home d/t suicidal ideations without a plan. According to EMS, pt reportedly said \"I'm just waiting for god to do something\". Pt tearful in triage. Pt states, \"I live with people that want to kill me\".      33 y/o female with hx of anxiety, depression, and prior episodes of psychosis presents to the ED via EMS from home for evaluation of suicidal ideation.  She states that she does not have a plan to hurt herself but\"I'm just waiting for God to do something.\"  She also states that she lives with her parents and believes that they want to kill her.  Per EMS report, she was also displaying paranoia and refused to have any vital signs taken.  She denies any physical symptoms or complaints.  No homicidal ideation or hallucinations.  The patient has Depakote listed in her prior medication list, however when I asked the patient she states that she does not take any medication and does not need any medications.        Prior to Admission Medications   Prescriptions Last Dose Informant Patient Reported? Taking?   divalproex sodium (DEPAKOTE ER) 500 mg 24 hr tablet   No No   Sig: Take 1 tablet (500 mg total) by mouth daily      Facility-Administered Medications: None       Past Medical History:   Diagnosis Date    Anxiety     Depression     Fibroids 07/25/2022    Iron deficiency anemia due to chronic blood loss 07/25/2022    Sleep difficulties        History reviewed. No pertinent surgical history.    History reviewed. No pertinent family history.  I have reviewed and agree with the history as documented.    E-Cigarette/Vaping    E-Cigarette Use Never User      E-Cigarette/Vaping Substances    Nicotine No     THC No     CBD No     Flavoring No     Other No     Unknown No      Social History     Tobacco Use    Smoking status: Never     Passive exposure: Never    Smokeless tobacco: Never    Tobacco comments:     N/A, patient is a non-smoker. "   Vaping Use    Vaping status: Never Used   Substance Use Topics    Alcohol use: Not Currently     Comment: Denies    Drug use: Never       Review of Systems   Constitutional:  Negative for chills and fever.   HENT:  Negative for congestion, rhinorrhea and sore throat.    Respiratory:  Negative for cough and shortness of breath.    Cardiovascular:  Negative for chest pain and palpitations.   Gastrointestinal:  Negative for abdominal pain, diarrhea, nausea and vomiting.   Genitourinary:  Negative for dysuria and hematuria.   Musculoskeletal:  Negative for back pain and neck pain.   Neurological:  Negative for dizziness, weakness, light-headedness, numbness and headaches.   Psychiatric/Behavioral:          See HPI   All other systems reviewed and are negative.      Physical Exam  Physical Exam  Vitals and nursing note reviewed.   Constitutional:       General: She is in acute distress.      Appearance: Normal appearance. She is normal weight. She is not ill-appearing.      Comments: Adult female sitting in bed, emotionally labile, intermittently shouting and crying, appears in mild distress.   HENT:      Head: Normocephalic and atraumatic.      Right Ear: External ear normal.      Left Ear: External ear normal.      Nose: Nose normal. No congestion or rhinorrhea.      Mouth/Throat:      Mouth: Mucous membranes are moist.      Pharynx: Oropharynx is clear. No oropharyngeal exudate or posterior oropharyngeal erythema.   Eyes:      Extraocular Movements: Extraocular movements intact.      Conjunctiva/sclera: Conjunctivae normal.      Pupils: Pupils are equal, round, and reactive to light.   Cardiovascular:      Rate and Rhythm: Normal rate and regular rhythm.      Pulses: Normal pulses.      Heart sounds: Normal heart sounds. No murmur heard.  Pulmonary:      Effort: Pulmonary effort is normal. No respiratory distress.      Breath sounds: Normal breath sounds. No wheezing or rales.   Abdominal:      General: Abdomen is  "flat. Bowel sounds are normal. There is no distension.      Palpations: Abdomen is soft.      Tenderness: There is no abdominal tenderness. There is no right CVA tenderness, left CVA tenderness or guarding.   Musculoskeletal:         General: No swelling or tenderness. Normal range of motion.      Cervical back: Normal range of motion and neck supple. No tenderness.   Skin:     General: Skin is warm and dry.      Capillary Refill: Capillary refill takes less than 2 seconds.   Neurological:      General: No focal deficit present.      Mental Status: She is alert and oriented to person, place, and time.   Psychiatric:      Comments: The patient is awake does not appear clinically intoxicated, however she is emotionally labile with intermittent crying and then angry shouting.  Reporting suicidal ideation without plan, stating \"I'm just waiting for God to do something.\"  She also appears mildly unkempt and appears to have signs of self-care deficit.  She has pressured speech and is tangential, requiring frequent redirection.         Vital Signs  ED Triage Vitals [06/19/24 1959]   Temperature Pulse Respirations Blood Pressure SpO2   98.9 °F (37.2 °C) 98 20 127/77 100 %      Temp Source Heart Rate Source Patient Position - Orthostatic VS BP Location FiO2 (%)   Oral Monitor Sitting Left arm --      Pain Score       --           Vitals:    06/19/24 1959   BP: 127/77   Pulse: 98   Patient Position - Orthostatic VS: Sitting         Visual Acuity      ED Medications  Medications   haloperidol lactate (HALDOL) injection 5 mg (5 mg Intramuscular Given 6/19/24 2128)   LORazepam (ATIVAN) injection 2 mg (2 mg Intramuscular Given 6/19/24 2128)       Diagnostic Studies  Results Reviewed       Procedure Component Value Units Date/Time    Rapid drug screen, urine [[id number]]  (Normal) Collected: 06/19/24 2021    Lab Status: Final result Specimen: Urine, Other Updated: 06/19/24 2042     Amph/Meth UR Negative     Barbiturate Ur Negative " "    Benzodiazepine Urine Negative     Cocaine Urine Negative     Methadone Urine Negative     Opiate Urine Negative     PCP Ur Negative     THC Urine Negative     Oxycodone Urine Negative     Fentanyl Urine Negative     HYDROCODONE URINE Negative    Narrative:      FOR MEDICAL PURPOSES ONLY.   IF CONFIRMATION NEEDED PLEASE CONTACT THE LAB WITHIN 5 DAYS.    Drug Screen Cutoff Levels:  AMPHETAMINE/METHAMPHETAMINES  1000 ng/mL  BARBITURATES     200 ng/mL  BENZODIAZEPINES     200 ng/mL  COCAINE      300 ng/mL  METHADONE      300 ng/mL  OPIATES      300 ng/mL  PHENCYCLIDINE     25 ng/mL  THC       50 ng/mL  OXYCODONE      100 ng/mL  FENTANYL      5 ng/mL  HYDROCODONE     300 ng/mL    POCT pregnancy, urine [[id number]]  (Normal) Resulted: 06/19/24 2026    Lab Status: Final result Specimen: Urine Updated: 06/19/24 2026     EXT Preg Test, Ur Negative     Control Valid    POCT alcohol breath test [[id number]]  (Normal) Resulted: 06/19/24 2006    Lab Status: Final result Updated: 06/19/24 2006     EXTBreath Alcohol 0.00                   No orders to display              Procedures  Procedures         ED Course  ED Course as of 06/20/24 0705   Wed Jun 19, 2024 2125 While I was evaluating another patient in another part of the emergency department, the patient became increasingly agitated, screaming, yelling and was not redirectable, saying \"You are all evil and possessed by Satan.\"  Patient was assessed by Dr. Anderson and she was given Haldol and Ativan IM.                               SBIRT 22yo+      Flowsheet Row Most Recent Value   Initial Alcohol Screen: US AUDIT-C     1. How often do you have a drink containing alcohol? 4 Filed at: 06/19/2024 2004   2. How many drinks containing alcohol do you have on a typical day you are drinking?  1 Filed at: 06/19/2024 2004   3b. FEMALE Any Age, or MALE 65+: How often do you have 4 or more drinks on one occassion? 0 Filed at: 06/19/2024 2004   Audit-C Score 5 Filed at: 06/19/2024 " "2004   WINNIE: How many times in the past year have you...    Used an illegal drug or used a prescription medication for non-medical reasons? Never Filed at: 06/19/2024 2004                      Medical Decision Making  35 y/o female with hx of anxiety, depression, and prior episodes of psychosis presents to the ED via EMS from home for evaluation of suicidal ideation.  She states that she does not have a plan to hurt herself but\"I'm just waiting for God to do something.\"  She also states that she lives with her parents and believes that they want to kill her.  Per EMS report, she was also displaying paranoia and refused to have any vital signs taken.  She denies any physical symptoms or complaints.  No homicidal ideation or hallucinations.  The patient has Depakote listed in her prior medication list, however when I asked the patient she states that she does not take any medication and does not need any medications.    Vital signs reviewed. The patient is awake does not appear clinically intoxicated, however she is emotionally labile with intermittent crying and then angry shouting.  Reporting suicidal ideation without plan, stating \"I'm just waiting for God to do something.\"  She also appears mildly unkempt and appears to have signs of self-care deficit.  She has pressured speech and is tangential, requiring frequent redirection.  See physical exam documentation for full exam findings.  Differential diagnosis includes but is not limited to depression and/or bipolar disorder with psychosis.  The patient is suicidal and also appears to have features of self-care deficit and is displaying erratic behavior.  Plan to offer voluntary admission for behavioral health stabilization, however if the patient refuses voluntary admission I plan to proceed with two-physician 302 involuntary admission as I believe the patient poses a danger to herself and requires inpatient stabilization and care. While I was evaluating another " "patient in another part of the emergency department, the patient became increasingly agitated, screaming, yelling and was not redirectable, saying \"You are all evil and possessed by Satan.\"  Patient was assessed by Dr. Anderson and she was given Haldol and Ativan IM.  Patient tolerated medications and did not require any physical restraints.  Crisis consulted.  Care for the patient was signed over at end of shift, pending acceptance and transfer for inpatient behavioral health.    Amount and/or Complexity of Data Reviewed  Labs: ordered.             Disposition  Final diagnoses:   Suicidal ideation   Paranoia (psychosis) (HCC)     Time reflects when diagnosis was documented in both MDM as applicable and the Disposition within this note       Time User Action Codes Description Comment    6/19/2024  8:10 PM Los Rice [R45.851] Suicidal ideation     6/19/2024  8:10 PM Los Rice [F22] Paranoia (psychosis) ([facility])           ED Disposition       ED Disposition   Transfer to Behavioral [facility]    Condition   --    Date/Time   Thu Jun 20, 2024 0705    Comment   Cesra May should be transferred out to behavioral health and has been medically cleared.               MD Documentation      Flowsheet Row Most Recent Value   Sending MD Cedrick Rice MD          Follow-up Information    None         Patient's Medications   Discharge Prescriptions    No medications on file       No discharge procedures on file.    PDMP Review         Value Time User    PDMP Reviewed  Yes 6/13/2023  7:16 PM Deniz Lance MD            ED Provider  Electronically Signed by             Los Rice MD  06/20/24 0705    "

## 2024-06-19 NOTE — LETTER
[facility] EMERGENCY DEPARTMENT  [address] [place] [id number]  Dept: [phone]      EMTALA TRANSFER CONSENT    NAME Cesar May                                         1990                              MRN [id number]    I have been informed of my rights regarding examination, treatment, and transfer   by Dr. Deniz Morgan DO    Benefits: Continuity of care, Other benefits (Include comment)_______________________, Specialized equipment and/or services available at the receiving facility (Include comment)________________________ (inpatient mental health 201)    Risks: Possible worsening of condition or death during transfer, Increased discomfort during transfer, Potential deterioration of medical condition, Potential for delay in receiving treatment      Consent for Transfer:  I acknowledge that my medical condition has been evaluated and explained to me by the emergency department physician or other qualified medical person and/or my attending physician, who has recommended that I be transferred to the service of  Accepting Physician: Dr. Flynn at Accepting Facility Name, City & State : [facility] 2. The above potential benefits of such transfer, the potential risks associated with such transfer, and the probable risks of not being transferred have been explained to me, and I fully understand them.  The doctor has explained that, in my case, the benefits of transfer outweigh the risks.  I agree to be transferred.      I authorize the performance of emergency medical procedures and treatments upon me in both transit and upon arrival at the receiving facility.  Additionally, I authorize the release of any and all medical records to the receiving facility and request they be transported with me, if possible.  I understand that the safest mode of transportation during a medical emergency is an ambulance and that the Hospital advocates the use of this mode of transport. Risks of  traveling to the receiving facility by car, including absence of medical control, life sustaining equipment, such as oxygen, and medical personnel has been explained to me and I fully understand them.      (SALVADOR CORRECT BOX BELOW)  [  ]  I consent to the stated transfer and to be transported by ambulance/helicopter.  [  ]  I consent to the stated transfer, but refuse transportation by ambulance and accept full responsibility for my transportation by car.  I understand the risks of non-ambulance transfers and I exonerate the Hospital and its staff from any deterioration in my condition that results from this refusal.    X___________________________________________    DATE  24  TIME________  Signature of patient or legally responsible individual signing on patient behalf           RELATIONSHIP TO PATIENT_________________________          Provider Certification    NAME Cesar May                                        [facility] 1990                              MRN [id number]    A medical screening exam was performed on the above named patient.  Based on the examination:    Condition Necessitating Transfer The primary encounter diagnosis was Suicidal ideation. Diagnoses of Paranoia (psychosis) (HCC), Medical clearance for psychiatric admission, Anemia, Decreased renal function, and Fibroids were also pertinent to this visit.    Patient Condition: The patient has been stabilized such that within reasonable medical probability, no material deterioration of the patient condition or the condition of the unborn child(winston) is likely to result from the transfer    Reason for Transfer: Level of Care needed not available at this facility, No bed available at level of patient's needs, Other (Include comment)____________________ (inpatient mental health 201)    Transfer Requirements: Facility [facility] 2W   Space available and qualified personnel available for treatment as acknowledged by Crisis  Agreed to accept transfer and to  provide appropriate medical treatment as acknowledged by       Dr. Flynn  Appropriate medical records of the examination and treatment of the patient are provided at the time of transfer   STAFF INITIAL WHEN COMPLETED _______  Transfer will be performed by qualified personnel from [facility]  and appropriate transfer equipment as required, including the use of necessary and appropriate life support measures.    Provider Certification: I have examined the patient and explained the following risks and benefits of being transferred/refusing transfer to the patient/family:  General risk, such as traffic hazards, adverse weather conditions, rough terrain or turbulence, possible failure of equipment (including vehicle or aircraft), or consequences of actions of persons outside the control of the transport personnel, Unanticipated needs of medical equipment and personnel during transport, Risk of worsening condition, The possibility of a transport vehicle being unavailable, The patient is stable for psychiatric transfer because they are medically stable, and is protected from harming him/herself or others during transport      Based on these reasonable risks and benefits to the patient and/or the unborn child(winston), and based upon the information available at the time of the patient’s examination, I certify that the medical benefits reasonably to be expected from the provision of appropriate medical treatments at another medical facility outweigh the increasing risks, if any, to the individual’s medical condition, and in the case of labor to the unborn child, from effecting the transfer.    X____________________________________________ DATE 06/20/24        TIME_______      ORIGINAL - SEND TO MEDICAL RECORDS   COPY - SEND WITH PATIENT DURING TRANSFER

## 2024-06-20 ENCOUNTER — HOSPITAL ENCOUNTER (INPATIENT)
Facility: HOSPITAL | Age: 34
LOS: 3 days | Discharge: HOME | DRG: 753 | End: 2024-06-23
Attending: STUDENT IN AN ORGANIZED HEALTH CARE EDUCATION/TRAINING PROGRAM | Admitting: STUDENT IN AN ORGANIZED HEALTH CARE EDUCATION/TRAINING PROGRAM
Payer: COMMERCIAL

## 2024-06-20 VITALS
OXYGEN SATURATION: 100 % | RESPIRATION RATE: 16 BRPM | DIASTOLIC BLOOD PRESSURE: 62 MMHG | TEMPERATURE: 98.9 F | SYSTOLIC BLOOD PRESSURE: 95 MMHG | HEART RATE: 89 BPM

## 2024-06-20 DIAGNOSIS — D21.9 FIBROIDS: ICD-10-CM

## 2024-06-20 DIAGNOSIS — F31.9 BIPOLAR DISORDER WITH PSYCHOTIC FEATURES (HCC): Primary | ICD-10-CM

## 2024-06-20 DIAGNOSIS — D64.9 ANEMIA: ICD-10-CM

## 2024-06-20 DIAGNOSIS — Z00.8 MEDICAL CLEARANCE FOR PSYCHIATRIC ADMISSION: ICD-10-CM

## 2024-06-20 DIAGNOSIS — N28.9 DECREASED RENAL FUNCTION: ICD-10-CM

## 2024-06-20 PROCEDURE — 99245 OFF/OP CONSLTJ NEW/EST HI 55: CPT | Performed by: PSYCHIATRY & NEUROLOGY

## 2024-06-20 RX ORDER — LORAZEPAM 2 MG/ML
2 INJECTION INTRAMUSCULAR
Status: DISCONTINUED | OUTPATIENT
Start: 2024-06-20 | End: 2024-06-23 | Stop reason: HOSPADM

## 2024-06-20 RX ORDER — AMOXICILLIN 250 MG
1 CAPSULE ORAL DAILY PRN
Status: CANCELLED | OUTPATIENT
Start: 2024-06-20

## 2024-06-20 RX ORDER — DIVALPROEX SODIUM 500 MG/1
500 TABLET, EXTENDED RELEASE ORAL
Status: DISCONTINUED | OUTPATIENT
Start: 2024-06-20 | End: 2024-06-20 | Stop reason: HOSPADM

## 2024-06-20 RX ORDER — LORAZEPAM 2 MG/ML
1 INJECTION INTRAMUSCULAR EVERY 4 HOURS PRN
Status: CANCELLED | OUTPATIENT
Start: 2024-06-20

## 2024-06-20 RX ORDER — BISACODYL 10 MG
10 SUPPOSITORY, RECTAL RECTAL DAILY PRN
Status: CANCELLED | OUTPATIENT
Start: 2024-06-20

## 2024-06-20 RX ORDER — OLANZAPINE 10 MG/1
10 TABLET, ORALLY DISINTEGRATING ORAL
Status: DISCONTINUED | OUTPATIENT
Start: 2024-06-20 | End: 2024-06-23 | Stop reason: HOSPADM

## 2024-06-20 RX ORDER — MINERAL OIL AND PETROLATUM 150; 830 MG/G; MG/G
OINTMENT OPHTHALMIC 4 TIMES DAILY PRN
Status: DISCONTINUED | OUTPATIENT
Start: 2024-06-20 | End: 2024-06-23 | Stop reason: HOSPADM

## 2024-06-20 RX ORDER — BENZTROPINE MESYLATE 1 MG/1
1 TABLET ORAL 2 TIMES DAILY PRN
Status: DISCONTINUED | OUTPATIENT
Start: 2024-06-20 | End: 2024-06-23 | Stop reason: HOSPADM

## 2024-06-20 RX ORDER — ACETAMINOPHEN 325 MG/1
975 TABLET ORAL EVERY 6 HOURS PRN
Status: DISCONTINUED | OUTPATIENT
Start: 2024-06-20 | End: 2024-06-23 | Stop reason: HOSPADM

## 2024-06-20 RX ORDER — HALOPERIDOL 2 MG/1
2 TABLET ORAL
Status: DISCONTINUED | OUTPATIENT
Start: 2024-06-20 | End: 2024-06-23 | Stop reason: HOSPADM

## 2024-06-20 RX ORDER — HALOPERIDOL 5 MG/1
5 TABLET ORAL
Status: CANCELLED | OUTPATIENT
Start: 2024-06-20

## 2024-06-20 RX ORDER — LORAZEPAM 2 MG/ML
1 INJECTION INTRAMUSCULAR EVERY 4 HOURS PRN
Status: DISCONTINUED | OUTPATIENT
Start: 2024-06-20 | End: 2024-06-23 | Stop reason: HOSPADM

## 2024-06-20 RX ORDER — DIVALPROEX SODIUM 500 MG/1
500 TABLET, EXTENDED RELEASE ORAL
Status: CANCELLED | OUTPATIENT
Start: 2024-06-20

## 2024-06-20 RX ORDER — ACETAMINOPHEN 325 MG/1
650 TABLET ORAL EVERY 4 HOURS PRN
Status: CANCELLED | OUTPATIENT
Start: 2024-06-20

## 2024-06-20 RX ORDER — MAGNESIUM HYDROXIDE/ALUMINUM HYDROXICE/SIMETHICONE 120; 1200; 1200 MG/30ML; MG/30ML; MG/30ML
30 SUSPENSION ORAL EVERY 4 HOURS PRN
Status: DISCONTINUED | OUTPATIENT
Start: 2024-06-20 | End: 2024-06-23 | Stop reason: HOSPADM

## 2024-06-20 RX ORDER — LORAZEPAM 2 MG/ML
2 INJECTION INTRAMUSCULAR
Status: CANCELLED | OUTPATIENT
Start: 2024-06-20

## 2024-06-20 RX ORDER — ACETAMINOPHEN 325 MG/1
650 TABLET ORAL EVERY 6 HOURS PRN
Status: DISCONTINUED | OUTPATIENT
Start: 2024-06-20 | End: 2024-06-23 | Stop reason: HOSPADM

## 2024-06-20 RX ORDER — BENZTROPINE MESYLATE 1 MG/ML
1 INJECTION INTRAMUSCULAR; INTRAVENOUS 2 TIMES DAILY PRN
Status: CANCELLED | OUTPATIENT
Start: 2024-06-20

## 2024-06-20 RX ORDER — ACETAMINOPHEN 325 MG/1
975 TABLET ORAL EVERY 6 HOURS PRN
Status: CANCELLED | OUTPATIENT
Start: 2024-06-20

## 2024-06-20 RX ORDER — PROPRANOLOL HYDROCHLORIDE 20 MG/1
10 TABLET ORAL EVERY 8 HOURS PRN
Status: CANCELLED | OUTPATIENT
Start: 2024-06-20

## 2024-06-20 RX ORDER — MAGNESIUM HYDROXIDE/ALUMINUM HYDROXICE/SIMETHICONE 120; 1200; 1200 MG/30ML; MG/30ML; MG/30ML
30 SUSPENSION ORAL EVERY 4 HOURS PRN
Status: CANCELLED | OUTPATIENT
Start: 2024-06-20

## 2024-06-20 RX ORDER — TRAZODONE HYDROCHLORIDE 50 MG/1
50 TABLET ORAL
Status: CANCELLED | OUTPATIENT
Start: 2024-06-20

## 2024-06-20 RX ORDER — OLANZAPINE 5 MG/1
10 TABLET, ORALLY DISINTEGRATING ORAL
Status: DISCONTINUED | OUTPATIENT
Start: 2024-06-20 | End: 2024-06-20 | Stop reason: HOSPADM

## 2024-06-20 RX ORDER — BENZTROPINE MESYLATE 1 MG/ML
1 INJECTION INTRAMUSCULAR; INTRAVENOUS
Status: DISCONTINUED | OUTPATIENT
Start: 2024-06-20 | End: 2024-06-23 | Stop reason: HOSPADM

## 2024-06-20 RX ORDER — AMOXICILLIN 250 MG
1 CAPSULE ORAL DAILY PRN
Status: DISCONTINUED | OUTPATIENT
Start: 2024-06-20 | End: 2024-06-23 | Stop reason: HOSPADM

## 2024-06-20 RX ORDER — HYDROXYZINE 50 MG/1
50 TABLET, FILM COATED ORAL
Status: DISCONTINUED | OUTPATIENT
Start: 2024-06-20 | End: 2024-06-23 | Stop reason: HOSPADM

## 2024-06-20 RX ORDER — HALOPERIDOL 5 MG/ML
2.5 INJECTION INTRAMUSCULAR
Status: DISCONTINUED | OUTPATIENT
Start: 2024-06-20 | End: 2024-06-23 | Stop reason: HOSPADM

## 2024-06-20 RX ORDER — HALOPERIDOL 5 MG/1
5 TABLET ORAL
Status: DISCONTINUED | OUTPATIENT
Start: 2024-06-20 | End: 2024-06-23 | Stop reason: HOSPADM

## 2024-06-20 RX ORDER — LORAZEPAM 1 MG/1
1 TABLET ORAL
Status: DISCONTINUED | OUTPATIENT
Start: 2024-06-20 | End: 2024-06-23 | Stop reason: HOSPADM

## 2024-06-20 RX ORDER — BENZTROPINE MESYLATE 1 MG/ML
0.5 INJECTION INTRAMUSCULAR; INTRAVENOUS
Status: DISCONTINUED | OUTPATIENT
Start: 2024-06-20 | End: 2024-06-23 | Stop reason: HOSPADM

## 2024-06-20 RX ORDER — BENZTROPINE MESYLATE 1 MG/ML
1 INJECTION INTRAMUSCULAR; INTRAVENOUS
Status: CANCELLED | OUTPATIENT
Start: 2024-06-20

## 2024-06-20 RX ORDER — ACETAMINOPHEN 325 MG/1
650 TABLET ORAL EVERY 6 HOURS PRN
Status: CANCELLED | OUTPATIENT
Start: 2024-06-20

## 2024-06-20 RX ORDER — OLANZAPINE 5 MG/1
10 TABLET, ORALLY DISINTEGRATING ORAL
Status: CANCELLED | OUTPATIENT
Start: 2024-06-20

## 2024-06-20 RX ORDER — HYDROXYZINE HYDROCHLORIDE 25 MG/1
50 TABLET, FILM COATED ORAL
Status: CANCELLED | OUTPATIENT
Start: 2024-06-20

## 2024-06-20 RX ORDER — LORAZEPAM 1 MG/1
1 TABLET ORAL
Status: CANCELLED | OUTPATIENT
Start: 2024-06-20

## 2024-06-20 RX ORDER — HALOPERIDOL 5 MG/ML
5 INJECTION INTRAMUSCULAR
Status: DISCONTINUED | OUTPATIENT
Start: 2024-06-20 | End: 2024-06-23 | Stop reason: HOSPADM

## 2024-06-20 RX ORDER — BENZTROPINE MESYLATE 1 MG/ML
0.5 INJECTION INTRAMUSCULAR; INTRAVENOUS
Status: CANCELLED | OUTPATIENT
Start: 2024-06-20

## 2024-06-20 RX ORDER — PROPRANOLOL HYDROCHLORIDE 10 MG/1
10 TABLET ORAL EVERY 8 HOURS PRN
Status: DISCONTINUED | OUTPATIENT
Start: 2024-06-20 | End: 2024-06-23 | Stop reason: HOSPADM

## 2024-06-20 RX ORDER — BISACODYL 10 MG
10 SUPPOSITORY, RECTAL RECTAL DAILY PRN
Status: DISCONTINUED | OUTPATIENT
Start: 2024-06-20 | End: 2024-06-23 | Stop reason: HOSPADM

## 2024-06-20 RX ORDER — HALOPERIDOL 5 MG/ML
2.5 INJECTION INTRAMUSCULAR
Status: CANCELLED | OUTPATIENT
Start: 2024-06-20

## 2024-06-20 RX ORDER — ACETAMINOPHEN 325 MG/1
650 TABLET ORAL EVERY 4 HOURS PRN
Status: DISCONTINUED | OUTPATIENT
Start: 2024-06-20 | End: 2024-06-23 | Stop reason: HOSPADM

## 2024-06-20 RX ORDER — DIVALPROEX SODIUM 500 MG/1
500 TABLET, EXTENDED RELEASE ORAL
Status: DISCONTINUED | OUTPATIENT
Start: 2024-06-20 | End: 2024-06-23 | Stop reason: HOSPADM

## 2024-06-20 RX ORDER — LORAZEPAM 2 MG/ML
1 INJECTION INTRAMUSCULAR
Status: CANCELLED | OUTPATIENT
Start: 2024-06-20

## 2024-06-20 RX ORDER — TRAZODONE HYDROCHLORIDE 50 MG/1
50 TABLET ORAL
Status: DISCONTINUED | OUTPATIENT
Start: 2024-06-20 | End: 2024-06-23 | Stop reason: HOSPADM

## 2024-06-20 RX ORDER — LORAZEPAM 2 MG/ML
1 INJECTION INTRAMUSCULAR
Status: DISCONTINUED | OUTPATIENT
Start: 2024-06-20 | End: 2024-06-23 | Stop reason: HOSPADM

## 2024-06-20 RX ORDER — BENZTROPINE MESYLATE 1 MG/1
1 TABLET ORAL 2 TIMES DAILY PRN
Status: CANCELLED | OUTPATIENT
Start: 2024-06-20

## 2024-06-20 RX ORDER — BENZTROPINE MESYLATE 1 MG/ML
1 INJECTION INTRAMUSCULAR; INTRAVENOUS 2 TIMES DAILY PRN
Status: DISCONTINUED | OUTPATIENT
Start: 2024-06-20 | End: 2024-06-23 | Stop reason: HOSPADM

## 2024-06-20 RX ORDER — HALOPERIDOL 5 MG/ML
5 INJECTION INTRAMUSCULAR
Status: CANCELLED | OUTPATIENT
Start: 2024-06-20

## 2024-06-20 RX ORDER — HYDROXYZINE HYDROCHLORIDE 25 MG/1
25 TABLET, FILM COATED ORAL
Status: CANCELLED | OUTPATIENT
Start: 2024-06-20

## 2024-06-20 RX ORDER — HYDROXYZINE HYDROCHLORIDE 25 MG/1
25 TABLET, FILM COATED ORAL
Status: DISCONTINUED | OUTPATIENT
Start: 2024-06-20 | End: 2024-06-23 | Stop reason: HOSPADM

## 2024-06-20 RX ORDER — MINERAL OIL AND PETROLATUM 150; 830 MG/G; MG/G
OINTMENT OPHTHALMIC 4 TIMES DAILY PRN
Status: CANCELLED | OUTPATIENT
Start: 2024-06-20

## 2024-06-20 RX ORDER — HALOPERIDOL 1 MG/1
2 TABLET ORAL
Status: CANCELLED | OUTPATIENT
Start: 2024-06-20

## 2024-06-20 RX ADMIN — DIVALPROEX SODIUM 500 MG: 500 TABLET, EXTENDED RELEASE ORAL at 21:20

## 2024-06-20 RX ADMIN — OLANZAPINE 10 MG: 10 TABLET, ORALLY DISINTEGRATING ORAL at 21:20

## 2024-06-20 NOTE — ED NOTES
Patient ambulated to restroom with RN at this time, changed scrub top and warm blanket provided      Daija Coates RN  06/20/24 6294     Regular Diet - No restrictions

## 2024-06-20 NOTE — ED PROVIDER NOTES
I evaluated the patient as a second doctor for two doctor 302.  Patient agitated, psychotic, suicidal, screaming, nonredirectable.   She has flight of ideas.  She believes that we are possessed by Arelis.  Tells us we are going to burn in hell forever.  She has flight of ideas.    Patient is significant psychiatrically decompensated and needs inpatient psychiatric hospitalization.  Will uphold 302.        Procedures  Procedures         ED Course  ED Course as of 06/19/24 2122 Wed Jun 19, 2024 2121 Agitated.  Screaming.  Yelling.  Not redirectable.  Will give Haldol and Ativan.  Yelling at us that we are evil and possessed by Arelis.                     Kodak Anderson, DO  06/19/24 2125

## 2024-06-20 NOTE — ED NOTES
Patient is accepted at [facility] 2W.  Patient is accepted by Dr. Flynn with orders by Alf Aldridge PA-C per Hollie.    Kinga West at [facility] was updated on disposition. [facility] or  to call on arrival for authorization number.    [facility] advised of disposition for ACT 77.     Transportation is arranged with [facility].     Transportation is scheduled for 1415 with [facility].  Intake updated.   Patient may go to the floor pending requested  of 1300.      Transfer packet prepared and on chart.         Nurse report is to be called to [phone] prior to patient transfer.

## 2024-06-20 NOTE — ED NOTES
Patient requesting to showerLeilani ED tech available to accompany patient to shower      Daija Coates RN  06/20/24 4311
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
## 2024-06-20 NOTE — CONSULTS
Consultation - Behavioral Health   Cesar May 34 y.o. female MRN: [id number]  Unit/Bed#: SH 02 Encounter: [id number]    Physician Requesting Consult: Deniz Morgan DO  Reason for Consult / Principal Problem: Suicidal ideation, paranoia (psychosis)    Assessment and Plan     Assessment & Plan   Cesar May is a 34 y.o. female with a past psychiatric history of unspecified mood disorder and psychosis who presents to the ED for suicidal ideation and paranoia in the setting of medication nonadherence. Due to patient's acute paranoia and psychosis along with self-care deficit, a 2 physician 302 was petitioned.  On assessment, patient is paranoid, delusional, disheveled, slightly blunted/decreased range in affect, religiously preoccupied, circumstantial, and exhibiting poor insight and judgment.  Patient endorses decreased self-care with decreased sleep and decreased appetite/oral intake, secondary to paranoia. Patient denies any current active or passive suicidal ideation, homicidal ideation or AVH. Though, currently there is concern for patient's ability to properly care for self on discharge and keep self safe in the community given paranoia, delusions and recent agitation at risk for further decompensation.      Diagnosis: Unspecified mood disorder with psychotic features versus unspecified psychotic disorder    Recommended Treatment:   Patient currently does meet criteria for inpatient psychiatric hospitalization: patient is currently at potential risk of harming self or others  Patient agreed to sign a 201. Bed search pending.  Psychiatry will be signing off.   If contacting or consulting after hours, please call or TigerText the on-call team ([facility]: [phone]) with any questions or concerns.    Diagnoses were discussed with the patient.  Available treatment options were discussed with the patient.  Prior records were reviewed in [facility].  The assessment and plan was discussed with the primary team.    "  Risks, benefits and possible side effects of Medications:   Risks, benefits, and possible side effects of medications were explained to the patient, who verbalizes understanding.          History of Present Illness   Chief Complaint: \"It started after COVID.\"    Cesar May is a 34 y.o. female with a past psychiatric history of unspecified mood disorder and psychosis who presents to the ED for suicidal ideation and paranoia.  Due to patient's acute paranoia and psychosis along with self-care deficit, a 2 physician 302 was petitioned. Prior records were reviewed.  While in the ED, patient has been paranoid, delusional and at times agitated, reportedly telling staff \"you are going to burn in hell\" and accusing staff of being \"possessed\" by Arelis.  Patient received as needed Haldol and placed in secure holding, after which patient was observed shaking her legs and rolling her head around in a [place], screaming \"I think I am possessed.\"    Per ED crisis note by Bertha Lacey:    \"Crisis met with patient to offer an introduction and discuss next steps.  She is pleasant, cooperative, and calm.  She relates that she arrived during the evening, before dark and asked if it was morning.  Advised it was and she commented, \"I slept through the night\".  Patient explained that her stressor is living with her parents, who do not want her living there. She shares this sentiment and does not want to live there either, but has no job, no car, no income or resources.  She is 34 and reports that she has worked in the past, but her parents took her to [place] in 2021 and in the basement of a [employer], a [employer] doctor performed a ceremony on her and she has not been the same since.  She states that she since has felt like she is \"going crazy\" but denies any mental health issues.  She reports being admitted psychiatrically x2 in the past, and while she reports it was helpful and she felt improvement, each time she was sent back to her " "parents' home and that is not helpful to her.  She voiced a desire to enter a local shelter where they allow 30 days to find employment.  She would need to locate a job within walking distance because she does not have a car, but she said a shelter representative suggested a [employer] across the street and she would be willing to work there.  She claims her parents will not drive her places or get her groceries because they are frustrated with her still living there.  She states she is a prisoner there.  Mild delusional thought content noted, but limited.  She is rather articulate and educated.  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  She was advised of the plan to be evaluated by Psychiatry today (in person or via iPad) and then, pending their evaluation, Crisis will follow-up with a 303 petition submission and she will be notified of the time of the hearing tomorrow morning via Zoom, and be assisted in connecting with an  and the hearing itself, either by ED or U staff.  She was advised of and served with her rights and a basic explanation was provided. She appears to understand.  Patient was advised that if the psychiatric recommendation were to be in favor of discharge, [facility] would participate in discharge and safety planning.  She voiced understanding.       [facility] is currently working with network Psychiatric providers to identify a physician for the psychiatric assessment and 303 petition.\"    Symptoms include: paranoia, persecutory delusions, Moravian preoccupation, decreased appetite, decreased sleep, self-care deficit, 10 lbs unintentional weight gain, decreased energy, decreased motivation, anhedonia, psychomotor slowing, and passive SI.  Onset of symptoms was gradual starting 4 years ago with gradually worsening course since that time. Psychosocial Stressors: family, financial, occupational, and social.    During encounter, patient is paranoid, delusional, disheveled, religiously " "preoccupied, hyperverbal though nonpressured, and circumstantial, with poor insight and judgment, as such history was limited.  Patient reports increased stressors beginning in 2020 following COVID.  Patient reports that she graduated from [employer] with a degree in economics but lost her job due to COVID and had to move back in with her parents.  Patient states that there have been ongoing conflicts with her parents ever since.      Patient states that she feels \"trapped\" at home and like she is a \"prisoner.\"  She says that her family will not let her use their car to shop for groceries so she has to walk to the store by herself.  She states that she also feels like her parents may be out to get her.     Patient states that she also cannot live with her aunts and uncles because she states that they \"do not want me to eat.\"  When asked if she could clarify, patient states that one of her relatives made a statement to the effect of \"you eat too much\" which patient interpreted as though not wanting her to eat at all.     Patient also reports decreased appetite at home stating that she will often not eat the food prepared for her by her family if she feels it was made of \"bad intent.\"  Patient also states that she believes her family has been trying to do \"witchcraft\" against her.  She states that just prior to admission she called the police due to feeling like she was a \"prisoner.\"  She states that the police talked to her and her parents before leaving.  Patient states that she then tried to file a PFA yesterday, which was unsuccessful, and called the police again.  Patient states that this time the police brought her to the hospital.    Patient reports several episodes of psychosis in the past 4 years with at least 3 previous inpatient psychiatric hospitalizations.  Patient states that her parents took her back to [facility] in 2021 where he stayed for few weeks.  Patient states that while there she felt like a \"prisoner\" " "because they did not visit any other family and that she and her mom just stayed in the house for the most part.     Patient states that her parents took her to a [employer] for a \"deliverance service.\" Patient states that this service was held in a [employer] basement and that her parents and 8 other men were present.  Patient states that her parents told her that she did not seem happy recently and had not been doing her hair, possibly referring to depression.  Patient states that the and wrapped the red cloth around her belly spinning her around several times while praying over her.      Patient states that she felt uncomfortable and believed that the red cloth may be doing something to her uterus.  When she came back, patient states that she feels her psychiatric symptoms got worse.  Patient states that she had a \"psychotic break\" later in 2021 where she was experiencing auditory and visual hallucinations related to \"demons.\"  Patient was discharged on Zyprexa 10 mg nightly but stopped taking them shortly after. Patient states that she is currently not taking any psychiatric medications and has not taken any for a while.    Regarding depressive symptoms, patient denies any recent depressed mood but endorses decreased appetite, unintentional 10 pound weight gain over the past year, decreased energy, decreased motivation, anhedonia, psychomotor slowing and some passive SI.    Regarding tika screening, patient denies any periods of elevated/euphoric mood accompanied by significant sleep deficit, stating that the longest she has ever stayed up is 1 day during which she felt tired.  Patient does not report flight of ideas, grandiosity, pressured speech or risky/indiscrete behavior.    Currently, patient denies any active or passive SI or plan, homicidal ideation, auditory or visual hallucinations.    Psychiatric Review of Systems and PHx     Problems with sleep: yes, decreased  Appetite changes: yes, decreased,   Weight " "changes: yes, increased, reports 10 lbs unintentional weight gain over the past year  Low energy/anergy: yes  Low interest/pleasure/anhedonia: yes  Somatic symptoms: no  Anxiety/panic: no  Noemi: no  Guilt/hopeless: no  Self injurious behavior/risky behavior: no      Historical Information   Prior psychiatric diagnoses: per chart, unspecified mood disorder, psychosis  Inpatient hospitalizations: Multiple, including [facility] in 9/20/2023 and 7/20/2022 for mood disorder and psychosis respectively.  Patient also reports another inpatient stay in [place] around 2022 or 2023 for psychosis.  Suicide attempts: patient denies, though per chart review patient previously reported 1 attempt via intentional drug overdose on sleeping pills  Self-harm behaviors: patient denies  Outpatient treatment: None currently  Psychiatric medication trial: Zyprexa and Depakote (patient states she tolerated these medications well), Haldol (received during current admission in the ED, patient states she experienced side effects, felt \"possessed.\")    Substance Abuse History:    Social History     Tobacco Use    Smoking status: Never     Passive exposure: Never    Smokeless tobacco: Never    Tobacco comments:     N/A, patient is a non-smoker.   Vaping Use    Vaping status: Never Used   Substance Use Topics    Alcohol use: Not Currently     Comment: Denies    Drug use: Never      Per chart review, patient denies use of tobacco, alcohol, or illicit drugs.  UDS negative.     Family Psychiatric History:   No other known family history of psychiatric illness, suicide attempt or substance abuse.    Social History  Marital history: Single  Children: no  Living arrangement: Lives in a home with parents.  Functioning Relationships: poor relationship with parents and fearful & suspicious of most people, limited support system  Education: college graduate  Occupational History: unemployed  Other Pertinent History: None    Traumatic History:   Abuse: none " reported  Other Traumatic Events: none reported    Past Medical History:   Diagnosis Date    Anxiety     Depression     Fibroids 07/25/2022    Iron deficiency anemia due to chronic blood loss 07/25/2022    Sleep difficulties        Medical Review of Systems and MSE   tion and Medical ROS  Medical Review Of Systems:  Review of Systems - Negative except as noted in HPI    Meds/Allergies   all current active meds have been reviewed, current meds:   No current facility-administered medications for this encounter.   , and PTA meds:   Prior to Admission Medications   Prescriptions Last Dose Informant Patient Reported? Taking?   divalproex sodium (DEPAKOTE ER) 500 mg 24 hr tablet   No No   Sig: Take 1 tablet (500 mg total) by mouth daily      Facility-Administered Medications: None     Allergies   Allergen Reactions    Penicillins Other (See Comments)     Reaction Date: 21Jul2008;   Reaction Date: 21Jul2008;       Augmentin [Amoxicillin-Pot Clavulanate] Rash       Objective   Vital signs in last 24 hours:  Temp:  [98.9 °F (37.2 °C)] 98.9 °F (37.2 °C)  HR:  [98] 98  Resp:  [20] 20  BP: (127)/(77) 127/77    Risk of Harm to Self:   The following ratings are based on assessment at the time of the interview  Demographic risk factors include: none  Historical Risk Factors include: chronic psychiatric problems, chronic depressive symptoms, history of depression, history of mood disorder, chronic psychotic symptoms, history of psychosis, history of suicide attempt  Current Specific Risk Factors include:  Passive suicidal ideation without plan, diagnosis of mood disorder, chronic depressive symptoms, chronic delusions, chronic paranoid ideation, ongoing depressive symptoms, ongoing psychotic symptoms  Protective Factors: ability to make plans for the future, no current suicidal plan or intent, no substance use problems, Jewish beliefs discouraging suicide, ability to contract for safety with staff, ability to communicate with  "staff  Weapons/Firearms: none. The following steps have been taken to ensure weapons are properly secured: not applicable  Based on today's assessment, Cesar presents the following risk of harm to self: low    Risk of Harm to Others:  The following ratings are based on assessment at the time of the interview  Demographic Risk Factors include: unemployed, under age 40.  Historical Risk Factors include: none.  Current Specific Risk Factors include: recent episode of mood instability, chronic psychotic symptoms, concomitant thought disorder, multiple stressors, social difficulties, noncompliance with treatment, unstable mood disorder, current psychotic symptoms  Protective Factors: no current homicidal ideation, willing to continue psychiatric treatment, no current substance use problems, Restorationist beliefs, access to mental health treatment  Weapons/Firearms: none. The following steps have been taken to ensure weapons are properly secured: not applicable  Based on today's assessment, Cesar presents the following risk of harm to others: low    Mental Status Exam:  Appearance:  alert, good eye contact, marginal grooming/hygiene, and disheveled   Behavior:  calm, cooperative, and sitting comfortably   Motor: Mild psychomotor slowing   Speech:  spontaneous, slow, not pressured, hyperverbal, and coherent   Mood:  \"okay\"   Affect:  Decreased range, though mildly reactive at times   Thought Process:  circumstantial, racing of thoughts   Thought Content: persecutory delusions, paranoid ideation, Restorationist preoccupation   Perceptual disturbances: denies current hallucinations and does not appear to be responding to internal stimuli at this time   Risk Potential: Denies active or passive suicidal ideation or plan.  Previously endorse passive SI to staff on admission.  Denies any homicidal ideation.   Cognition: oriented to self and situation, oriented to person, place, time, and situation, appears to be of average " intelligence, attention span appeared shorter than expected for age, and cognition not formally tested   Insight:  Poor   Judgment: Poor     Laboratory results:  I have personally reviewed all pertinent laboratory/tests results.        The following portions of the patient's history were reviewed and updated as appropriate: allergies, current medications, past family history, past medical history, past social history, past surgical history, and problem list.    This note was not shared with the patient due to reasonable likelihood of causing patient harm  This note was created using dictation system. There may be translation, syntax,  or grammatical errors. If you have any questions, please contact the dictating provider.    Prasanna Saul MD, PGY-2

## 2024-06-20 NOTE — ED NOTES
Psychiatric consult order requested and placed for 303 consideration.  Paperwork expected by noon today.  302 will  24.

## 2024-06-20 NOTE — ED NOTES
"Patient found attempting to leave Secure Holding room. Pt stating, \"I think I'm possessed\" and shaking her legs and rolling her head around in a [place]. Pt tearful and screaming.      Magno Jenkins RN  06/19/24 7235    "

## 2024-06-20 NOTE — ED NOTES
Patient fed; breakfast tray delivered to bed side.  Patient calm and cooperative at this time.     Daija Coates RN  06/20/24 8223

## 2024-06-20 NOTE — ED CARE HANDOFF
Emergency Department Sign Out Note        ED Course as of 06/20/24 1515   Thu Jun 20, 2024   0654 Sign out pending placement. Pt is on a 302 and was medically cleared by prior shift.    1107 Pt has now signed a 201. Will continue to work with crisis for placement.    1238 The patient was accepted for transfer to [facility]  Medical Decision Making  Amount and/or Complexity of Data Reviewed  Labs: ordered.    Risk  Decision regarding hospitalization.            Disposition  Final diagnoses:   Suicidal ideation   Paranoia (psychosis) (HCC)     Time reflects when diagnosis was documented in both MDM as applicable and the Disposition within this note       Time User Action Codes Description Comment    6/19/2024  8:10 PM Los Rice [R45.851] Suicidal ideation     6/19/2024  8:10 PM Los Rice [F22] Paranoia (psychosis) (HCC)     6/20/2024 11:57 AM Alf Aldridge [Z00.8] Medical clearance for psychiatric admission     6/20/2024 11:57 AM Alf Aldridge [D64.9] Anemia     6/20/2024 11:57 AM Alf Aldridge [N28.9] Decreased renal function     6/20/2024 11:57 AM Alf Aldridge [D21.9] Fibroids           ED Disposition       ED Disposition   Transfer to Behavioral Health    Condition   --    Date/Time   Thu Jun 20, 2024  7:05 AM    Comment   Cesar May should be transferred out to behavioral health and has been medically cleared.               MD Documentation      Flowsheet Row Most Recent Value   Patient Condition The patient has been stabilized such that within reasonable medical probability, no material deterioration of the patient condition or the condition of the unborn child(winston) is likely to result from the transfer   Reason for Transfer Level of Care needed not available at this facility, No bed available at level of patient's needs, Other (Include comment)____________________  [inpatient mental health 201]   Benefits of Transfer Continuity of care, Other  benefits (Include comment)_______________________, Specialized equipment and/or services available at the receiving facility (Include comment)________________________  [inpatient mental health 201]   Risks of Transfer Possible worsening of condition or death during transfer, Increased discomfort during transfer, Potential deterioration of medical condition, Potential for delay in receiving treatment   Accepting Physician Dr. Flynn   Accepting Facility Name, [address]    (Name & Tel number) Crisis   Transported by (Company and Unit #) [facility]   Sending MD Deniz Morgan D.O.   Provider Certification General risk, such as traffic hazards, adverse weather conditions, rough terrain or turbulence, possible failure of equipment (including vehicle or aircraft), or consequences of actions of persons outside the control of the transport personnel, Unanticipated needs of medical equipment and personnel during transport, Risk of worsening condition, The possibility of a transport vehicle being unavailable, The patient is stable for psychiatric transfer because they are medically stable, and is protected from harming him/herself or others during transport          RN Documentation      Flowsheet Row Most Recent Value   Accepting Facility Name, [facility]   Bed Assignment Per RN    (Name & Tel number) Crisis   Report Given to RN to RN   Medications Reviewed with Next Provider of Service Yes   Transport Mode Other (Comment)  [CTS]   Transported by (Company and Unit #) [facility]   Level of Care Other (Comment)  [CTS]   Copies of Medical Records Sent History and Physical, Orders, Progress note, Transfer form, Nursing note, Labs, Med Rec form, Other (comment)  [201]   Patient Belongings Disposition Sent with patient   Transfer Date 06/20/24   Transfer Time 1415          Follow-up Information    None       Discharge Medication List as of 6/20/2024  2:19 PM        CONTINUE  these medications which have NOT CHANGED    Details   divalproex sodium (DEPAKOTE ER) 500 mg 24 hr tablet Take 1 tablet (500 mg total) by mouth daily, Starting Sat 12/30/2023, Until Mon 1/29/2024, Normal           No discharge procedures on file.       ED Provider  Electronically Signed by     Deniz Morgan DO  06/20/24 7115

## 2024-06-20 NOTE — NURSING NOTE
Attending provider notified via epic secure chat that PTA medication list is complete. Pt denies any PTA medications.

## 2024-06-20 NOTE — ED NOTES
Patient advised of final disposition and plan and voiced support.  Crisis offered to phone her parents to update them.  She declined, but did express her desire to call her parents and was provided with a telephone to do so.

## 2024-06-20 NOTE — ED NOTES
Following psychiatric assessment, Crisis spoke with Psychiatric team and agreed to offer a 201.  201 option discussed with patient who readily agreed.  Advised her of her rights and served her with 201 rights and explanation of 72 hour notice.  Advised her that we will not proceed with 303 petition.  She voiced understanding.  201 co-signed by ED Attending and forwarded to Psychiatric team, Intake, and [facility] (also notified of intended U admission / disposition). Dr. Rice (302 petitioner) was also updated via email that he would no longer need to participate in a 303 hearing.

## 2024-06-20 NOTE — ED NOTES
Patient calm and cooperative at this time. Pt placed in green scrubs and urine sample obtained.      Magno Jenkins RN  06/19/24 2024

## 2024-06-20 NOTE — PLAN OF CARE
Pt is a new admission.    Problem: Ineffective Coping  Goal: Cooperates with admission process  Description: Interventions:   - Complete admission process  Outcome: Completed     Problem: Alteration in Thoughts and Perception  Goal: Verbalize thoughts and feelings  Description: Interventions:  - Promote a nonjudgmental and trusting relationship with the patient through active listening and therapeutic communication  - Assess patient's level of functioning, behavior and potential for risk  - Engage patient in 1 on 1 interactions  - Encourage patient to express fears, feelings, frustrations, and discuss symptoms    - [place] patient to reality, help patient recognize reality-based thinking   - Administer medications as ordered and assess for potential side effects  - Provide the patient education related to the signs and symptoms of the illness and desired effects of prescribed medications  Outcome: Progressing

## 2024-06-20 NOTE — ED NOTES
302 and Act 77 were faxed to [facility].      302 faxed to Intake         Patient was chemically restrained approximately 2100

## 2024-06-20 NOTE — NURSING NOTE
"Pt is a 201 admitted for paranoid ideations and suicidal statements. UDS negative. On arrival to [facility] today she is calm and cooperative, but seemingly paranoid. Pt denies SI and states \"I didn't really want to die, I just want to leave the situation.\" Pt believes her family is performing \"witchcraft\" and \"rituals\" on the pt. Pt reports since they began doing these rituals, she has been hearing and seeing things. Denies hallucinations at the time of the interview. She denies HI. Pt is religiously focused and tangential. She was calmly discussing evil spirits, demons, the devil, hallucinations and witchcraft. Pt states she has been hospitalized before and was \"helped\" however admits she did not stay compliant with medications. Pt says she does not feel safe at home and she hopes to go to a women's shelter after this, and to find a job within walking distance. Pt was oriented to the unit and denied current questions/concerns.   "

## 2024-06-20 NOTE — ED NOTES
1 bag of belongings removed from cabinet and given to CTS crew. Pt transported to [facility].      Dyan Whyte RN  06/20/24 1417       Dyan Whyte RN  06/20/24 1412

## 2024-06-20 NOTE — NURSING NOTE
Pt arrived to the unit with the following:  *NO phone or wallet*  -t-shirt   -small hair clip  -pajama pants (strings)  -underwear   -sneakers (laces)

## 2024-06-20 NOTE — ED NOTES
"Patient becoming increasingly agitated saying, \"you're all going to burn in hell\".      Magno Jenkins RN  06/19/24 2137    "

## 2024-06-20 NOTE — ED NOTES
"Met with patient to complete the crisis intake assessment and the safety risk assessment.  Patient arrived via EMS after calling the police.  Patient reported that her family wants to kill her and she endorsed SI without a plan.  Patient was initially calm and cooperative.  She spoke softly but was tangential and responses were delayed.  Patient that she has, \"no peace, no wellness and there is spiritual evil.  She reported that her parents are \"thrilled not to give a shit about me.\"  Patient kept asking what happened to the world and why is the world evil.  She reported that she does not have a job because she doesn't fit in anywhere.  She reported that her parent's are evil, sociopathic with no humanity and no love.  Patient endorsed SI but did not report a plan.  Patient started crying in the middle of the assessment.  She reported being \"so unhappy.\"  Patient stated that she was hearing and feeling something but could not identify what is was.  Patient reported that she has an Economics degree from [employer].   She reported that she wanted to go back to [employer].  Patient did not want to sign a 201, stating that she didn't need help.  Patient has no insight of her condition.  She denied needing to take medication and denied having a Psychiatrist.  Patient was advised that the doctors were signing a 302 and she would need to go to inpatient BHU.  Patient became agitated and reported that this was evil and how could we do this.  She asked to talk to someone else and was advised that it would her involuntary admission would not change.  Patient kept saying \"Are you going to keep sending to the hospital every 6 months when it doesn't work?\" Rights were explained to the patient.  She appeared to understand.  Patient called this writer and the doctors evil and said to this writer, \"F**k you bitch, get out of here.\"  "

## 2024-06-20 NOTE — ED NOTES
Insurance Authorization for admission:   Phone call placed to Mj  Phone number: [phone].     Spoke to Aditi.     4 days approved.  Level of care: Involuntary IPBH.  Review on **. Pending placement  Authorization # **.    Facility to call upon admission      Found on PROMISe:  [facility]  ID# [id number]

## 2024-06-20 NOTE — ED NOTES
"Crisis met with patient to offer an introduction and discuss next steps.  She is pleasant, cooperative, and calm.  She relates that she arrived during the evening, before dark and asked if it was morning.  Advised it was and she commented, \"I slept through the night\".  Patient explained that her stressor is living with her parents, who do not want her living there. She shares this sentiment and does not want to live there either, but has no job, no car, no income or resources.  She is 34 and reports that she has worked in the past, but her parents took her to [place] in 2021 and in the basement of a Religion, a [employer] doctor performed a ceremony on her and she has not been the same since.  She states that she since has felt like she is \"going crazy\" but denies any mental health issues.  She reports being admitted psychiatrically x2 in the past, and while she reports it was helpful and she felt improvement, each time she was sent back to her parents' home and that is not helpful to her.  She voiced a desire to enter a local shelter where they allow 30 days to find employment.  She would need to locate a job within walking distance because she does not have a car, but she said a shelter representative suggested a [employer] across the street and she would be willing to work there.  She claims her parents will not drive her places or get her groceries because they are frustrated with her still living there.  She states she is a prisoner there.  Mild delusional thought content noted, but limited.  She is rather articulate and educated.  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  She was advised of the plan to be evaluated by Psychiatry today (in person or via iPad) and then, pending their evaluation, Crisis will follow-up with a 303 petition submission and she will be notified of the time of the hearing tomorrow morning via Zoom, and be assisted in connecting with an  and the hearing itself, either by ED or [facility] " staff.  She was advised of and served with her rights and a basic explanation was provided. She appears to understand.  Patient was advised that if the psychiatric recommendation were to be in favor of discharge, Crisis would participate in discharge and safety planning.  She voiced understanding.      [facility] is currently working with network Psychiatric providers to identify a physician for the psychiatric assessment and 303 petition.

## 2024-06-21 PROBLEM — F31.9 BIPOLAR DISORDER WITH PSYCHOTIC FEATURES (HCC): Status: ACTIVE | Noted: 2023-09-26

## 2024-06-21 PROBLEM — F39 MOOD DISORDER (HCC): Status: RESOLVED | Noted: 2023-09-26 | Resolved: 2024-06-21

## 2024-06-21 PROBLEM — F29 PSYCHOSIS (HCC): Status: RESOLVED | Noted: 2022-07-27 | Resolved: 2024-06-21

## 2024-06-21 PROCEDURE — 99223 1ST HOSP IP/OBS HIGH 75: CPT | Performed by: PSYCHIATRY & NEUROLOGY

## 2024-06-21 PROCEDURE — 99252 IP/OBS CONSLTJ NEW/EST SF 35: CPT | Performed by: PHYSICIAN ASSISTANT

## 2024-06-21 RX ADMIN — TRAZODONE HYDROCHLORIDE 50 MG: 50 TABLET ORAL at 01:40

## 2024-06-21 RX ADMIN — DIVALPROEX SODIUM 500 MG: 500 TABLET, EXTENDED RELEASE ORAL at 21:49

## 2024-06-21 NOTE — NURSING NOTE
@ 0140, RN administered Trazodone 50 mg PO for c/o insomnia. Upon follow up, pt seen calmly resting in room; PRN medication appears effective.

## 2024-06-21 NOTE — PLAN OF CARE
Problem: Alteration in Thoughts and Perception  Goal: Treatment Goal: Gain control of psychotic behaviors/thinking, reduce/eliminate presenting symptoms and demonstrate improved reality functioning upon discharge  Outcome: Progressing  Goal: Verbalize thoughts and feelings  Description: Interventions:  - Promote a nonjudgmental and trusting relationship with the patient through active listening and therapeutic communication  - Assess patient's level of functioning, behavior and potential for risk  - Engage patient in 1 on 1 interactions  - Encourage patient to express fears, feelings, frustrations, and discuss symptoms    - [place] patient to reality, help patient recognize reality-based thinking   - Administer medications as ordered and assess for potential side effects  - Provide the patient education related to the signs and symptoms of the illness and desired effects of prescribed medications  Outcome: Progressing  Goal: Refrain from acting on delusional thinking/internal stimuli  Description: Interventions:  - Monitor patient closely, per order   - Utilize least restrictive measures   - Set reasonable limits, give positive feedback for acceptable   - Administer medications as ordered and monitor of potential side effects  Outcome: Progressing  Goal: Agree to be compliant with medication regime, as prescribed and report medication side effects  Description: Interventions:  - Offer appropriate PRN medication and supervise ingestion; conduct AIMS, as needed   Outcome: Progressing  Goal: Attend and participate in unit activities, including therapeutic, recreational, and educational groups  Description: Interventions:  -Encourage Visitation and family involvement in care  Outcome: Progressing  Goal: Recognize dysfunctional thoughts, communicate reality-based thoughts at the time of discharge  Description: Interventions:  - Provide medication and psycho-education to assist patient in compliance and developing  insight into his/her illness   Outcome: Progressing  Goal: Complete daily ADLs, including personal hygiene independently, as able  Description: Interventions:  - Observe, teach, and assist patient with ADLS  - Monitor and promote a balance of rest/activity, with adequate nutrition and elimination   Outcome: Progressing

## 2024-06-21 NOTE — CMS CERTIFICATION NOTE
Recertification: Based upon physical, mental and social evaluations, I certify that inpatient psychiatric services continue to be medically necessary for this patient for a duration of 3 midnights for the treatment of  Bipolar disorder with psychotic features (HCC) Available alternative community resources still do not meet the patient's mental health care needs. I further attest that an established written individualized plan of care has been updated and is outlined in the patient's medical records.

## 2024-06-21 NOTE — NURSING NOTE
Pt napped after breakfast, awake and assessed by writer at bedside. Pt denies SI, HI, AVH, states that provider encouraged pt to take Depakote so pt plans to do so. Reports sleeping well last night. Denies kaykay questions or concerns at this time. Reinforced staff availability.    Self Exam: Abdomen soft, non-tender to palpatation, non-distended , Self Exam: Abdomen soft, non-tender to palpatation, non-distended

## 2024-06-21 NOTE — CONSULTS
[facility]  Consult  Name: Cesar May 34 y.o. female I MRN: [id number]  Unit/Bed#: -02 I Date of Admission: 6/20/2024   Date of Service: 6/21/2024 I Hospital Day: 1    Inpatient consult for Medical Clearance for  patient  Consult performed by: Gail Soria PA-C  Consult ordered by: Alf Aldridge PA-C          Assessment & Plan   Medical clearance for psychiatric admission  Assessment & Plan  Vital signs stable at time of assessment  Refused labs, EKG this morning.  Reattempt if patient agreeable  Patient appears medically stable at this time for inpatient psychiatric treatment    * Bipolar disorder with psychotic features (HCC)  Assessment & Plan  Presented to the emergency department with paranoia, suicidal statements  Further plan per psychiatry           Recommendations for Discharge:  Follow up with PCP after discharge    Total Time Spent on Date of Encounter in care of patient:  mins. This time was spent on one or more of the following: performing physical exam; counseling and coordination of care; obtaining or reviewing history; documenting in the medical record; reviewing/ordering tests, medications or procedures; communicating with other healthcare professionals and discussing with patient's family/caregivers.    Collaboration of Care: Were Recommendations Directly Discussed with Primary Treatment Team? No    History of Present Illness:  Cesar May is a 34 y.o. female who is originally admitted to the psychiatry service due to paranoid ideations, suicidal statements. We are consulted for medical clearance.    Past medical history significant for bipolar disorder.  Patient presented to the emergency department due to reported paranoia, making suicidal statements.  Currently declines any physical complaints this morning.  Of note did refuse labs and EKG.    Review of Systems:  Review of Systems   Constitutional:  Negative for chills, fatigue, fever and  unexpected weight change.   HENT:  Negative for congestion, sore throat and trouble swallowing.    Eyes:  Negative for photophobia, pain and visual disturbance.   Respiratory:  Negative for cough, shortness of breath and wheezing.    Cardiovascular:  Negative for chest pain, palpitations and leg swelling.   Gastrointestinal:  Negative for abdominal pain, constipation, diarrhea, nausea and vomiting.   Endocrine: Negative for polyuria.   Genitourinary:  Negative for difficulty urinating, dysuria, flank pain, hematuria and urgency.   Musculoskeletal:  Negative for back pain, myalgias, neck pain and neck stiffness.   Skin:  Negative for pallor and rash.   Neurological:  Negative for dizziness, tremors, syncope, weakness, light-headedness, numbness and headaches.   Hematological:  Does not bruise/bleed easily.   Psychiatric/Behavioral:  Positive for agitation. Negative for confusion.        Past Medical and Surgical History:   Past Medical History:   Diagnosis Date    Anxiety     Depression     Fibroids 07/25/2022    Iron deficiency anemia due to chronic blood loss 07/25/2022    Sleep difficulties        No past surgical history on file.    Meds/Allergies:  all medications and allergies reviewed    Allergies:   Allergies   Allergen Reactions    Penicillins Other (See Comments)     Reaction Date: 21Jul2008;   Reaction Date: 21Jul2008;       Augmentin [Amoxicillin-Pot Clavulanate] Rash       Social History:  Marital Status: Single  Substance Use History:   Social History     Substance and Sexual Activity   Alcohol Use Not Currently    Comment: Denies     Social History     Tobacco Use   Smoking Status Never    Passive exposure: Never   Smokeless Tobacco Never   Tobacco Comments    N/A, patient is a non-smoker.     Social History     Substance and Sexual Activity   Drug Use Never       Family History:  History reviewed. No pertinent family history.    Physical Exam:   Vitals:   Blood Pressure: 112/82 (06/21/24 0733)  Pulse:  "91 (06/21/24 0733)  Temperature: 98.3 °F (36.8 °C) (06/21/24 0733)  Temp Source: Tympanic (06/21/24 0733)  Respirations: 16 (06/21/24 0733)  Height: 5' 4\" (162.6 cm) (06/20/24 1511)  Weight - Scale: 70.3 kg (155 lb) (06/20/24 1511)  SpO2: 100 % (06/20/24 1911)    Physical Exam  Vitals and nursing note reviewed.   Constitutional:       Appearance: Normal appearance.      Comments: No acute distress   HENT:      Head: Normocephalic.   Eyes:      General: No scleral icterus.     Extraocular Movements: Extraocular movements intact.      Conjunctiva/sclera: Conjunctivae normal.   Cardiovascular:      Rate and Rhythm: Normal rate and regular rhythm.   Pulmonary:      Effort: Pulmonary effort is normal.      Breath sounds: Normal breath sounds. No wheezing, rhonchi or rales.   Abdominal:      General: Bowel sounds are normal.      Palpations: Abdomen is soft.      Tenderness: There is no abdominal tenderness. There is no guarding or rebound.   Musculoskeletal:         General: No swelling, tenderness or deformity.      Cervical back: Normal range of motion.      Comments: Able to move upper/lower extremities bilaterally, no edema   Skin:     General: Skin is warm and dry.   Neurological:      Mental Status: She is alert and oriented to person, place, and time.   Psychiatric:         Mood and Affect: Affect is angry.          Additional Data:   Lab Results:                    Lab Results   Component Value Date    HGBA1C 4.5 07/03/2023    HGBA1C 4.9 08/01/2022               Imaging: No pertinent imaging reviewed.  No orders to display       EKG, Pathology, and Other Studies Reviewed on Admission:   EKG: No EKG obtained.  Not yet completed    ** Please Note: This note may have been constructed using a voice recognition system. **        "

## 2024-06-21 NOTE — PLAN OF CARE
Problem: DISCHARGE PLANNING  Goal: Discharge to home or other facility with appropriate resources  Description: INTERVENTIONS:  - Identify barriers to discharge w/patient and caregiver  - Arrange for needed discharge resources and transportation as appropriate  - Identify discharge learning needs (meds, wound care, etc.)  - Arrange for interpretive services to assist at discharge as needed  - Refer to Case Management Department for coordinating discharge planning if the patient needs post-hospital services based on physician/advanced practitioner order or complex needs related to functional status, cognitive ability, or social support system  Outcome: Adequate for Discharge  Note: Pt met with CM to complete intake; she declined to sign any ROIs.

## 2024-06-21 NOTE — PLAN OF CARE
Pt has attended some therapeutic groups and other unit activities.    Problem: Ineffective Coping  Goal: Identifies healthy coping skills  Outcome: Progressing  Goal: Participates in unit activities  Description: Interventions:  - Provide therapeutic environment   - Provide required programming   - Redirect inappropriate behaviors   Outcome: Progressing

## 2024-06-21 NOTE — CASE MANAGEMENT
Readmit score: 27 (RED)   Confirmed Address:     [facility]: [address] [place] [address]   Resides in the home with: Pt was living with her parents, but said that she cannot live there.  She reported she needs a shelter that is within walking distances to jobs.   Will Return Home at Discharge: Pt said no, but then said that she is going to get food stamps in a few days, so it won't be so bad if she has to return there for a few days.    Confirmed Phone Number: No Cell  (home) [phone]   Confirmed Email Address: None    Marital Status:       Children: Single     None   Family History:                        Parents:                       Siblings: Pt denied.     Pt reported her parents are evil.     Pt has a sister.   Commitment Status:         Status change:  201  Was a two - physician 302, however, psych consult for filing 303 offered 201 which she signed.    72 hour notice signed 6/20/2024 @ 1600   Admitted from:    [facility] on 06/19/2024 @ 1951   Presenting C/O:       Pt said that she called police for help & they suggested she to go to the hospital & talk to a doctor.  Pt said that she doesn't need to be psychiatrically hospitalized.    Past Inpatient Tx:    ST[facility]: September 25-30, 2023  ST[facility]: July 28 to August 5, 2022   Past Suicide Attempts:   Pt denied   Current outpatient:     Psychiatrist:    None   Therapist:    None   ACT/ICM/CPS/WRT/SC:   None   PCP:    [facility] - Dr. Maikol MACHADO [phone]   Med Hx/Concern:    Pt denied   Medications:    Pt said she was not taking medication and it wasn't necessary.    Pharmacy:    [facility] or [facility]   Spirituality/Orthodoxy:   Pt reported she is Protestant.    Education:    Pt has a masters degree in economics & policy from [employer]; she reported she went to [employer] for undergrad.    Work/Income:       Pt is not currently working.  Pt said that she got hired for a job at [employer] but needed a ride to work.   She said that her sister begrudgingly took her, but wouldn't pick her up.  Pt said that a co-worker paid for a uber for her to get home, which was over $30 & she realized she could not work there any longer.   Pt reported she as approved for food stamps prior to coming to the hospital & her card should arrive in the mail soon.   Legal:     Pt denied   Access to Firearms: Pt denied   Transportation:    Pt has a license, but no car.    Referrals Needed:   Pt declined any referrals at this time.    Transport at Discharge:   [facility]   IMM: N/A Mj Text EMILIANO: Pt declined   Emergency Contact:  Areli May(younger sister): [phone]  Alf May(father): [phone]   ROIs obtained:    None - Pt refused   Insurance:     [facility]   Audit: 2     PAWSS: 0   BAT: 0 UDS: negative      Substance Abuse: Freq. Amount Last Use Notes:   Heroin           Amp/Meth           Alcohol           Cocaine           Cannabis           Benzodiazepine           Barbituartes           Other           Tobacco             CM spoke with Pt about [facility] shelter, but Pt refused to be referred there as it is co-ed.  Pt said she would like to go to [facility].  CM said that Pt would need to outreach to them, as they typically have a waiting list & CM was not certain if Pt would need to be referred there from 2-1-1 or not.  CM also cautioned that in the past they only took single women if they were currently a fulltime student or working.  Pt said that she had called them a year ago, however, her situation at home was not so bad to warrant moving herself into a shelter.  Pt said that it has gotten to the point where living in a shelter would be the better option.  CM assisted Pt with using her cordless phone to call 211.  Pt waited on hold but the did talk to a representative.  Pt agreed to call [employer] [facility] independently from patient phones, due to time constraints.

## 2024-06-21 NOTE — NURSING NOTE
"Writer greeted pt and made pt aware that labs were scheduled for tomorrow evening to check Valproic Acid levels. Pt stated that their reason for admission is not medical and that pt does not need medications. Writer explained that Depakote was for pt's mental health. Pt states that their reason for admission is \"circumstantial \" and does not require pt to take medications. Pt encouraged to further discuss with Psychiatrist.   "

## 2024-06-21 NOTE — TREATMENT PLAN
TREATMENT PLAN REVIEW - Behavioral Health Cesar May 34 y.o. 1990 female MRN: [id number]    [facility]IOR[facility] Room / Bed: [facility] 203/[facility] 203-02 Encounter: [id number]          Admit Date/Time:  6/20/2024  3:10 PM    Treatment Team:   MD Helena Logan, CHRISTINE Aponte, CHRISTINE Acosta, CHRISTINE Swan, ANDRES Giordano RN Elizabeth Rinehart, CHRISTINE Pires RN    Diagnosis: Principal Problem:    Bipolar disorder with psychotic features (HCC)      Patient Strengths/Assets: resourceful    Patient Barriers/Limitations: noncompliant with medication, noncompliant with treatment, patient is unwilling to work on problems, poor past treatment response    Short Term Goals: decrease in psychotic symptoms, decrease in manic symptoms    Long Term Goals: free of suicidal thoughts, free of homicidal thoughts, improved insight    Progress Towards Goals: improving gradually    Recommended Treatment: medication management, patient medication education, group therapy, milieu therapy, continued Behavioral Health psychiatric evaluation/assessment process    Treatment Frequency: daily medication monitoring, group and milieu therapy daily, monitoring through interdisciplinary rounds, monitoring through weekly patient care conferences    Expected Discharge Date:  3 days (72 hr notice)    Discharge Plan: discharge to home/shelter    Treatment Plan Created/Updated By: Dr. Mcgee

## 2024-06-21 NOTE — ASSESSMENT & PLAN NOTE
Presented to the emergency department with paranoia, suicidal statements  Further plan per psychiatry

## 2024-06-21 NOTE — H&P
Psychiatric Evaluation - Behavioral Health   Cesar May 34 y.o. female MRN: [id number]  Unit/Bed#: [facility] 203-02 Encounter: [id number]    Assessment & Plan   Principal Problem:    Bipolar disorder with psychotic features (HCC)      Plan:   Continue Zyprexa 10mg PO HS and Depakote ER 500mg PO HS. Plan for VPA level tomorrow (although this will not be steady state, will obtain a level prior to conclusion of 72 hr notice on Sunday).      Given that the patient was converted from a two-physician 302 to a 201, no new behaviors have occurred or been witnessed on [facility] that would allow me to petition a new 302, as the behaviors are the same as reported in the ED. As such, will need to discharge the patient in accordance with 72 hr notice on Sunday 6/23. She would benefit from further treatment but declines to withdraw her 72 hr notice. She has not engaged in any self harming behaviors and now denies all symptoms.    Admit to [facility] inpatient psychiatry.  Medical management per SLIM.  Check admission labs: CMP, CBC, TSH, RPR, UDS, Lipid Panel, A1c.  Collaborate with case management for baseline assessment and disposition planning.  Chart reviewed and case discussed with treatment team.  Start/continue the following medications:  Current Facility-Administered Medications   Medication Dose Route Frequency Provider Last Rate    acetaminophen  650 mg Oral Q6H PRN Alf Aldridge PA-C      acetaminophen  650 mg Oral Q4H PRN Alf Aldridge PA-C      acetaminophen  975 mg Oral Q6H PRN Alf Aldridge PA-C      aluminum-magnesium hydroxide-simethicone  30 mL Oral Q4H PRN Alf Aldridge PA-C      artificial tear   Both Eyes 4x Daily PRN Alf Aldridge PA-C      haloperidol lactate  2.5 mg Intramuscular Q6H PRN Max 4/day Alf Aldridge PA-C      And    LORazepam  1 mg Intramuscular Q6H PRN Max 4/day Alf Aldridge PA-C      And    benztropine  0.5 mg Intramuscular Q6H PRN Max 4/day Alf Aldridge  PA-C      benztropine  1 mg Intramuscular BID PRN [facility], PA-C      haloperidol lactate  5 mg Intramuscular Q4H PRN Max 4/day [facility], PA-C      And    LORazepam  2 mg Intramuscular Q4H PRN Max 4/day [facility], PA-C      And    benztropine  1 mg Intramuscular Q4H PRN Max 4/day [facility], PA-C      benztropine  1 mg Oral BID PRN [facility], PA-C      bisacodyl  10 mg Rectal Daily PRN [facility], PA-C      divalproex sodium  500 mg Oral HS [facility], PA-C      haloperidol  2 mg Oral Q4H PRN Max 6/day [facility], PA-C      haloperidol  5 mg Oral Q6H PRN Max 4/day [facility], PA-C      haloperidol  5 mg Oral Q4H PRN Max 4/day [facility], PA-C      hydrOXYzine HCL  25 mg Oral Q6H PRN Max 4/day [facility], PA-C      hydrOXYzine HCL  50 mg Oral Q4H PRN Max 4/day [facility], PA-C      Or    LORazepam  1 mg Intramuscular Q4H PRN [facility], PA-C      LORazepam  1 mg Oral Q4H PRN Max 6/day [facility], PA-C      Or    LORazepam  2 mg Intramuscular Q6H PRN Max 3/day [facility], PA-C      magnesium hydroxide  30 mL Oral Daily PRN [facility], PA-C      OLANZapine  10 mg Oral HS [facility], PA-C      propranolol  10 mg Oral Q8H PRN [facility], PA-C      senna-docusate sodium  1 tablet Oral Daily PRN [facility], PA-C      traZODone  50 mg Oral HS PRN [facility], PA-C         Treatment options and alternatives were reviewed with the patient, who concurs with the above plan. Risks, benefits, and possible side effects of medications were explained to the patient, and she verbalizes understanding.      -----------------------------------    Chief Complaint: Noemi/Psychosis    History of Present Illness     Per ED provider note:  33 y/o female with hx of anxiety, depression, and prior episodes of psychosis presents to the ED via EMS from home for evaluation of suicidal ideation.  She states that she does not have a plan to hurt  "herself but\"I'm just waiting for God to do something.\"  She also states that she lives with her parents and believes that they want to kill her.  Per EMS report, she was also displaying paranoia and refused to have any vital signs taken.  She denies any physical symptoms or complaints.  No homicidal ideation or hallucinations.  The patient has Depakote listed in her prior medication list, however when I asked the patient she states that she does not take any medication and does not need any medications.    Per Crisis worker note:  Met with patient to complete the crisis intake assessment and the safety risk assessment. Patient arrived via EMS after calling the police. Patient reported that her family wants to kill her and she endorsed SI without a plan. Patient was initially calm and cooperative. She spoke softly but was tangential and responses were delayed. Patient that she has, \"no peace, no wellness and there is spiritual evil. She reported that her parents are \"thrilled not to give a shit about me.\" Patient kept asking what happened to the world and why is the world evil. She reported that she does not have a job because she doesn't fit in anywhere. She reported that her parent's are evil, sociopathic with no humanity and no love. Patient endorsed SI but did not report a plan. Patient started crying in the middle of the assessment. She reported being \"so unhappy.\" Patient stated that she was hearing and feeling something but could not identify what is was. Patient reported that she has an Economics degree from [employer]. She reported that she wanted to go back to [place]. Patient did not want to sign a 201, stating that she didn't need help. Patient has no insight of her condition. She denied needing to take medication and denied having a Psychiatrist. Patient was advised that the doctors were signing a 302 and she would need to go to inpatient BHU. Patient became agitated and reported that this was evil and how " "could we do this. She asked to talk to someone else and was advised that it would her involuntary admission would not change. Patient kept saying \"Are you going to keep sending to the hospital every 6 months when it doesn't work?\" Rights were explained to the patient. She appeared to understand. Patient called this writer and the doctors evil and said to this writer, \"F**k you bitch, get out of here.\"     On admission to Inpatient Psychiatric Unit:  Patient is a 34-year-old black female with a history of an unspecified mood disorder with prior episodes concerning for psychosis and tika, who presented initially on a 2 doc 302, later converted to a 201, with reported suicidal ideation and tika.    Symptoms prior to hospitalization include: Reported suicidal ideation that the patient now denies, Taoist preoccupation, paranoia, delusions that witchcraft has been done and that her house has an evil spirit, and decreased sleep.  Symptom severity is rated as severe.  Timeline is unknown.  Mitigating factors are family support.  Exacerbating stressors are medication and treatment nonadherence.    On initial psychiatric evaluation today, the patient is agitated and paranoid.  She intermittently yells at me.  She states that she should not be here at the hospital.  She is manic appearing and appears to have a disorganized thought process and may be psychotic at this time.  She states that she was sent to the hospital because she herself called 911 and tried to report evil acts taking place in her home, which she said stems from her father's participation in some type of witchcraft that he started in [place].  She denies suicidal ideation, intent, or plan.  She denies hallucinations.  She does endorse symptoms consistent with previous manic episodes.  She admits to medication nonadherence and has no psychiatric outpatient services.  She states \"you think I'm crazy but it's like the exorcist, how would medications help with " "that?\" She immediately signed a 72 hour notice to withdraw from treatment. While she is manic and potentially psychotic on exam, she denies all symptoms and is future oriented, inquiring about a woman's shelter. She denies HI.     Psychiatric Review Of Systems:  Medication side effects: none  Sleep: no change  Appetite: no change  Hygiene: able to tend to instrumental and basic ADLs  Anxiety Symptoms: denies  Psychotic Symptoms: denies  Depression Symptoms: denies  Manic Symptoms: +  PTSD Symptoms: denies  Suicidal Thoughts: denies  Homicidal Thoughts: denies    Medical Review Of Systems:   Patient denies headache or dizziness.   Patient denies chest pain or palpitations.  Patient denies difficulty breathing or wheezing.  Patient denies nausea, vomiting, or diarrhea.  Patient denies polyuria or polydipsia.  Patient denies weakness or numbness.  Pertinent positives as per HPI.    Historical Information     Psychiatric History:   Patient was hospitalized in September 2023 at [facility].  She was previously on Depakote at that time but has been medication noncompliant.  She has no outpatient psychiatric services.    Substance Abuse History:  Social History     Substance and Sexual Activity   Alcohol Use Not Currently    Comment: Denies     Social History     Substance and Sexual Activity   Drug Use Never       I spent time with Cesar in counseling and education on risk of substance abuse. I assessed motivation and encouraged her for treatment as appropriate.     Family History:   History reviewed. No pertinent family history.    Social History:  Patient reports living at home with father and mother  Unemployed w/ no income  No kids, single, never   No legal or  hx    Rest of social history as per below:    Social History     Socioeconomic History    Marital status: Single     Spouse name: Not on file    Number of children: Not on file    Years of education: Not on file    Highest education " "level: Not on file   Occupational History    Not on file   Tobacco Use    Smoking status: Never     Passive exposure: Never    Smokeless tobacco: Never    Tobacco comments:     N/A, patient is a non-smoker.   Vaping Use    Vaping status: Never Used   Substance and Sexual Activity    Alcohol use: Not Currently     Comment: Denies    Drug use: Never    Sexual activity: Not Currently   Other Topics Concern    Not on file   Social History Narrative    Not on file     Social Determinants of Health     Financial Resource Strain: Not on file   Food Insecurity: Not on file   Transportation Needs: Not on file   Physical Activity: Not on file   Stress: Not on file   Social Connections: Not on file   Intimate Partner Violence: Not on file   Housing Stability: Not on file       Past Medical History:   Past Medical History:   Diagnosis Date    Anxiety     Depression     Fibroids 07/25/2022    Iron deficiency anemia due to chronic blood loss 07/25/2022    Sleep difficulties         -----------------------------------  Objective    Temp:  [98.2 °F (36.8 °C)-98.3 °F (36.8 °C)] 98.3 °F (36.8 °C)  HR:  [] 91  Resp:  [16-18] 16  BP: ()/(62-93) 112/82    Mental Status Evaluation:  Appearance: disheveled, appears consistent with stated age  Motor: +psychomotor agitation, no gait abnormalities  Behavior: superficially cooperative, requires verbal redirection  Speech: loud volume  Mood: \"I shouldn't be here\"  Affect: labile, manic-appearing  Thought Process: disorganized, tangential  Thought Content: denies auditory hallucinations, denies visual hallucinations, + delusions  Risk Potential: denies suicidal ideation, plan, or intent. Denies homicidal ideation  Sensorium: Oriented to person, place, time, and situation  Cognition: cognitive ability appears intact but was not quantitatively tested  Consciousness: alert and awake  Attention: currently impaired  Insight: limited  Judgement: limited      Meds/Allergies   Allergies "   Allergen Reactions    Penicillins Other (See Comments)     Reaction Date: 21Jul2008;   Reaction Date: 21Jul2008;       Augmentin [Amoxicillin-Pot Clavulanate] Rash         Behavioral Health Medications: all current active meds have been reviewed as per above. Changes as per above. Risks, benefits, indications, and alternatives to treatment were discussed with patient.     Laboratory results:  I have personally reviewed all pertinent laboratory/tests results.  Recent Results (from the past 48 hour(s))   POCT alcohol breath test    Collection Time: 06/19/24  8:06 PM   Result Value Ref Range    EXTBreath Alcohol 0.00    Rapid drug screen, urine    Collection Time: 06/19/24  8:21 PM   Result Value Ref Range    Amph/Meth UR Negative Negative    Barbiturate Ur Negative Negative    Benzodiazepine Urine Negative Negative    Cocaine Urine Negative Negative    Methadone Urine Negative Negative    Opiate Urine Negative Negative    PCP Ur Negative Negative    THC Urine Negative Negative    Oxycodone Urine Negative Negative    Fentanyl Urine Negative Negative    HYDROCODONE URINE Negative Negative   POCT pregnancy, urine    Collection Time: 06/19/24  8:26 PM   Result Value Ref Range    EXT Preg Test, Ur Negative     Control Valid         Progress Toward Goals & Illness Status: Patient is not at goal. They are psychiatrically unstable and are not yet ready for discharge. The patient's condition currently requires active psychopharmacological medication management, interdisciplinary coordination with case management, and the utilization of adjunctive milieu and group therapy to augment psychopharmacological efficacy. The patient's risk of morbidity, and progression or decompensation of psychiatric disease, is high without this current treatment.           -----------------------------------    Risks / Benefits of Treatment:     Risks, benefits, and possible side effects of medications explained to patient. The patient verbalizes  understanding and agreement for treatment.     Counseling / Coordination of Care:     Patient's presentation on admission and proposed treatment plan were discussed with the treatment team.  Diagnosis, medication changes and treatment plan were reviewed with the patient.  Recent stressors were discussed with the patient.  Events leading to admission were reviewed with the patient.  Importance of medication and treatment compliance was reviewed with the patient.          Inpatient Psychiatric Certification:     Certification: Based upon physical, mental and social evaluations, I certify that inpatient psychiatric services are medically necessary for this patient for a duration of 5-7 midnights (exact duration TBD pending patient's response to psychiatric treatment) for the diagnosis listed above.     Available alternative community resources do not meet the patient's mental health care needs.  I further attest that an established written individualized plan of care has been implemented and is outlined in the patient's medical records.        This note has been constructed using a voice recognition system. There may be translation, syntax, or grammatical errors. If you have any questions, please contact the dictating provider.

## 2024-06-21 NOTE — ASSESSMENT & PLAN NOTE
Vital signs stable at time of assessment  Refused labs, EKG this morning.  Reattempt if patient agreeable  Patient appears medically stable at this time for inpatient psychiatric treatment

## 2024-06-21 NOTE — PROGRESS NOTES
Status: Pt is on a 201 commitment from [facility].  She was originally on a two physician 302, however, the psych consult for the 303 offered her a 201.  Pt reported called police for suicidal ideation, but denied any plan/intent.  Pt paranoid but cooperative upon arrival to the unit. She hopes to go to a women's shelter. Reviewed readmit score: 27(RED), AUDIT: 2, PAWSS: 0, BAT: 0, UDS: negative  Medication: to be reviewed / PRN - Trazodone  D/C: Sunday - Pt signed a 72 hour notice on 6/20 @ 1600     06/21/24 0750   Team Meeting   Meeting Type Daily Rounds   Team Members Present   Team Members Present Physician;Nurse;;Other (Discipline and Name)   Physician Team Member Dr. Flynn / Dr. Mcgee / ANDRES Aldridge / PA Student   Nursing Team Member Jaya / Lexis / Yaw   Care Management Team Member Sedrick / Justine / Shikha   Other (Discipline and Name) Thania(group facilitator)   Patient/Family Present   Patient Present No   Patient's Family Present No

## 2024-06-21 NOTE — NURSING NOTE
"This RN attempted blood work on this patient unsuccessfully. Encouraged patient to hydrate as her veins appear to be flat. Patient stated \"this is not a water issue! You're just incompetent!\" This RN apologized and left the room.   "

## 2024-06-22 LAB
25(OH)D3 SERPL-MCNC: 18.8 NG/ML (ref 30–100)
ALBUMIN SERPL BCG-MCNC: 4.1 G/DL (ref 3.5–5)
ALP SERPL-CCNC: 45 U/L (ref 34–104)
ALT SERPL W P-5'-P-CCNC: 10 U/L (ref 7–52)
ANION GAP SERPL CALCULATED.3IONS-SCNC: 7 MMOL/L (ref 4–13)
AST SERPL W P-5'-P-CCNC: 18 U/L (ref 13–39)
BASOPHILS # BLD AUTO: 0.1 THOUSANDS/ÂΜL (ref 0–0.1)
BASOPHILS NFR BLD AUTO: 3 % (ref 0–1)
BILIRUB SERPL-MCNC: 0.32 MG/DL (ref 0.2–1)
BUN SERPL-MCNC: 23 MG/DL (ref 5–25)
CALCIUM SERPL-MCNC: 9.4 MG/DL (ref 8.4–10.2)
CHLORIDE SERPL-SCNC: 104 MMOL/L (ref 96–108)
CHOLEST SERPL-MCNC: 150 MG/DL
CO2 SERPL-SCNC: 25 MMOL/L (ref 21–32)
CREAT SERPL-MCNC: 1.62 MG/DL (ref 0.6–1.3)
EOSINOPHIL # BLD AUTO: 0.05 THOUSAND/ÂΜL (ref 0–0.61)
EOSINOPHIL NFR BLD AUTO: 1 % (ref 0–6)
ERYTHROCYTE [DISTWIDTH] IN BLOOD BY AUTOMATED COUNT: 23.4 % (ref 11.6–15.1)
EST. AVERAGE GLUCOSE BLD GHB EST-MCNC: 100 MG/DL
GFR SERPL CREATININE-BSD FRML MDRD: 40 ML/MIN/1.73SQ M
GLUCOSE P FAST SERPL-MCNC: 90 MG/DL (ref 65–99)
GLUCOSE SERPL-MCNC: 90 MG/DL (ref 65–140)
HBA1C MFR BLD: 5.1 %
HCG SERPL QL: NEGATIVE
HCT VFR BLD AUTO: 29 % (ref 34.8–46.1)
HDLC SERPL-MCNC: 69 MG/DL
HGB BLD-MCNC: 7.3 G/DL (ref 11.5–15.4)
IMM GRANULOCYTES # BLD AUTO: 0.01 THOUSAND/UL (ref 0–0.2)
IMM GRANULOCYTES NFR BLD AUTO: 0 % (ref 0–2)
LDLC SERPL CALC-MCNC: 72 MG/DL (ref 0–100)
LYMPHOCYTES # BLD AUTO: 1.38 THOUSANDS/ÂΜL (ref 0.6–4.47)
LYMPHOCYTES NFR BLD AUTO: 37 % (ref 14–44)
MCH RBC QN AUTO: 16.3 PG (ref 26.8–34.3)
MCHC RBC AUTO-ENTMCNC: 25.5 G/DL (ref 31.4–37.4)
MCV RBC AUTO: 65 FL (ref 82–98)
MONOCYTES # BLD AUTO: 0.43 THOUSAND/ÂΜL (ref 0.17–1.22)
MONOCYTES NFR BLD AUTO: 12 % (ref 4–12)
NEUTROPHILS # BLD AUTO: 1.77 THOUSANDS/ÂΜL (ref 1.85–7.62)
NEUTS SEG NFR BLD AUTO: 47 % (ref 43–75)
NONHDLC SERPL-MCNC: 81 MG/DL
NRBC BLD AUTO-RTO: 1 /100 WBCS
PLATELET # BLD AUTO: 89 THOUSANDS/UL (ref 149–390)
POTASSIUM SERPL-SCNC: 4.3 MMOL/L (ref 3.5–5.3)
PROT SERPL-MCNC: 7 G/DL (ref 6.4–8.4)
RBC # BLD AUTO: 4.47 MILLION/UL (ref 3.81–5.12)
SODIUM SERPL-SCNC: 136 MMOL/L (ref 135–147)
TREPONEMA PALLIDUM IGG+IGM AB [PRESENCE] IN SERUM OR PLASMA BY IMMUNOASSAY: NORMAL
TRIGL SERPL-MCNC: 43 MG/DL
TSH SERPL DL<=0.05 MIU/L-ACNC: 0.57 UIU/ML (ref 0.45–4.5)
WBC # BLD AUTO: 3.74 THOUSAND/UL (ref 4.31–10.16)

## 2024-06-22 PROCEDURE — 83036 HEMOGLOBIN GLYCOSYLATED A1C: CPT | Performed by: PSYCHIATRY & NEUROLOGY

## 2024-06-22 PROCEDURE — 84703 CHORIONIC GONADOTROPIN ASSAY: CPT | Performed by: PSYCHIATRY & NEUROLOGY

## 2024-06-22 PROCEDURE — 99232 SBSQ HOSP IP/OBS MODERATE 35: CPT | Performed by: STUDENT IN AN ORGANIZED HEALTH CARE EDUCATION/TRAINING PROGRAM

## 2024-06-22 PROCEDURE — 80053 COMPREHEN METABOLIC PANEL: CPT | Performed by: PSYCHIATRY & NEUROLOGY

## 2024-06-22 PROCEDURE — 80061 LIPID PANEL: CPT | Performed by: PSYCHIATRY & NEUROLOGY

## 2024-06-22 PROCEDURE — 82306 VITAMIN D 25 HYDROXY: CPT | Performed by: PSYCHIATRY & NEUROLOGY

## 2024-06-22 PROCEDURE — 86780 TREPONEMA PALLIDUM: CPT | Performed by: PSYCHIATRY & NEUROLOGY

## 2024-06-22 PROCEDURE — 85025 COMPLETE CBC W/AUTO DIFF WBC: CPT | Performed by: PSYCHIATRY & NEUROLOGY

## 2024-06-22 PROCEDURE — 84443 ASSAY THYROID STIM HORMONE: CPT | Performed by: PSYCHIATRY & NEUROLOGY

## 2024-06-22 RX ADMIN — ACETAMINOPHEN 650 MG: 325 TABLET, FILM COATED ORAL at 08:49

## 2024-06-22 RX ADMIN — DIVALPROEX SODIUM 500 MG: 500 TABLET, EXTENDED RELEASE ORAL at 21:32

## 2024-06-22 NOTE — PLAN OF CARE
Problem: Alteration in Thoughts and Perception  Goal: Treatment Goal: Gain control of psychotic behaviors/thinking, reduce/eliminate presenting symptoms and demonstrate improved reality functioning upon discharge  Outcome: Progressing  Goal: Verbalize thoughts and feelings  Description: Interventions:  - Promote a nonjudgmental and trusting relationship with the patient through active listening and therapeutic communication  - Assess patient's level of functioning, behavior and potential for risk  - Engage patient in 1 on 1 interactions  - Encourage patient to express fears, feelings, frustrations, and discuss symptoms    - [place] patient to reality, help patient recognize reality-based thinking   - Administer medications as ordered and assess for potential side effects  - Provide the patient education related to the signs and symptoms of the illness and desired effects of prescribed medications  Outcome: Progressing  Goal: Complete daily ADLs, including personal hygiene independently, as able  Description: Interventions:  - Observe, teach, and assist patient with ADLS  - Monitor and promote a balance of rest/activity, with adequate nutrition and elimination   Outcome: Progressing     Problem: Ineffective Coping  Goal: Identifies healthy coping skills  Outcome: Progressing  Goal: Demonstrates healthy coping skills  Outcome: Progressing  Goal: Participates in unit activities  Description: Interventions:  - Provide therapeutic environment   - Provide required programming   - Redirect inappropriate behaviors   Outcome: Progressing     Problem: Alteration in Thoughts and Perception  Goal: Agree to be compliant with medication regime, as prescribed and report medication side effects  Description: Interventions:  - Offer appropriate PRN medication and supervise ingestion; conduct AIMS, as needed   Outcome: Not Progressing

## 2024-06-22 NOTE — NURSING NOTE
@08:49 RN administered Tylenol 650 mg PO for 2/10 menstrual cramp pain and applied x1 heat pack to area. Pain reduced to 1/10.

## 2024-06-22 NOTE — NURSING NOTE
"At approx 1700, pt left dining room to take some items back to room. Pt returned to dining room with complaints that there is nothing to do between meals. Asking for extra food. Writer declined any extra food, pt ate food on meal tray, next meal time is at 2100 for snack. Pt stated that this place is like a care home. Pt was forced to be here, does not belong here. Writer encouraged pt to come out of room throughout the day and watch TV. Pt stated, \"what am I? Retarded? You think I just sit in front of the TV all day?\" Writer redirected pt and encouraged other forms of distraction or coping skills. Pt walked out of dining room and began pacing halls.   "

## 2024-06-22 NOTE — NURSING NOTE
"Patient was seen in her room with an anxious affect. She admits to feeling anxious and says \"I don't understand why I'm in a mental hospital.\" Denies SI, HI, AVH. Patient requested to be left alone. Patient exhibits poor focus and concentration. Denies any unmet needs at this time.   "

## 2024-06-22 NOTE — NURSING NOTE
Patient is sitting in activity room quiet and isolative to herself. She denies SI/HI/AVH, denies anxiety and depression and denies any unmet needs. She remains very quiet and minimizes her reason for being here. She doesn't feel that she needs to be here, and is not very conversational. Patient has poor eye contact and minimal with her answers. Will continue to monitor.

## 2024-06-22 NOTE — TREATMENT TEAM
06/22/24 0800   Team Meeting   Meeting Type Daily Rounds   Team Members Present   Team Members Present Physician;Nurse   Physician Team Member Dr. Adrian / MELANY Rivera   Nursing Team Member Kingsley   Patient/Family Present   Patient Present No   Patient's Family Present No     Rounds:  72hr 6/20 @ 1600.  Withdrawn to self, nap at times, pleasant, poor insight into MH. Denies SI

## 2024-06-22 NOTE — PROGRESS NOTES
Progress Note - Behavioral Health   Cesar May 34 y.o. female MRN: [id number]  Unit/Bed#: [facility] 203-02 Encounter: [id number]    Assessment:  Principal Problem:    Bipolar disorder with psychotic features (HCC)  Active Problems:    Medical clearance for psychiatric admission    34-year-old female with bipolar disorder with psychotic features currently with mildly disorganized thought process but denying SI/HI/AVH.  Signed 72-hour notice on 6/20/2024.  For discharge tomorrow in the absence of ongoing 302 behaviors.    Plan:  --Continue Depakote 500 mg at bedtime and Zyprexa 10 mg at bedtime  --Continue with psychiatric hospitalization  --Continue with individual, group, and milieu therapy  --Continue the following medications:  Current Facility-Administered Medications   Medication Dose Route Frequency    acetaminophen (TYLENOL) tablet 650 mg  650 mg Oral Q6H PRN    acetaminophen (TYLENOL) tablet 650 mg  650 mg Oral Q4H PRN    acetaminophen (TYLENOL) tablet 975 mg  975 mg Oral Q6H PRN    aluminum-magnesium hydroxide-simethicone (MAALOX) oral suspension 30 mL  30 mL Oral Q4H PRN    artificial tear ophthalmic ointment   Both Eyes 4x Daily PRN    haloperidol lactate (HALDOL) injection 2.5 mg  2.5 mg Intramuscular Q6H PRN Max 4/day    And    LORazepam (ATIVAN) injection 1 mg  1 mg Intramuscular Q6H PRN Max 4/day    And    benztropine (COGENTIN) injection 0.5 mg  0.5 mg Intramuscular Q6H PRN Max 4/day    benztropine (COGENTIN) injection 1 mg  1 mg Intramuscular BID PRN    haloperidol lactate (HALDOL) injection 5 mg  5 mg Intramuscular Q4H PRN Max 4/day    And    LORazepam (ATIVAN) injection 2 mg  2 mg Intramuscular Q4H PRN Max 4/day    And    benztropine (COGENTIN) injection 1 mg  1 mg Intramuscular Q4H PRN Max 4/day    benztropine (COGENTIN) tablet 1 mg  1 mg Oral BID PRN    bisacodyl (DULCOLAX) rectal suppository 10 mg  10 mg Rectal Daily PRN    divalproex sodium (DEPAKOTE ER) 24 hr tablet 500 mg  500 mg Oral HS     haloperidol (HALDOL) tablet 2 mg  2 mg Oral Q4H PRN Max 6/day    haloperidol (HALDOL) tablet 5 mg  5 mg Oral Q6H PRN Max 4/day    haloperidol (HALDOL) tablet 5 mg  5 mg Oral Q4H PRN Max 4/day    hydrOXYzine HCL (ATARAX) tablet 25 mg  25 mg Oral Q6H PRN Max 4/day    hydrOXYzine HCL (ATARAX) tablet 50 mg  50 mg Oral Q4H PRN Max 4/day    Or    LORazepam (ATIVAN) injection 1 mg  1 mg Intramuscular Q4H PRN    LORazepam (ATIVAN) tablet 1 mg  1 mg Oral Q4H PRN Max 6/day    Or    LORazepam (ATIVAN) injection 2 mg  2 mg Intramuscular Q6H PRN Max 3/day    magnesium hydroxide (MILK OF MAGNESIA) oral suspension 30 mL  30 mL Oral Daily PRN    OLANZapine (ZyPREXA ZYDIS) dispersible tablet 10 mg  10 mg Oral HS    propranolol (INDERAL) tablet 10 mg  10 mg Oral Q8H PRN    senna-docusate sodium (SENOKOT S) 8.6-50 mg per tablet 1 tablet  1 tablet Oral Daily PRN    traZODone (DESYREL) tablet 50 mg  50 mg Oral HS PRN       Subjective: Patient was seen for continuation of care. Chart was reviewed and discussed with treatment team.     No acute behavioral events over the past 24 hours. Today, patient was seen and examined at bedside for continuation of care.  Patient was visibly anxious on interview.  Seem to have somewhat bizarre thought process but was generally organized and rational.  Reports that she needs to connect with people outside the hospital and can better help her.  Is not sure that medications do anything and reports that the only reason she took medications in the past were because her father wanted her to and believes she needed medications.  Does not feel Depakote has been helpful and is not interested in follow-up with outpatient psychiatry.  Signed a 72-hour notice on 6/20/2024 and is set for discharge tomorrow.  Denies current SI/HI/AVH.    Patient denied adverse effects to their psychiatric medication regimen. Patient denied other new or worsening psychiatric symptoms/complaints at this time. Discussed the importance  of continuing to take medications as prescribed, as well as the importance of continuing to attend groups on the unit.     Psychiatric Review of Systems:  Medication adverse effects: none  Sleep: unchanged  Appetite: unchanged  Behaviors over the past 24 hours: unchanged    Vitals:  Vitals:    06/22/24 0735   BP: 114/77   Pulse: 97   Resp: 18   Temp: 98 °F (36.7 °C)   SpO2: 100%       Laboratory results:    I have personally reviewed all pertinent laboratory/tests results  Recent Results (from the past 48 hour(s))   CBC and differential    Collection Time: 06/22/24  6:06 AM   Result Value Ref Range    WBC 3.74 (L) 4.31 - 10.16 Thousand/uL    RBC 4.47 3.81 - 5.12 Million/uL    Hemoglobin 7.3 (L) 11.5 - 15.4 g/dL    Hematocrit 29.0 (L) 34.8 - 46.1 %    MCV 65 (L) 82 - 98 fL    MCH 16.3 (L) 26.8 - 34.3 pg    MCHC 25.5 (L) 31.4 - 37.4 g/dL    RDW 23.4 (H) 11.6 - 15.1 %    Platelets 89 (L) 149 - 390 Thousands/uL    nRBC 1 /100 WBCs    Segmented % 47 43 - 75 %    Immature Grans % 0 0 - 2 %    Lymphocytes % 37 14 - 44 %    Monocytes % 12 4 - 12 %    Eosinophils Relative 1 0 - 6 %    Basophils Relative 3 (H) 0 - 1 %    Absolute Neutrophils 1.77 (L) 1.85 - 7.62 Thousands/µL    Absolute Immature Grans 0.01 0.00 - 0.20 Thousand/uL    Absolute Lymphocytes 1.38 0.60 - 4.47 Thousands/µL    Absolute Monocytes 0.43 0.17 - 1.22 Thousand/µL    Eosinophils Absolute 0.05 0.00 - 0.61 Thousand/µL    Basophils Absolute 0.10 0.00 - 0.10 Thousands/µL   Comprehensive metabolic panel    Collection Time: 06/22/24  6:06 AM   Result Value Ref Range    Sodium 136 135 - 147 mmol/L    Potassium 4.3 3.5 - 5.3 mmol/L    Chloride 104 96 - 108 mmol/L    CO2 25 21 - 32 mmol/L    ANION GAP 7 4 - 13 mmol/L    BUN 23 5 - 25 mg/dL    Creatinine 1.62 (H) 0.60 - 1.30 mg/dL    Glucose 90 65 - 140 mg/dL    Glucose, Fasting 90 65 - 99 mg/dL    Calcium 9.4 8.4 - 10.2 mg/dL    AST 18 13 - 39 U/L    ALT 10 7 - 52 U/L    Alkaline Phosphatase 45 34 - 104 U/L    Total  "Protein 7.0 6.4 - 8.4 g/dL    Albumin 4.1 3.5 - 5.0 g/dL    Total Bilirubin 0.32 0.20 - 1.00 mg/dL    eGFR 40 ml/min/1.73sq m   hCG, serum, qualitative    Collection Time: 06/22/24  6:06 AM   Result Value Ref Range    Preg, Serum Negative Negative   Lipid panel    Collection Time: 06/22/24  6:06 AM   Result Value Ref Range    Cholesterol 150 See Comment mg/dL    Triglycerides 43 See Comment mg/dL    HDL, Direct 69 >=50 mg/dL    LDL Calculated 72 0 - 100 mg/dL    Non-HDL-Chol (CHOL-HDL) 81 mg/dl   TSH, 3rd generation with Free T4 reflex    Collection Time: 06/22/24  6:06 AM   Result Value Ref Range    TSH 3RD GENERATON 0.573 0.450 - 4.500 uIU/mL        Current Medications:  Current medications as per above. All medications have been reviewed.   Risks, benefits, alternatives, and possible side effects of patient's psychiatric medications were discussed with patient.     Mental Status Evaluation:  Appearance: moderately kempt  Motor: no psychomotor agitation  Behavior: cooperative, pleasant  Speech: Mildly increased rate, normal rhythm and volume   mood: \"anxious\"  Affect: mood-congruent, anxious appearing  Thought Process: organized and linear  Thought Content: denies delusions  Risk Potential: denies suicidal ideation, plan, or intent. Denies homicidal ideation  Perceptions: denies auditory hallucinations, denies visual hallucinations, no IP/RIS  Sensorium: Oriented to person, place, time, and situation  Cognition: cognitive ability appears intact but was not quantitatively tested  Consciousness: alert and awake  Attention: currently intact  Insight: fair  Judgement: fair      Progress Toward Goals & Illness Status: Patient is not at goal. They are not yet ready for discharge. The patient's condition currently requires active psychopharmacological medication management, interdisciplinary coordination with case management, and the utilization of adjunctive milieu and group therapy to augment psychopharmacological " efficacy. The patient's risk of morbidity, and progression or decompensation of psychiatric disease, is higher without this current treatment.       This note has been constructed using a voice recognition system. There may be translation, syntax, or grammatical errors. If you have any questions, please contact the dictating provider.

## 2024-06-23 VITALS
SYSTOLIC BLOOD PRESSURE: 98 MMHG | DIASTOLIC BLOOD PRESSURE: 68 MMHG | OXYGEN SATURATION: 99 % | HEIGHT: 64 IN | WEIGHT: 157.2 LBS | TEMPERATURE: 97.6 F | HEART RATE: 73 BPM | RESPIRATION RATE: 18 BRPM | BODY MASS INDEX: 26.84 KG/M2

## 2024-06-23 PROCEDURE — 99239 HOSP IP/OBS DSCHRG MGMT >30: CPT | Performed by: STUDENT IN AN ORGANIZED HEALTH CARE EDUCATION/TRAINING PROGRAM

## 2024-06-23 RX ORDER — DIVALPROEX SODIUM 500 MG/1
500 TABLET, EXTENDED RELEASE ORAL
Qty: 30 TABLET | Refills: 0 | Status: SHIPPED | OUTPATIENT
Start: 2024-06-23 | End: 2024-07-23

## 2024-06-23 RX ORDER — OLANZAPINE 10 MG/1
10 TABLET ORAL
Qty: 30 TABLET | Refills: 0 | Status: SHIPPED | OUTPATIENT
Start: 2024-06-23 | End: 2024-07-23

## 2024-06-23 RX ADMIN — ACETAMINOPHEN 650 MG: 325 TABLET, FILM COATED ORAL at 06:36

## 2024-06-23 NOTE — DISCHARGE INSTR - OTHER ORDERS
Crisis Intervention services are available 24 hours a day by calling [phone]. The crisis unit is located at [address] [place] [address]. Services include telephone counseling, mobile crisis, walk-in crisis, and assistance in accessing inpatient care and short-term crisis residential services.    The PEER LINE is a toll-free phone number for people in [facility] who are seeking a listening ear for additional support in their recovery from mental illness. The PEER LINE is peer-run and peer-friendly. Callers to the PEER LINE will speak directly to other individuals who have experienced the mental health recovery process.  Hours of operation: 8:30 am - 4:30 pm, Monday through Friday   [phone] ([phone])   [facility]  [address]  [address]  [place] [address]    Text CONNECT to [phone] from anywhere in the [place], anytime, about any type of crisis.  A live, trained Crisis Counselor receives the text and lets you know that they are here to listen.  The volunteer Crisis Counselor will help you move from a hot moment to a cool moment.    Warm Line: [phone], [phone], [phone]  If it is not quite a crisis, but you want to talk to someone, 24 hours/day, 7 days/week:  Someone to listen; someone who cares.    The National [place] on Mental Illness (JEMAL) offers various education & support groups for you & your family.  For more information visit their website at   http://www.jemal-lv.org/.    Dial 2-1-1 to get connected/get help.  Free, confidential information & referral available 24/7: Aging Services, Child & Youth Services, Counseling, Education/Training, Food/Shelter/Clothing, Health Services, Parenting, Substance Abuse, Support Groups, Volunteer Opportunities, & much more.  Phone: 2-1-1 or [phone], Web: [email or web address], Email: [email or web address]@[email or web address].org    The Drop-In Centers are open to all mental health consumers in [facility] who are interested in  meeting people and making new friends. They provide a friendly social atmosphere with scheduled daily activities including games, arts & crafts, discussion and education groups, vocational activities, and much more. Light refreshments are served daily. The locations are:    [facility] Drop-In Center - operated by [facility]   [address] [address]   Phone: [phone]   Fax: [phone]   E-mail: [email or web address]  Hours of Operation:  Monday 3:00 PM - 8:00 PM  Tuesday 12:00 PM - 8:00 PM   Wednesday 3:00 PM - 8:00 PM  Thursday 12:00 PM - 8:00 PM   Friday 3:00 PM - 8:00 PM   Saturday [facility] Drop-In Center - operated by [facility] [address] [place] [address]  Phone: [phone]  Fax: [phone]  Hours of Operation  Monday 12:00 PM - 8:00 PM  Tuesday 12:00 PM - 8:00 PM  Wednesday 12:00 PM - 8:00 PM   Thursday 12:00 PM - 8:00 PM  Friday 12:00 PM - 9:00 PM   Saturday 11:00 AM - 4:00 PM  Van transportation is available on scheduled days for transportation.    Psych Rehab Program:  Modeled after the [facility] program in [facility], the [employer] movement offers consumers interested in fulfilling work a guaranteed place to come, to belong, and to enjoy meaningful relationships as they seek the confidence and skills necessary to lead vocationally productive and socially satisfying lives. Here is their contact information:  [facility] Psychiatric Rehabilitation Program  [address] [place] [address]  Phone: [phone]  [email or web address]  This site is open Monday thru Thursday from 8:30 am till 4:00 pm and Fridays from 8:30 am till 3:00 pm. Consumers are encouraged to stop by for a visit. After-hour social activities are also scheduled.    Support & Referral Helpline  Support and Referral Helpline is a 24-hour, 7 days-a-week, listening and referral service provided to [facility].  Please call  [phone] or Y [phone] to speak with one of our specialists.  The helpline is possible through partnerships with the [place] Department of Human Services and [place] for Community Resources.    The 988 Suicide & Crisis Lifeline (formerly known as the National Suicide Prevention Lifeline) provides free and confidential emotional support to people in suicidal crisis or emotional distress 24 hours a day, 7 days a week, across the [facility]. The Lifeline is comprised of a national network of over 200 local crisis centers, combining custom local care and resources with national standards and best practices.

## 2024-06-23 NOTE — DISCHARGE INSTR - APPOINTMENTS
Carolyn our Behavioral Health Nurse Navigator, will be calling you after your discharge, on the phone number that you provided.  She will be available as an additional support, if needed. If you wish to speak with Carolyn, you may contact her at [phone].    You are being discharged home to [address] [place] [address].  You provided the home number [phone] as the best say to contact you, as you no longer have a cell phone.

## 2024-06-23 NOTE — NURSING NOTE
"Patient pleasant, cooperative, guarded, denies SI HI AVH at present. Patient aware of discharge today, yet reports feeling like she did not get the \"Respite\" from her stay here.  Physical assessment WNL  "

## 2024-06-23 NOTE — PROGRESS NOTES
Treatment Plan Meeting:  Diagnosis of Bipolar disorder with psychotic features reviewed.  Dicussed short term goals for decrease in psychotic symptoms, and decrease in manic symptoms.   Pt signed a 72 hour notice on 6/20 @ 1600.  At this time, d/c is planned for Sunday.    All parties in agreement and Treatment Plan was signed.     06/21/24 1030   Team Meeting   Meeting Type Tx Team Meeting   Initial Conference Date 06/21/24   Next Conference Date 07/20/24   Team Members Present   Team Members Present Physician;Nurse;   Physician Team Member Everardo   Nursing Team Member Lexis   Care Management Team Member Sedrick   Patient/Family Present   Patient Present No  (Pt declined to meet with Treatment Team.)   Patient's Family Present No

## 2024-06-23 NOTE — BH TRANSITION RECORD
Contact Information: If you have any questions, concerns, pended studies, tests and/or procedures, or emergencies regarding your inpatient behavioral health visit. Please contact [place] behavioral health [facility] [phone] and ask to speak to a , nurse or physician. A contact is available 24 hours/ 7 days a week at this number.     Summary of Procedures Performed During your Stay:  Below is a list of major procedures performed during your hospital stay and a summary of results:  - No major procedures performed.    Pending Studies (From admission, onward)       Start     Ordered    06/22/24 2000  Valproic acid level, total  Morning draw         06/21/24 1765                  Please follow up on the above pending studies with your PCP and/or referring provider.

## 2024-06-23 NOTE — DISCHARGE SUMMARY
"Discharge Summary - Behavioral Health   Cesar May 34 y.o. female MRN: [id number]  Unit/Bed#: -02 Encounter: [id number]     Admission Date: 6/20/2024         Discharge Date: 06/23/24    Attending Psychiatrist: Alfonzo Mcgee DO    Reason for Admission/HPI (as per admission documentation):     Per ED provider note:  35 y/o female with hx of anxiety, depression, and prior episodes of psychosis presents to the ED via EMS from home for evaluation of suicidal ideation.  She states that she does not have a plan to hurt herself but\"I'm just waiting for God to do something.\"  She also states that she lives with her parents and believes that they want to kill her.  Per EMS report, she was also displaying paranoia and refused to have any vital signs taken.  She denies any physical symptoms or complaints.  No homicidal ideation or hallucinations.  The patient has Depakote listed in her prior medication list, however when I asked the patient she states that she does not take any medication and does not need any medications.     Per Crisis worker note:  Met with patient to complete the crisis intake assessment and the safety risk assessment. Patient arrived via EMS after calling the police. Patient reported that her family wants to kill her and she endorsed SI without a plan. Patient was initially calm and cooperative. She spoke softly but was tangential and responses were delayed. Patient that she has, \"no peace, no wellness and there is spiritual evil. She reported that her parents are \"thrilled not to give a shit about me.\" Patient kept asking what happened to the world and why is the world evil. She reported that she does not have a job because she doesn't fit in anywhere. She reported that her parent's are evil, sociopathic with no humanity and no love. Patient endorsed SI but did not report a plan. Patient started crying in the middle of the assessment. She reported being \"so unhappy.\" Patient stated that " "she was hearing and feeling something but could not identify what is was. Patient reported that she has an Economics degree from [employer]. She reported that she wanted to go back to [place]. Patient did not want to sign a 201, stating that she didn't need help. Patient has no insight of her condition. She denied needing to take medication and denied having a Psychiatrist. Patient was advised that the doctors were signing a 302 and she would need to go to inpatient U. Patient became agitated and reported that this was evil and how could we do this. She asked to talk to someone else and was advised that it would her involuntary admission would not change. Patient kept saying \"Are you going to keep sending to the hospital every 6 months when it doesn't work?\" Rights were explained to the patient. She appeared to understand. Patient called this writer and the doctors evil and said to this writer, \"F**k you bitch, get out of here.\"      On admission to Inpatient Psychiatric Unit:  Patient is a 34-year-old black female with a history of an unspecified mood disorder with prior episodes concerning for psychosis and tika, who presented initially on a 2 doc 302, later converted to a 201, with reported suicidal ideation and tika.     Symptoms prior to hospitalization include: Reported suicidal ideation that the patient now denies, Jain preoccupation, paranoia, delusions that witchcraft has been done and that her house has an evil spirit, and decreased sleep.  Symptom severity is rated as severe.  Timeline is unknown.  Mitigating factors are family support.  Exacerbating stressors are medication and treatment nonadherence.     On initial psychiatric evaluation today, the patient is agitated and paranoid.  She intermittently yells at me.  She states that she should not be here at the hospital.  She is manic appearing and appears to have a disorganized thought process and may be psychotic at this time.  She states that she " "was sent to the hospital because she herself called 911 and tried to report evil acts taking place in her home, which she said stems from her father's participation in some type of witchcraft that he started in [place].  She denies suicidal ideation, intent, or plan.  She denies hallucinations.  She does endorse symptoms consistent with previous manic episodes.  She admits to medication nonadherence and has no psychiatric outpatient services.  She states \"you think I'm crazy but it's like the exorcist, how would medications help with that?\" She immediately signed a 72 hour notice to withdraw from treatment. While she is manic and potentially psychotic on exam, she denies all symptoms and is future oriented, inquiring about a woman's shelter. She denies HI.       Past Medical History:   Diagnosis Date    Anxiety     Depression     Fibroids 07/25/2022    Iron deficiency anemia due to chronic blood loss 07/25/2022    Sleep difficulties      No past surgical history on file.    Medications:    Current Facility-Administered Medications   Medication Dose Route Frequency Provider Last Rate    acetaminophen  650 mg Oral Q6H PRN Alf Aldridge PA-C      acetaminophen  650 mg Oral Q4H PRN Alf Aldridge PA-C      acetaminophen  975 mg Oral Q6H PRN Alf Aldridge PA-C      aluminum-magnesium hydroxide-simethicone  30 mL Oral Q4H PRN Alf Aldridge PA-C      artificial tear   Both Eyes 4x Daily PRN Alf Aldridge PA-C      haloperidol lactate  2.5 mg Intramuscular Q6H PRN Max 4/day Alf Aldridge PA-C      And    LORazepam  1 mg Intramuscular Q6H PRN Max 4/day Alf Aldridge PA-C      And    benztropine  0.5 mg Intramuscular Q6H PRN Max 4/day Alf Aldridge PA-C      benztropine  1 mg Intramuscular BID PRN Alf Aldridge PA-C      haloperidol lactate  5 mg Intramuscular Q4H PRN Max 4/day Alf Aldridge PA-C      And    LORazepam  2 mg Intramuscular Q4H PRN Max 4/day Alf Aldridge PA-C      And    " benztropine  1 mg Intramuscular Q4H PRN Max 4/day [facility], PA-C      benztropine  1 mg Oral BID PRN [facility], PA-C      bisacodyl  10 mg Rectal Daily PRN [facility], PA-C      divalproex sodium  500 mg Oral HS [facility], PA-C      haloperidol  2 mg Oral Q4H PRN Max 6/day [facility], PA-C      haloperidol  5 mg Oral Q6H PRN Max 4/day [facility], PA-C      haloperidol  5 mg Oral Q4H PRN Max 4/day [facility], PA-C      hydrOXYzine HCL  25 mg Oral Q6H PRN Max 4/day [facility], PA-C      hydrOXYzine HCL  50 mg Oral Q4H PRN Max 4/day [facility], PA-C      Or    LORazepam  1 mg Intramuscular Q4H PRN [facility], PA-C      LORazepam  1 mg Oral Q4H PRN Max 6/day [facility], PA-C      Or    LORazepam  2 mg Intramuscular Q6H PRN Max 3/day [facility], PA-C      magnesium hydroxide  30 mL Oral Daily PRN [facility], PA-C      OLANZapine  10 mg Oral HS [facility], PA-C      propranolol  10 mg Oral Q8H PRN [facility], PA-C      senna-docusate sodium  1 tablet Oral Daily PRN [facility], PA-C      traZODone  50 mg Oral HS PRN [facility], PA-C         Allergies:     Allergies   Allergen Reactions    Penicillins Other (See Comments)     Reaction Date: 21Jul2008;   Reaction Date: 21Jul2008;       Augmentin [Amoxicillin-Pot Clavulanate] Rash       Vital signs in last 24 hours:    Temp:  [97.6 °F (36.4 °C)-98.4 °F (36.9 °C)] 97.6 °F (36.4 °C)  HR:  [73-81] 73  Resp:  [18] 18  BP: ()/(68-80) 98/68    No intake or output data in the 24 hours ending 06/23/24 0912    Hospital Course:     Cesar was admitted to the inpatient psychiatric unit on 6/20/2024. During their hospitalization, Cesar was encouraged to attend groups and interact appropriately and positively with others in the milieu.     Cesar was started on the following psychiatric medications: Zyprexa, Depakote. These medications were titrated up to a therapeutic range as evidenced by a  "reduction in Cesar's reported psychiatric symptomatology. There were no side effects noted or reported throughout Cesar's psychiatric hospitalization.  Patient signed 72-hour notice and was discharged with a 3-day window.  Continues to demonstrate some bizarre affect and behavior, paranoia and suspiciousness but no behaviors warranting 302.  Patient was discharged with referrals for outpatient follow-up.    At the time of discharge, there were no criteria present through which Cesar could be kept involuntarily for further psychiatric hospitalization. Patient was able to articulate a safety plan upon discharge home, including going to any ED if their symptoms return or worsen, or calling 911. We discussed mitigating factors against suicidal behavior, and the patient was able to articulate these mitigating factors which outweighed any potential exacerbating stressors. Patient explicitly denied suicidal ideation, plan, or intent. They explicitly stated they felt safe to return to the community.     An outpatient discharge/follow up plan was discussed and coordinated between Cesar, this writer, and case management team.    Specific discharge disposition: Home.    Mental Status at Time of Discharge:     Appearance: casually dressed, appears consistent with stated age  Motor: no psychomotor disturbances, no gait abnormalities  Behavior: Somewhat bizarre, able to have conversation in a linear manner  Speech: normal rate, rhythm, and volume  Mood: \"good\"  Affect: Euthymic, constricted thought Process: organized, linear, and goal-oriented  Thought Content: denies auditory or visual hallucinations  Perception: denies delusions or other perceptual disturbances  Risk Potential: denies suicidal ideation, plan, or intent. Denies homicidal ideation  Sensorium: Oriented to person, place, time, and situation  Cognition: cognitive ability appears intact but was not quantitatively tested  Consciousness: alert and awake  Attention: " able to focus without difficulty  Insight: improved  Judgement: improved       Suicide/Homicide Risk Assessment:    Risk of Harm to Self:   Patient denied suicidal thoughts, intent, plan, or acts of furtherance at the time of discharge.    Risk of Harm to Others:  Patient denied homicidal thoughts or intent at the time of discharge      Admission Diagnosis:    Principal Problem:    Bipolar disorder with psychotic features (HCC)  Active Problems:    Medical clearance for psychiatric admission      Discharge Diagnosis:     Principal Problem:    Bipolar disorder with psychotic features (HCC)  Active Problems:    Medical clearance for psychiatric admission  Resolved Problems:    * No resolved hospital problems. *      Laboratory Results: I have personally reviewed all pertinent lab results.    Recent Results (from the past 48 hour(s))   CBC and differential    Collection Time: 06/22/24  6:06 AM   Result Value Ref Range    WBC 3.74 (L) 4.31 - 10.16 Thousand/uL    RBC 4.47 3.81 - 5.12 Million/uL    Hemoglobin 7.3 (L) 11.5 - 15.4 g/dL    Hematocrit 29.0 (L) 34.8 - 46.1 %    MCV 65 (L) 82 - 98 fL    MCH 16.3 (L) 26.8 - 34.3 pg    MCHC 25.5 (L) 31.4 - 37.4 g/dL    RDW 23.4 (H) 11.6 - 15.1 %    Platelets 89 (L) 149 - 390 Thousands/uL    nRBC 1 /100 WBCs    Segmented % 47 43 - 75 %    Immature Grans % 0 0 - 2 %    Lymphocytes % 37 14 - 44 %    Monocytes % 12 4 - 12 %    Eosinophils Relative 1 0 - 6 %    Basophils Relative 3 (H) 0 - 1 %    Absolute Neutrophils 1.77 (L) 1.85 - 7.62 Thousands/µL    Absolute Immature Grans 0.01 0.00 - 0.20 Thousand/uL    Absolute Lymphocytes 1.38 0.60 - 4.47 Thousands/µL    Absolute Monocytes 0.43 0.17 - 1.22 Thousand/µL    Eosinophils Absolute 0.05 0.00 - 0.61 Thousand/µL    Basophils Absolute 0.10 0.00 - 0.10 Thousands/µL   Comprehensive metabolic panel    Collection Time: 06/22/24  6:06 AM   Result Value Ref Range    Sodium 136 135 - 147 mmol/L    Potassium 4.3 3.5 - 5.3 mmol/L    Chloride 104  96 - 108 mmol/L    CO2 25 21 - 32 mmol/L    ANION GAP 7 4 - 13 mmol/L    BUN 23 5 - 25 mg/dL    Creatinine 1.62 (H) 0.60 - 1.30 mg/dL    Glucose 90 65 - 140 mg/dL    Glucose, Fasting 90 65 - 99 mg/dL    Calcium 9.4 8.4 - 10.2 mg/dL    AST 18 13 - 39 U/L    ALT 10 7 - 52 U/L    Alkaline Phosphatase 45 34 - 104 U/L    Total Protein 7.0 6.4 - 8.4 g/dL    Albumin 4.1 3.5 - 5.0 g/dL    Total Bilirubin 0.32 0.20 - 1.00 mg/dL    eGFR 40 ml/min/1.73sq m   hCG, serum, qualitative    Collection Time: 06/22/24  6:06 AM   Result Value Ref Range    Preg, Serum Negative Negative   Hemoglobin A1C    Collection Time: 06/22/24  6:06 AM   Result Value Ref Range    Hemoglobin A1C 5.1 Normal 4.0-5.6%; PreDiabetic 5.7-6.4%; Diabetic >=6.5%; Glycemic control for adults with diabetes <7.0% %     mg/dl   Lipid panel    Collection Time: 06/22/24  6:06 AM   Result Value Ref Range    Cholesterol 150 See Comment mg/dL    Triglycerides 43 See Comment mg/dL    HDL, Direct 69 >=50 mg/dL    LDL Calculated 72 0 - 100 mg/dL    Non-HDL-Chol (CHOL-HDL) 81 mg/dl   RPR-Syphilis Screening (Total Syphilis IGG/IGM)    Collection Time: 06/22/24  6:06 AM   Result Value Ref Range    Syphilis Total Antibody Non-reactive Non-Reactive   TSH, 3rd generation with Free T4 reflex    Collection Time: 06/22/24  6:06 AM   Result Value Ref Range    TSH 3RD GENERATON 0.573 0.450 - 4.500 uIU/mL   Vitamin D 25 hydroxy    Collection Time: 06/22/24  6:06 AM   Result Value Ref Range    Vit D, 25-Hydroxy 18.8 (L) 30.0 - 100.0 ng/mL        Discharge Medications:    See after visit summary for all reconciled discharge medications provided to patient and family.      Current Discharge Medication List        START taking these medications    Details   OLANZapine (ZyPREXA) 10 mg tablet Take 1 tablet (10 mg total) by mouth daily at bedtime  Qty: 30 tablet, Refills: 0    Associated Diagnoses: Bipolar disorder with psychotic features (HCC)              Current Discharge  Medication List           Current Discharge Medication List        CONTINUE these medications which have CHANGED    Details   divalproex sodium (DEPAKOTE ER) 500 mg 24 hr tablet Take 1 tablet (500 mg total) by mouth daily at bedtime  Qty: 30 tablet, Refills: 0    Associated Diagnoses: Bipolar disorder with psychotic features (HCC)              Current Discharge Medication List           Discharge instructions/Information to patient and family:     See after visit summary for information provided to patient and family.      Provisions for Follow-Up Care:    See after visit summary for information related to follow-up care and any pertinent home health orders.      Discharge Statement:    I spent 45 minutes discharging the patient. This time was spent on the day of discharge. I had direct contact with the patient on the day of discharge.     Additional documentation is required if more than 30 minutes were spent on discharge:    I had face-to-face contact with the patient and discussed discharge treatment plan  I reviewed the importance of compliance with medications and outpatient treatment after discharge with the patient.  I discussed discharge and treatment plan with the patient, nursing, and case management/social work.  I discussed the medication regimen and possible side effects of the medications with the patient prior to discharge. At the time of discharge they were tolerating psychiatric medications.  I discussed outpatient follow up with the patient as per treatment plan / aftercare summary.  I reviewed elements of the aftercare plan with the patient. I discussed that if symptoms worsen or reoccur, that the patient can return to the emergency room or dial 911 in an emergency.  I reviewed pertinent mitigating vs exacerbating stressors, as well as other risk factors, as they relate to potential suicidal behavior.  I reviewed the patient's hospital course and current psychiatric disease status. As of today,  they are now at goal. They are psychiatrically stable for discharge home. The combination of active psychopharmacological medication management, interdisciplinary coordination with case management, and adjunctive milieu and group therapy to augment psychopharmacological efficacy appears to have been successful. The patient's risk of morbidity, and progression or decompensation of psychiatric disease, is lower today as compared to the day of admission.      Zafar Adrian MD 06/23/24

## 2024-06-23 NOTE — TREATMENT TEAM
06/23/24 0800   Team Meeting   Meeting Type Daily Rounds   Team Members Present   Team Members Present Physician;Nurse   Physician Team Member Kaylah; [facility]   Nursing Team Member Dennis   Patient/Family Present   Patient Present No   Patient's Family Present No     Daily Rounds: pt refused depakote level this morning, states we already kaylin 700 tubes of blood.      D/c today at 1030 via [employer] r/t 72 hr notice signed 6/20/24 at 1600.

## 2024-06-24 NOTE — PROGRESS NOTES
AUDIT: 2, PAWSS: 0, BAT: 0, UDS: negative    Pt denied any drug, alcohol, or tobacco use.    She refused any referrals for therapy and/or medication management.

## 2025-03-17 ENCOUNTER — HOSPITAL ENCOUNTER (EMERGENCY)
Facility: HOSPITAL | Age: 35
Discharge: HOME | End: 2025-03-18
Attending: EMERGENCY MEDICINE | Admitting: EMERGENCY MEDICINE
Payer: MEDICARE

## 2025-03-17 DIAGNOSIS — F22 PARANOID DELUSION (HCC): Primary | ICD-10-CM

## 2025-03-17 LAB — ETHANOL EXG-MCNC: 0 MG/DL

## 2025-03-17 PROCEDURE — 99284 EMERGENCY DEPT VISIT MOD MDM: CPT | Performed by: EMERGENCY MEDICINE

## 2025-03-17 PROCEDURE — 99285 EMERGENCY DEPT VISIT HI MDM: CPT

## 2025-03-17 PROCEDURE — 81025 URINE PREGNANCY TEST: CPT | Performed by: EMERGENCY MEDICINE

## 2025-03-17 PROCEDURE — 82075 ASSAY OF BREATH ETHANOL: CPT | Performed by: EMERGENCY MEDICINE

## 2025-03-18 VITALS
OXYGEN SATURATION: 98 % | RESPIRATION RATE: 18 BRPM | SYSTOLIC BLOOD PRESSURE: 118 MMHG | HEART RATE: 104 BPM | TEMPERATURE: 98.1 F | DIASTOLIC BLOOD PRESSURE: 83 MMHG

## 2025-03-18 LAB
AMPHETAMINES SERPL QL SCN: NEGATIVE
BARBITURATES UR QL: NEGATIVE
BENZODIAZ UR QL: NEGATIVE
COCAINE UR QL: NEGATIVE
EXT PREGNANCY TEST URINE: NEGATIVE
EXT. CONTROL: NORMAL
FENTANYL UR QL SCN: NEGATIVE
HYDROCODONE UR QL SCN: NEGATIVE
METHADONE UR QL: NEGATIVE
OPIATES UR QL SCN: NEGATIVE
OXYCODONE+OXYMORPHONE UR QL SCN: NEGATIVE
PCP UR QL: NEGATIVE
THC UR QL: NEGATIVE

## 2025-03-18 PROCEDURE — 80307 DRUG TEST PRSMV CHEM ANLYZR: CPT | Performed by: EMERGENCY MEDICINE

## 2025-03-18 RX ORDER — OLANZAPINE 5 MG/1
10 TABLET, ORALLY DISINTEGRATING ORAL ONCE
Status: COMPLETED | OUTPATIENT
Start: 2025-03-18 | End: 2025-03-18

## 2025-03-18 RX ORDER — DIPHENHYDRAMINE HCL 25 MG
50 TABLET ORAL ONCE
Status: COMPLETED | OUTPATIENT
Start: 2025-03-18 | End: 2025-03-18

## 2025-03-18 RX ADMIN — DIPHENHYDRAMINE HYDROCHLORIDE 50 MG: 25 TABLET ORAL at 01:37

## 2025-03-18 RX ADMIN — Medication 3 MG: at 01:37

## 2025-03-18 RX ADMIN — OLANZAPINE 10 MG: 5 TABLET, ORALLY DISINTEGRATING ORAL at 00:05

## 2025-03-18 NOTE — ED NOTES
"Phone call with the patient's mother and later with with father on speaker phone.  Parents describe a lengthy mental health history to include multiple inpatient behavioral health hospitalizations in [place] and within [facility].  They report the patient refuses to speak with a therapist or a psychiatrist nor has taken medications in months. Parents believe that the patient's behaviors are \"spiritual\".  Parents are refusing to file a 302.  Mother states, \"it is just the best for me to come and get her. If not you will have to \"inject\" her with medications and I do not want that\".  ETA of 30 minutes. Updated nursing, EDMD of the same.  The ER treatment team does not have criteria for 302 therefore patient will be discharged with outpatient resources.  See separate note for safety plan.   "

## 2025-03-18 NOTE — DISCHARGE INSTRUCTIONS
Patient being discharged at this time with referrals and/or information about hotline / warm line numbers; walk-in clinic information; local outpatient resource list for therapists / counselors, psychologists, psychiatrists, and partial programs; and online / internet resources and support groups.  Advised to utilize natural and existing supports. Advised for safety planning and effective coping skills.  In addition, the patient was instructed to call local [facility] crisis, other crisis services, 911 or to go to the nearest ER immediately if their situation changes at any time.      [facility] Crisis Number: [place] [phone].     National Suicide/Crisis Lifeline: Dial/text or chat 876 ([employer] [facility]), and you be be connected to a trained counseler.      [facility] PEER/WARM Line 24 Hours Every Day: [phone]      SAFETY PLAN:  Warning Signs (thoughts, images, mood, behavior, situations) of a potential crisis: Worsening mood, acute psychosis, command hallucinations.       Coping Skills (what can I do to take my mind off the problem, or to keep myself safe:  Music. Take a break/rest/go for a walk.  Adequate rest.  Deep breathing techniques.     Outside Support (who can I reach out to for support and help: [facility] MH/Intake and referral [phone]. [facility] [phone].

## 2025-03-18 NOTE — ED NOTES
This writer discussed the patient's current presentation and recommended discharge plan with Dr. De. They agree with the patient being discharged at this time with referrals and/or information about hotline / warm line numbers; walk-in clinic information; local outpatient resource list for therapists / counselors, psychologists, psychiatrists, and partial programs; and online / internet resources and support groups.  Advised to utilize natural and existing supports. Advised for safety planning and effective coping skills.  In addition, the patient was instructed to call [facility] crisis, other crisis services, 911 or to go to the nearest ER immediately if their situation changes at any time.     [facility] Crisis Number: [place] [phone].    National Suicide/Crisis Lifeline: Dial/text or chat 742 ([employer] [facility]), and you be be connected to a trained counseler.     [facility] PEER/WARM Line 24 Hours Every Day: [phone]     SAFETY PLAN:  Warning Signs (thoughts, images, mood, behavior, situations) of a potential crisis: Worsening mood, acute psychosis, command hallucinations.       Coping Skills (what can I do to take my mind off the problem, or to keep myself safe:  Music. Take a break/rest/go for a walk.  Adequate rest.  Deep breathing techniques.     Outside Support (who can I reach out to for support and help: [facility]/Intake and referral [phone]. [facility] [phone].

## 2025-03-18 NOTE — ED NOTES
"Patient is a 35-year-old female who was brought in by her father for paranoia and delusions in addition to psychiatric medication non adherence.  Father reportedly told ER staff that he was going home to sleep.   Upon arrival, patient was administered p.o. zyprexa, benadryl and melatonin.  Per nursing, patient has been reporting being held a prisoner at her patient's home.  However, patient was asking for the phone to call her parents.  As per chart patient with history of bipolar disorder with psychosis, and multiple IP  hospitalizations.     Met with patient to complete crisis intake and safety assessments.  Patient under virtual monitoring.  No family in the department. Patient was eating off food tray.  Intense eye contact.  Pressured speech.  Patient was irritable and tangential.  Upon crisis introducing self and role patient stated, \"I remember you. You are useless!\"  Patient reported that she is being held hostage at her parents home in [place], that she is a prisoner with no phone or money.  Patient accused her parents of being evil and involved in witchcraft.  Patient stated that no one was helping her, that all the police do is bring her to the hospital.  Patient denied any current suicidal ideations, homicidal ideations or audiovisual hallucinations.  Patient did report having difficulty with sleep.  Patient denied any appetite disturbances, and was observed eating food throughout assessment.  Patient was religiously preoccupied. Thinking does not appear to be reality based.  Patient denied any illicit drug or alcohol use, (-) UDS and BAT 0.0.  Patient voiced frustration with her living situation and lack of finances.  Patient stated that she does not qualify for food stamps because she is a college graduate.  Patient stated that she graduated from [employer] in 2010 with a degree in economics. As per chart this appears to be accurate. Upon being asked if she is taking any medications the patient " "replied, \"for what? I have nothing wrong with me\".  Patient does not want to sign herself into the hospital for inpatient psychiatric care.  Patient was perseverative on her situation though she declined any housing/shelter/domestic violence resources. Patient requesting a phone to call her parents to be discharged to their residence. Patient has poor insight into her condition.  Patient was alert and oriented to being in the hospital, irritable though in behavioral control.  At this time there is no 302.       Per chart, patient with multiple prior [facility] hospitalizations for mood disorder and psychosis. Last hospitalization was at [facility] in June 2024.  At that time the patient was discharged after 3 days and referred to [place] for outpatient follow up.     Left voicemail with father Alf May [phone], requesting call back.   "

## 2025-03-18 NOTE — ED PROVIDER NOTES
Time reflects when diagnosis was documented in both MDM as applicable and the Disposition within this note       Time User Action Codes Description Comment    3/17/2025 11:50 PM Deniz Lance Add [F22] Paranoid delusion (HCC)           ED Disposition       ED Disposition   Transfer to Behavioral Health Condition   --    Date/Time   Mon Mar 17, 2025 11:50 PM    Comment   Cesar May should be transferred out to behavioral health, and has been medically cleared.               Assessment & Plan       Medical Decision Making  Patient is a 35-year-old female seen in the emergency department with concern for paranoid delusions.  Patient was placed on a safety watch in the emergency department.  Alcohol breath test was obtained and noted at 0.00.  Urine pregnancy test was negative.  Urine drug screen was negative. Patient was treated with medication for symptom control. Patient is medically cleared for evaluation by crisis team.  Patient is to be signed out to my colleague at change of shift, with plan for evaluation by crisis team.    Amount and/or Complexity of Data Reviewed  Labs: ordered. Decision-making details documented in ED Course.  ECG/medicine tests: ordered. Decision-making details documented in ED Course.    Risk  OTC drugs.  Prescription drug management.  Decision regarding hospitalization.             Medications   OLANZapine (ZyPREXA ZYDIS) dispersible tablet 10 mg (10 mg Oral Given 3/18/25 0005)   melatonin tablet 3 mg (3 mg Oral Given 3/18/25 0137)   diphenhydrAMINE (BENADRYL) tablet 50 mg (50 mg Oral Given 3/18/25 0137)       ED Risk Strat Scores                            SBIRT 22yo+      Flowsheet Row Most Recent Value   Initial Alcohol Screen: US AUDIT-C     1. How often do you have a drink containing alcohol? 0 Filed at: 03/17/2025 1307   2. How many drinks containing alcohol do you have on a typical day you are drinking?  0 Filed at: 03/17/2025 7000   3b. FEMALE Any Age, or MALE 65+: How  often do you have 4 or more drinks on one occassion? 0 Filed at: 03/17/2025 2359   Audit-C Score 0 Filed at: 03/17/2025 2359   WINNIE: How many times in the past year have you...    Used an illegal drug or used a prescription medication for non-medical reasons? Never Filed at: 03/17/2025 8789                            History of Present Illness       Chief Complaint   Patient presents with    Psychiatric Evaluation     Patient brought in with dad for psych eval.        Past Medical History:   Diagnosis Date    Anxiety     Depression     Fibroids 07/25/2022    Iron deficiency anemia due to chronic blood loss 07/25/2022    Sleep difficulties       History reviewed. No pertinent surgical history.   History reviewed. No pertinent family history.   Social History     Tobacco Use    Smoking status: Never     Passive exposure: Never    Smokeless tobacco: Never    Tobacco comments:     N/A, patient is a non-smoker.   Vaping Use    Vaping status: Never Used   Substance Use Topics    Alcohol use: Not Currently     Comment: Denies    Drug use: Never      E-Cigarette/Vaping    E-Cigarette Use Never User       E-Cigarette/Vaping Substances    Nicotine No     THC No     CBD No     Flavoring No     Other No     Unknown No       I have reviewed and agree with the history as documented.     Patient is a 35-year-old female seen in the emergency department brought by father with concern for behavior problem at home.  Family explains that the patient has not taken medications in approximately 6-9 months.  Patient stated that she has been concerned about her family using witchcraft.  Patient is also concerned about peers at her Christian possibly not being Orthodoxy.  Patient denies suicidal and homicidal ideation.  Patient has no other acute medical complaints in the emergency department.  Patient states that she would also like help with housing.        Review of Systems   Constitutional:  Negative for chills and fever.   HENT:  Negative  for ear pain and sore throat.    Eyes:  Negative for pain and visual disturbance.   Respiratory:  Negative for cough and shortness of breath.    Cardiovascular:  Negative for chest pain and palpitations.   Gastrointestinal:  Negative for abdominal pain and vomiting.   Genitourinary:  Negative for decreased urine volume and difficulty urinating.   Musculoskeletal:  Negative for gait problem and neck stiffness.   Skin:  Negative for color change and rash.   Neurological:  Negative for seizures and syncope.   Psychiatric/Behavioral:  Negative for agitation and suicidal ideas.         No homicidal or suicidal ideation; paranoid delusions   All other systems reviewed and are negative.          Objective       ED Triage Vitals [03/17/25 2344]   Temperature Pulse Blood Pressure Respirations SpO2 Patient Position - Orthostatic VS   98.2 °F (36.8 °C) 96 115/86 17 100 % Sitting      Temp Source Heart Rate Source BP Location FiO2 (%) Pain Score    Oral Monitor Left arm -- --      Vitals      Date and Time Temp Pulse SpO2 Resp BP Pain Score FACES Pain Rating User   03/18/25 0603 98.1 °F (36.7 °C) 104 98 % 18 118/83 -- -- MS   03/17/25 2344 98.2 °F (36.8 °C) 96 100 % 17 115/86 -- -- LC            Physical Exam  Vitals and nursing note reviewed.   Constitutional:       General: She is not in acute distress.     Appearance: She is well-developed.   HENT:      Head: Normocephalic and atraumatic.      Right Ear: External ear normal.      Left Ear: External ear normal.      Nose: Nose normal.      Mouth/Throat:      Pharynx: Oropharynx is clear.   Eyes:      General: No scleral icterus.     Conjunctiva/sclera: Conjunctivae normal.   Cardiovascular:      Rate and Rhythm: Normal rate.      Comments: well-perfused extremities  Pulmonary:      Effort: Pulmonary effort is normal. No respiratory distress.   Abdominal:      General: Abdomen is flat. There is no distension.   Musculoskeletal:         General: No deformity or signs of injury.       Cervical back: Normal range of motion and neck supple.   Skin:     General: Skin is dry.      Findings: No rash.   Neurological:      General: No focal deficit present.      Mental Status: She is alert.      Cranial Nerves: No cranial nerve deficit.      Motor: No weakness.   Psychiatric:         Mood and Affect: Mood normal.      Comments: Paranoid delusions, no homicidal or suicidal ideation         Results Reviewed       Procedure Component Value Units Date/Time    Rapid drug screen, urine [[id number]]  (Normal) Collected: 03/18/25 0006    Lab Status: Final result Specimen: Urine, Clean Catch Updated: 03/18/25 0023     Amph/Meth UR Negative     Barbiturate Ur Negative     Benzodiazepine Urine Negative     Cocaine Urine Negative     Methadone Urine Negative     Opiate Urine Negative     PCP Ur Negative     THC Urine Negative     Oxycodone Urine Negative     Fentanyl Urine Negative     HYDROCODONE URINE Negative    Narrative:      FOR MEDICAL PURPOSES ONLY.   IF CONFIRMATION NEEDED PLEASE CONTACT THE LAB WITHIN 5 DAYS.    Drug Screen Cutoff Levels:  AMPHETAMINE/METHAMPHETAMINES  1000 ng/mL  BARBITURATES     200 ng/mL  BENZODIAZEPINES     200 ng/mL  COCAINE      300 ng/mL  METHADONE      300 ng/mL  OPIATES      300 ng/mL  PHENCYCLIDINE     25 ng/mL  THC       50 ng/mL  OXYCODONE      100 ng/mL  FENTANYL      5 ng/mL  HYDROCODONE     300 ng/mL    POCT pregnancy, urine [[id number]]  (Normal) Collected: 03/18/25 0008    Lab Status: Final result Updated: 03/18/25 0008     EXT Preg Test, Ur Negative     Control Valid    POCT alcohol breath test [[id number]]  (Normal) Resulted: 03/17/25 2359    Lab Status: Final result Updated: 03/17/25 2359     EXTBreath Alcohol 0.00            No orders to display       Procedures    ED Medication and Procedure Management   Prior to Admission Medications   Prescriptions Last Dose Informant Patient Reported? Taking?   OLANZapine (ZyPREXA) 10 mg tablet   No No   Sig: Take 1 tablet (10  mg total) by mouth daily at bedtime   divalproex sodium (DEPAKOTE ER) 500 mg 24 hr tablet   No No   Sig: Take 1 tablet (500 mg total) by mouth daily at bedtime      Facility-Administered Medications: None     Patient's Medications   Discharge Prescriptions    No medications on file     No discharge procedures on file.  ED SEPSIS DOCUMENTATION   Time reflects when diagnosis was documented in both MDM as applicable and the Disposition within this note       Time User Action Codes Description Comment    3/17/2025 11:50 PM Deniz Lance [F22] Paranoid delusion (HCC)                  Deniz Lance MD  03/18/25 0017       Deniz Lance MD  03/18/25 0133       Deniz Lance MD  03/18/25 0316       Deniz Lance MD  03/18/25 0317       Deniz Lance MD  03/18/25 0702

## 2025-03-18 NOTE — ED NOTES
"Pt out of room in [place] requesting phone.  Given.  She attempted to call her parents but there was no answer.  Pt is verbalizing frustration with her living situation.  States she is \"basically a prisoner in her parents home\".  States she has no phone and no friends.  States she has been \"through this multiple times and no one is helping her.  The police just bring her to the hospital\"  Allowed to verbalize and express emotions with support given.  Pt pleasant and returned to room to eat breakfast.  Crisis on unit and aware.      Yenifer Noble RN  03/18/25 0807    "

## 2025-03-23 NOTE — NURSING NOTE
History      Chief Complaint   Patient presents with    Neck Pain     Pt c/o L sided neck pain x 2 days. States that he thinks he slept wrong and now having spasms. Pt took 1gram of tylenol and 600mg ibuprofen.       Review of patient's allergies indicates:  No Known Allergies     HPI   HPI    3/22/2025, 7:41 PM   History obtained from the patient      History of Present Illness: Enrike Donaldson III is a 52 y.o. male patient who presents to the Emergency Department for left neck pain for 2 days when he tries to rotate head/neck.   Denies fever, focal weakness, light sensitivity, or n/v.          PCP: Faustino Mirza MD       Past Medical History:  Past Medical History:   Diagnosis Date    Acute non-recurrent maxillary sinusitis 06/11/2021    Asymptomatic microscopic hematuria 08/08/2019    Bronchitis 06/11/2021    Chronic fatigue 12/11/2017    Gout     Hyperlipidemia     Hypertension     Left elbow tendonitis 09/27/2019    Long-term current use of testosterone replacement therapy 01/08/2018    Orthostasis 07/23/2018    GABRIELLE (obstructive sleep apnea)     Pre-diabetes 01/08/2018    Right flank pain 12/11/2017    Urinary frequency 08/02/2019         Past Surgical History:  Past Surgical History:   Procedure Laterality Date    KNEE SURGERY Right     NOSE SURGERY             Family History:  Family History   Problem Relation Name Age of Onset    Cancer Mother Sweetie     Diabetes Father Enrike     Heart disease Father Enrike     Hypertension Father Enrike     Hyperlipidemia Father Enrike     Glaucoma Father Enrike     Cataracts Father Enrike     Heart disease Brother Aric     Epilepsy Daughter      Pancreatic cancer Neg Hx      Thyroid cancer Neg Hx             Social History:  Social History     Tobacco Use    Smoking status: Never    Smokeless tobacco: Never   Substance and Sexual Activity    Alcohol use: No    Drug use: No    Sexual activity: Yes     Partners: Female       ROS     Review of Systems       Review of Systems  72hr Withdrawn From Tx Form signed @1681 "  Respiratory:  Negative for chest tightness and shortness of breath.    Musculoskeletal:  Positive for neck pain and neck stiffness.   Neurological:  Negative for weakness.       Physical Exam      Initial Vitals [03/22/25 1916]   BP Pulse Resp Temp SpO2   (!) 182/104 79 18 97.8 °F (36.6 °C) (!) 93 %      MAP       --         Physical Exam  Vital signs and nursing notes reviewed.  Constitutional: Patient is uncomfortable. Awake and alert. Well-developed and well-nourished.  Head: Atraumatic. Normocephalic.  Eyes: PERRL. EOM intact. Conjunctivae nl. No scleral icterus.  ENT: Mucous membranes are moist. Oropharynx is clear.  Neck: Supple.   No meningismus.  No midline c spine ttp.  +left perispinous ttp.  Limited rotation due to pain.  Strong and equal hand   Cardiovascular: Regular rate and rhythm. No murmurs, rubs, or gallops. Distal pulses are 2+ and symmetric.  Pulmonary/Chest: No respiratory distress. Clear to auscultation bilaterally. No wheezing, rales, or rhonchi.  Abdominal: Soft. Non-distended. No TTP. No rebound, guarding, or rigidity. Good bowel sounds.  Musculoskeletal: Moves all extremities. No edema.   Skin: Warm and dry.  Neurological: Awake and alert. No acute focal neurological deficits are appreciated. Strong and equal hand .  Sensation to fingers x 5.  Psychiatric: Normal affect. Good eye contact. Appropriate in content.      ED Course          Procedures  ED Vital Signs:  Vitals:    03/22/25 1916 03/22/25 1948 03/22/25 2005 03/22/25 2018   BP: (!) 182/104  (!) 140/84    Pulse: 79  77 71   Resp: 18 20 18 20   Temp: 97.8 °F (36.6 °C)      TempSrc: Oral      SpO2: (!) 93%  95%    Weight: (!) 139.9 kg (308 lb 6.8 oz)      Height: 5' 10" (1.778 m)       03/22/25 2030 03/22/25 2102   BP: 139/76 (!) 153/98   Pulse: 77 71   Resp: 18 18   Temp:     TempSrc:     SpO2: 95% 95%   Weight:     Height:                   Imaging Results:  Imaging Results    None            The Emergency Provider " reviewed the vital signs and test results, which are outlined above.    ED Discussion             Medication(s) given in the ER:  Medications   morphine injection 4 mg (4 mg Intramuscular Given 3/22/25 1948)   ondansetron injection 4 mg (4 mg Intramuscular Given 3/22/25 1948)   LORazepam injection 1 mg (1 mg Intramuscular Given 3/22/25 1947)            Follow-up Information       Faustino Mirza MD In 2 days.    Specialties: Internal Medicine, Family Medicine  Contact information:  [address] [address] [id number] [phone]                                 Discharge Medication List as of 3/22/2025  8:57 PM        START taking these medications    Details   cyclobenzaprine (FLEXERIL) 10 MG tablet Take 1 tablet (10 mg total) by mouth 3 (three) times daily as needed for Muscle spasms., Starting Sat 3/22/2025, Normal      HYDROcodone-acetaminophen (NORCO)  mg per tablet Take 1 tablet by mouth every 8 (eight) hours as needed for Pain., Starting Sat 3/22/2025, Normal                Medical Decision Making      He can now fully rotate his neck.      All findings were reviewed with the patient/family in detail.   All remaining questions and concerns were addressed at that time.  Patient/family has been counseled regarding the need for follow-up as well as the indication to return to the emergency room should new or worrisome developments occur.        MDM                 Clinical Impression:        ICD-10-CM ICD-9-CM   1. Torticollis  M43.6 723.5               Lorene Coreas, EDUAR  03/22/25 4574

## 2025-04-19 ENCOUNTER — HOSPITAL ENCOUNTER (EMERGENCY)
Facility: HOSPITAL | Age: 35
Discharge: HOME | End: 2025-04-19
Attending: EMERGENCY MEDICINE
Payer: MEDICARE

## 2025-04-19 VITALS
HEART RATE: 102 BPM | OXYGEN SATURATION: 100 % | DIASTOLIC BLOOD PRESSURE: 80 MMHG | TEMPERATURE: 98.5 F | RESPIRATION RATE: 20 BRPM | SYSTOLIC BLOOD PRESSURE: 112 MMHG

## 2025-04-19 DIAGNOSIS — R46.2 BIZARRE BEHAVIOR: ICD-10-CM

## 2025-04-19 DIAGNOSIS — Z00.8 ENCOUNTER FOR PSYCHOLOGICAL EVALUATION: ICD-10-CM

## 2025-04-19 DIAGNOSIS — Z91.148 NONCOMPLIANCE WITH MEDICATION REGIMEN: Primary | ICD-10-CM

## 2025-04-19 PROCEDURE — 80307 DRUG TEST PRSMV CHEM ANLYZR: CPT | Performed by: EMERGENCY MEDICINE

## 2025-04-19 PROCEDURE — 99285 EMERGENCY DEPT VISIT HI MDM: CPT

## 2025-04-19 PROCEDURE — 82075 ASSAY OF BREATH ETHANOL: CPT | Performed by: EMERGENCY MEDICINE

## 2025-04-19 PROCEDURE — 81025 URINE PREGNANCY TEST: CPT | Performed by: EMERGENCY MEDICINE

## 2025-04-19 PROCEDURE — 99284 EMERGENCY DEPT VISIT MOD MDM: CPT | Performed by: EMERGENCY MEDICINE

## 2025-04-20 NOTE — ED NOTES
"Patient is a 35 year old female with a history of paranoia and Bipolar Disorder with psychotic features who presented to the ED via EMS after an encounter with a jogger and police suggested patient come in for an evaluation as her daughter called police as she was upset about the confrontation with jogger.    Crisis Intake Assessment completed virtually by this writer, located at [facility].    Patient is currently located at [facility].    Patient provided verbal consent to virtually participate in this crisis intake assessment.    Dr. Cruz also provided agreement for virtual assessment to take place at this time.        CIS spoke with patient over the phone. Pt has pressured speech and is tangential. Patient is hyperfixated on getting \"delivered from Evil.\" Pt rambling about Baptist and evil, spiritualism, worshipping. Pt reports she went to [facility] in 2022 (her father is from [facility]) and states she has felt negative since then. She reports that when she went to [facility] she did not feel connected with her family/relatives there and talked about devil worshipping. Pt reports she has a stress reaction to what she witnessed in [place]. Pt was difficult to follow but talked frequently about her father and relatives in [facility] and the houses they built and how her father sends money to her relatives their.  Pt reports that she has been under lots of stress and that she has felt isolated and reached out to people but \"hasn't heard back.\" Pt states that today, she saw a gentleman walking around/jogging the neighborhood who she was not familiar with, so she reports she went up to this man and asked him who he is, what he does, where he lives etc., she states she was just trying to get to know him, but states he said that it was not her business and so she said \"fck you\" and then the police were called and they thought pt should seek a MH evaluation. Patient states that things get lost in translation with her parents as they are from " "different countries. She talks about what she \"owes people\" and helping others, being a good person. Pt reports negativity has been attaching itself to her and she doesn't want to bring that on to herself or anyone else. Pt states she wants to serve others and that Yazidi is very helpful for her.  Pt talks about losing connections to others and not wanting to forget anything that has an impact on who she is.     Patient denies current SI and denies any self harm or previous self harm. She denies any past \"self-hatred\" but hates \"evil\". Pt denies HI. Pt states she is unemployed and tried applying for jobs in [place]. She reports wanting to \"do good in her life\" Pt states she hasn't worked in 5 years. Pt denies taking any medications and no current OP therapy. Pt states she eats consistently, her mom makes dinner and she states sleeping fluctuates.  Pt states she has been accusatory towards her parents lately regarding actions that her family participated in while in [facility]. Chart reflects a history of IP admissions, most recently at [facility] in 2023 and [facility] in 2022. Pt has a history of paranoia and psychosis. It appears that in the past, pt was prescribed Depakote and Zyprexa.    Treatment options were discussed with patient, including IP.  She states she has been inpatient before and does not feel like it's good for her. She would be open to talking to an OP therapist. Pt states she feels safe going home. While inpatient was recommended, there are no grounds for 302 at this time and patient does not appear to be at imminent risk of danger to herself or others.      "

## 2025-04-20 NOTE — ED NOTES
Patient speaking to [facility] crisis worker.Parents brought to family waiting room.      Sania Rice V RN  04/19/25 6757

## 2025-04-20 NOTE — ED NOTES
Discharge and Safety Plan    This writer discussed the patients current presentation and recommended discharge plan with Dr. Cruz.  They agree with the patient being discharged at this time with referrals and/or information about local  OP resources, crisis numbers/hotlines.     The patient was Instructed to follow up with their PCP  The patient was provided with referral information for: MH resources, crisis numbers.hotlines.    This writer and the patient completed a safety plan.  The patient was provided with a copy of their safety plan with encouragement to utilize the plan following discharge.     In addition, the patient was instructed to call local [facility] crisis, other crisis services, [facility] or to go to the nearest ER immediately if their situation changes at any time.     This writer discussed discharge plans with the patient, who agrees with and understands the discharge plans.         SAFETY PLAN  Warning Signs (thoughts, images, mood, behavior, situations) of a potential crisis:  negative thoughts, isolation, not taking medications, paranoia, feelings of evil.      Coping Skills (what can I do to take my mind off the problem, or to keep myself safe): take deep breaths, talk to loved ones, Sabianism/pray, journal, take a cool shower, watch something comforting, create something.    Outside Support (who can I reach out to for support and help): family, Zoroastrianism peers.      National Suicide Prevention Hotline:  988      [facility] [phone] - [facility] [phone] - LVF Crisis/Mobile Crisis   [phone] - SLPF Crisis   [facility]: [phone]  [facility]: [phone]   [facility] [phone] - Crisis   [facility] [phone] - Crisis     [facility] [phone] - Crisis   [facility] [phone] - Crisis   [phone] - Peer Support Talk Line (1-9pm daily)  [phone] - Teen Support Talk Line (1-9pm daily)  [phone] - [facility] [phone]-  Crisis    [facility] [phone] - Crisis   [facility] [phone] - Crisis    [facility] [phone] - [facility] Crisis

## 2025-04-20 NOTE — DISCHARGE INSTRUCTIONS
SAFETY PLAN  Warning Signs (thoughts, images, mood, behavior, situations) of a potential crisis:  negative thoughts, isolation, not taking medications, paranoia, feelings of evil.      Coping Skills (what can I do to take my mind off the problem, or to keep myself safe): take deep breaths, talk to loved ones, Buddhism/pray, journal, take a cool shower, watch something comforting, create something.    Outside Support (who can I reach out to for support and help): family, Caodaism peers.      National Suicide Prevention Hotline:  988      [facility] [phone] - Crisis   [facility] [phone] - LVF Crisis/Mobile [facility]   [phone] - SLPF Crisis   [facility]: [phone]  [facility]: [phone]   [facility] [phone] - Crisis   [facility] [phone] - Crisis     [facility] [phone] - Crisis   [facility] [phone] - [facility]   [phone] - Peer Support Talk Line (1-9pm daily)  [phone] - Teen Support Talk Line (1-9pm daily)  [phone] - [facility] [phone]- Crisis    [facility] [phone] - Crisis   [facility] [phone] - Crisis    [facility]) [phone] - [facility] [place] Crisis
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
